# Patient Record
Sex: MALE | Race: BLACK OR AFRICAN AMERICAN | Employment: OTHER | URBAN - METROPOLITAN AREA
[De-identification: names, ages, dates, MRNs, and addresses within clinical notes are randomized per-mention and may not be internally consistent; named-entity substitution may affect disease eponyms.]

---

## 2018-03-13 ENCOUNTER — OFFICE VISIT (OUTPATIENT)
Dept: CARDIOLOGY CLINIC | Age: 38
End: 2018-03-13
Payer: MEDICARE

## 2018-03-13 VITALS
HEART RATE: 84 BPM | HEIGHT: 69 IN | WEIGHT: 254.4 LBS | RESPIRATION RATE: 20 BRPM | DIASTOLIC BLOOD PRESSURE: 78 MMHG | SYSTOLIC BLOOD PRESSURE: 118 MMHG | BODY MASS INDEX: 37.68 KG/M2

## 2018-03-13 DIAGNOSIS — I42.0 CARDIOMYOPATHY, DILATED, NONISCHEMIC (HCC): Primary | ICD-10-CM

## 2018-03-13 DIAGNOSIS — Z86.79 H/O CHF: ICD-10-CM

## 2018-03-13 DIAGNOSIS — I42.0 DILATED CARDIOMYOPATHY (HCC): Primary | ICD-10-CM

## 2018-03-13 PROCEDURE — 93000 ELECTROCARDIOGRAM COMPLETE: CPT | Performed by: NURSE PRACTITIONER

## 2018-03-13 PROCEDURE — G8417 CALC BMI ABV UP PARAM F/U: HCPCS | Performed by: NURSE PRACTITIONER

## 2018-03-13 PROCEDURE — G8484 FLU IMMUNIZE NO ADMIN: HCPCS | Performed by: NURSE PRACTITIONER

## 2018-03-13 PROCEDURE — 4004F PT TOBACCO SCREEN RCVD TLK: CPT | Performed by: NURSE PRACTITIONER

## 2018-03-13 PROCEDURE — G8427 DOCREV CUR MEDS BY ELIG CLIN: HCPCS | Performed by: NURSE PRACTITIONER

## 2018-03-13 PROCEDURE — 99213 OFFICE O/P EST LOW 20 MIN: CPT | Performed by: NURSE PRACTITIONER

## 2018-03-13 RX ORDER — CLONAZEPAM 0.5 MG/1
TABLET ORAL
COMMUNITY
Start: 2018-01-11 | End: 2018-04-16

## 2018-03-13 RX ORDER — FUROSEMIDE 40 MG/1
TABLET ORAL
Qty: 15 TABLET | Refills: 3 | Status: SHIPPED | OUTPATIENT
Start: 2018-03-13 | End: 2018-08-10 | Stop reason: SDUPTHER
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
## 2018-03-13 NOTE — PATIENT INSTRUCTIONS
especially good for you. Examples include spinach, carrots, peaches, and berries. ? · Foods high in fiber can reduce your cholesterol and provide important vitamins and minerals. High-fiber foods include whole-grain cereals and breads, oatmeal, beans, brown rice, citrus fruits, and apples. ? · Limit drinks and foods with added sugar. These include candy, desserts, and soda pop. ? Lifestyle changes  ? · If your doctor recommends it, get more exercise. Walking is a good choice. Bit by bit, increase the amount you walk every day. Try for at least 30 minutes on most days of the week. You also may want to swim, bike, or do other activities. ? · Do not smoke. If you need help quitting, talk to your doctor about stop-smoking programs and medicines. These can increase your chances of quitting for good. Quitting smoking may be the most important step you can take to protect your heart. It is never too late to quit. You will get health benefits right away. ? · Limit alcohol to 2 drinks a day for men and 1 drink a day for women. Too much alcohol can cause health problems. Medicines  ? · Take your medicines exactly as prescribed. Call your doctor if you think you are having a problem with your medicine. ? · If your doctor recommends aspirin, take the amount directed each day. Make sure you take aspirin and not another kind of pain reliever, such as acetaminophen (Tylenol). If you take ibuprofen (such as Advil or Motrin) for other problems, take aspirin at least 2 hours before taking ibuprofen. When should you call for help? Call 911 if you have symptoms of a heart attack. These may include:  ? · Chest pain or pressure, or a strange feeling in the chest.   ? · Sweating. ? · Shortness of breath. ? · Pain, pressure, or a strange feeling in the back, neck, jaw, or upper belly or in one or both shoulders or arms. ? · Lightheadedness or sudden weakness. ? · A fast or irregular heartbeat. ? After you call 911,

## 2018-03-22 ENCOUNTER — TELEPHONE (OUTPATIENT)
Dept: CARDIOLOGY CLINIC | Age: 38
End: 2018-03-22

## 2018-04-11 ENCOUNTER — HOSPITAL ENCOUNTER (OUTPATIENT)
Age: 38
Discharge: HOME | End: 2018-04-11
Payer: MEDICARE

## 2018-04-11 LAB
AMMONIA: 34 UMOL/L (ref 16–60)
BASOPHILS ABSOLUTE: 0.02 E9/L (ref 0–0.2)
BASOPHILS RELATIVE PERCENT: 0.3 % (ref 0–2)
EOSINOPHILS ABSOLUTE: 0.06 E9/L (ref 0.05–0.5)
EOSINOPHILS RELATIVE PERCENT: 0.8 % (ref 0–6)
HCT VFR BLD CALC: 37.4 % (ref 37–54)
HEMOGLOBIN: 11.9 G/DL (ref 12.5–16.5)
IMMATURE GRANULOCYTES #: 0.08 E9/L
IMMATURE GRANULOCYTES %: 1.1 % (ref 0–5)
LYMPHOCYTES ABSOLUTE: 3.09 E9/L (ref 1.5–4)
LYMPHOCYTES RELATIVE PERCENT: 41.6 % (ref 20–42)
MCH RBC QN AUTO: 26.4 PG (ref 26–35)
MCHC RBC AUTO-ENTMCNC: 31.8 % (ref 32–34.5)
MCV RBC AUTO: 82.9 FL (ref 80–99.9)
MONOCYTES ABSOLUTE: 0.67 E9/L (ref 0.1–0.95)
MONOCYTES RELATIVE PERCENT: 9 % (ref 2–12)
NEUTROPHILS ABSOLUTE: 3.5 E9/L (ref 1.8–7.3)
NEUTROPHILS RELATIVE PERCENT: 47.2 % (ref 43–80)
PDW BLD-RTO: 15.2 FL (ref 11.5–15)
PLATELET # BLD: 226 E9/L (ref 130–450)
PMV BLD AUTO: 9.8 FL (ref 7–12)
RBC # BLD: 4.51 E12/L (ref 3.8–5.8)
VALPROIC ACID LEVEL: 43 MCG/ML (ref 50–100)
WBC # BLD: 7.4 E9/L (ref 4.5–11.5)

## 2018-04-11 PROCEDURE — 36415 COLL VENOUS BLD VENIPUNCTURE: CPT

## 2018-04-11 PROCEDURE — 85025 COMPLETE CBC W/AUTO DIFF WBC: CPT

## 2018-04-11 PROCEDURE — 82140 ASSAY OF AMMONIA: CPT

## 2018-04-11 PROCEDURE — 80164 ASSAY DIPROPYLACETIC ACD TOT: CPT

## 2018-04-30 ENCOUNTER — HOSPITAL ENCOUNTER (OUTPATIENT)
Age: 38
Discharge: HOME | End: 2018-04-30
Payer: MEDICARE

## 2018-04-30 LAB
AMMONIA: 13 UMOL/L (ref 16–60)
BASOPHILS ABSOLUTE: 0.02 E9/L (ref 0–0.2)
BASOPHILS RELATIVE PERCENT: 0.3 % (ref 0–2)
EOSINOPHILS ABSOLUTE: 0.06 E9/L (ref 0.05–0.5)
EOSINOPHILS RELATIVE PERCENT: 1 % (ref 0–6)
HCT VFR BLD CALC: 35.5 % (ref 37–54)
HEMOGLOBIN: 11.2 G/DL (ref 12.5–16.5)
IMMATURE GRANULOCYTES #: 0.1 E9/L
IMMATURE GRANULOCYTES %: 1.6 % (ref 0–5)
LYMPHOCYTES ABSOLUTE: 2.28 E9/L (ref 1.5–4)
LYMPHOCYTES RELATIVE PERCENT: 36.7 % (ref 20–42)
MCH RBC QN AUTO: 26.4 PG (ref 26–35)
MCHC RBC AUTO-ENTMCNC: 31.5 % (ref 32–34.5)
MCV RBC AUTO: 83.5 FL (ref 80–99.9)
MONOCYTES ABSOLUTE: 0.61 E9/L (ref 0.1–0.95)
MONOCYTES RELATIVE PERCENT: 9.8 % (ref 2–12)
NEUTROPHILS ABSOLUTE: 3.15 E9/L (ref 1.8–7.3)
NEUTROPHILS RELATIVE PERCENT: 50.6 % (ref 43–80)
PDW BLD-RTO: 15.1 FL (ref 11.5–15)
PLATELET # BLD: 215 E9/L (ref 130–450)
PMV BLD AUTO: 9.6 FL (ref 7–12)
RBC # BLD: 4.25 E12/L (ref 3.8–5.8)
VALPROIC ACID LEVEL: 35 MCG/ML (ref 50–100)
WBC # BLD: 6.2 E9/L (ref 4.5–11.5)

## 2018-04-30 PROCEDURE — 36415 COLL VENOUS BLD VENIPUNCTURE: CPT

## 2018-04-30 PROCEDURE — 82140 ASSAY OF AMMONIA: CPT

## 2018-04-30 PROCEDURE — 85025 COMPLETE CBC W/AUTO DIFF WBC: CPT

## 2018-04-30 PROCEDURE — 80164 ASSAY DIPROPYLACETIC ACD TOT: CPT

## 2018-05-30 ENCOUNTER — HOSPITAL ENCOUNTER (OUTPATIENT)
Age: 38
Discharge: HOME | End: 2018-05-30
Payer: MEDICARE

## 2018-05-30 LAB
AMMONIA: 33 UMOL/L (ref 16–60)
BASOPHILS ABSOLUTE: 0.02 E9/L (ref 0–0.2)
BASOPHILS RELATIVE PERCENT: 0.3 % (ref 0–2)
EOSINOPHILS ABSOLUTE: 0.05 E9/L (ref 0.05–0.5)
EOSINOPHILS RELATIVE PERCENT: 0.8 % (ref 0–6)
HCT VFR BLD CALC: 35.2 % (ref 37–54)
HEMOGLOBIN: 11 G/DL (ref 12.5–16.5)
IMMATURE GRANULOCYTES #: 0.07 E9/L
IMMATURE GRANULOCYTES %: 1.1 % (ref 0–5)
LYMPHOCYTES ABSOLUTE: 2.14 E9/L (ref 1.5–4)
LYMPHOCYTES RELATIVE PERCENT: 34 % (ref 20–42)
MCH RBC QN AUTO: 26.1 PG (ref 26–35)
MCHC RBC AUTO-ENTMCNC: 31.3 % (ref 32–34.5)
MCV RBC AUTO: 83.4 FL (ref 80–99.9)
MONOCYTES ABSOLUTE: 0.62 E9/L (ref 0.1–0.95)
MONOCYTES RELATIVE PERCENT: 9.9 % (ref 2–12)
NEUTROPHILS ABSOLUTE: 3.39 E9/L (ref 1.8–7.3)
NEUTROPHILS RELATIVE PERCENT: 53.9 % (ref 43–80)
PDW BLD-RTO: 14.3 FL (ref 11.5–15)
PLATELET # BLD: 199 E9/L (ref 130–450)
PMV BLD AUTO: 9 FL (ref 7–12)
RBC # BLD: 4.22 E12/L (ref 3.8–5.8)
VALPROIC ACID LEVEL: 44 MCG/ML (ref 50–100)
WBC # BLD: 6.3 E9/L (ref 4.5–11.5)

## 2018-05-30 PROCEDURE — 85025 COMPLETE CBC W/AUTO DIFF WBC: CPT

## 2018-05-30 PROCEDURE — 80164 ASSAY DIPROPYLACETIC ACD TOT: CPT

## 2018-05-30 PROCEDURE — 82140 ASSAY OF AMMONIA: CPT

## 2018-05-30 PROCEDURE — 36415 COLL VENOUS BLD VENIPUNCTURE: CPT

## 2018-06-21 ENCOUNTER — HOSPITAL ENCOUNTER (OUTPATIENT)
Age: 38
Discharge: HOME | End: 2018-06-21
Payer: MEDICARE

## 2018-06-21 LAB
AMMONIA: 26 UMOL/L (ref 16–60)
BASOPHILS ABSOLUTE: 0.02 E9/L (ref 0–0.2)
BASOPHILS RELATIVE PERCENT: 0.3 % (ref 0–2)
EOSINOPHILS ABSOLUTE: 0.06 E9/L (ref 0.05–0.5)
EOSINOPHILS RELATIVE PERCENT: 1 % (ref 0–6)
HCT VFR BLD CALC: 32.8 % (ref 37–54)
HEMOGLOBIN: 10.6 G/DL (ref 12.5–16.5)
IMMATURE GRANULOCYTES #: 0.06 E9/L
IMMATURE GRANULOCYTES %: 1 % (ref 0–5)
LYMPHOCYTES ABSOLUTE: 1.87 E9/L (ref 1.5–4)
LYMPHOCYTES RELATIVE PERCENT: 32.2 % (ref 20–42)
MCH RBC QN AUTO: 26.6 PG (ref 26–35)
MCHC RBC AUTO-ENTMCNC: 32.3 % (ref 32–34.5)
MCV RBC AUTO: 82.2 FL (ref 80–99.9)
MONOCYTES ABSOLUTE: 0.59 E9/L (ref 0.1–0.95)
MONOCYTES RELATIVE PERCENT: 10.2 % (ref 2–12)
NEUTROPHILS ABSOLUTE: 3.21 E9/L (ref 1.8–7.3)
NEUTROPHILS RELATIVE PERCENT: 55.3 % (ref 43–80)
PDW BLD-RTO: 14.2 FL (ref 11.5–15)
PLATELET # BLD: 186 E9/L (ref 130–450)
PMV BLD AUTO: 9 FL (ref 7–12)
RBC # BLD: 3.99 E12/L (ref 3.8–5.8)
VALPROIC ACID LEVEL: 24 MCG/ML (ref 50–100)
WBC # BLD: 5.8 E9/L (ref 4.5–11.5)

## 2018-06-21 PROCEDURE — 82140 ASSAY OF AMMONIA: CPT

## 2018-06-21 PROCEDURE — 36415 COLL VENOUS BLD VENIPUNCTURE: CPT

## 2018-06-21 PROCEDURE — 80164 ASSAY DIPROPYLACETIC ACD TOT: CPT

## 2018-06-21 PROCEDURE — 85025 COMPLETE CBC W/AUTO DIFF WBC: CPT

## 2018-07-02 ENCOUNTER — HOSPITAL ENCOUNTER (OUTPATIENT)
Age: 38
Discharge: HOME | End: 2018-07-02
Payer: MEDICARE

## 2018-07-02 LAB
AMMONIA: 31 UMOL/L (ref 16–60)
BASOPHILS ABSOLUTE: 0.02 E9/L (ref 0–0.2)
BASOPHILS RELATIVE PERCENT: 0.3 % (ref 0–2)
EOSINOPHILS ABSOLUTE: 0.05 E9/L (ref 0.05–0.5)
EOSINOPHILS RELATIVE PERCENT: 0.8 % (ref 0–6)
HCT VFR BLD CALC: 34.1 % (ref 37–54)
HEMOGLOBIN: 11.1 G/DL (ref 12.5–16.5)
IMMATURE GRANULOCYTES #: 0.06 E9/L
IMMATURE GRANULOCYTES %: 1 % (ref 0–5)
LYMPHOCYTES ABSOLUTE: 2.21 E9/L (ref 1.5–4)
LYMPHOCYTES RELATIVE PERCENT: 36 % (ref 20–42)
MCH RBC QN AUTO: 26.6 PG (ref 26–35)
MCHC RBC AUTO-ENTMCNC: 32.6 % (ref 32–34.5)
MCV RBC AUTO: 81.8 FL (ref 80–99.9)
MONOCYTES ABSOLUTE: 0.68 E9/L (ref 0.1–0.95)
MONOCYTES RELATIVE PERCENT: 11.1 % (ref 2–12)
NEUTROPHILS ABSOLUTE: 3.12 E9/L (ref 1.8–7.3)
NEUTROPHILS RELATIVE PERCENT: 50.8 % (ref 43–80)
PDW BLD-RTO: 14.6 FL (ref 11.5–15)
PLATELET # BLD: 224 E9/L (ref 130–450)
PMV BLD AUTO: 9.2 FL (ref 7–12)
RBC # BLD: 4.17 E12/L (ref 3.8–5.8)
VALPROIC ACID LEVEL: 50 MCG/ML (ref 50–100)
WBC # BLD: 6.1 E9/L (ref 4.5–11.5)

## 2018-07-02 PROCEDURE — 80164 ASSAY DIPROPYLACETIC ACD TOT: CPT

## 2018-07-02 PROCEDURE — 36415 COLL VENOUS BLD VENIPUNCTURE: CPT

## 2018-07-02 PROCEDURE — 82140 ASSAY OF AMMONIA: CPT

## 2018-07-02 PROCEDURE — 85025 COMPLETE CBC W/AUTO DIFF WBC: CPT

## 2018-07-13 ENCOUNTER — HOSPITAL ENCOUNTER (OUTPATIENT)
Age: 38
Discharge: HOME | End: 2018-07-13
Payer: MEDICARE

## 2018-07-13 LAB
BASOPHILS ABSOLUTE: 0.02 E9/L (ref 0–0.2)
BASOPHILS RELATIVE PERCENT: 0.3 % (ref 0–2)
EOSINOPHILS ABSOLUTE: 0.07 E9/L (ref 0.05–0.5)
EOSINOPHILS RELATIVE PERCENT: 1 % (ref 0–6)
HCT VFR BLD CALC: 34.4 % (ref 37–54)
HEMOGLOBIN: 11 G/DL (ref 12.5–16.5)
IMMATURE GRANULOCYTES #: 0.05 E9/L
IMMATURE GRANULOCYTES %: 0.7 % (ref 0–5)
LYMPHOCYTES ABSOLUTE: 2.95 E9/L (ref 1.5–4)
LYMPHOCYTES RELATIVE PERCENT: 41.4 % (ref 20–42)
MCH RBC QN AUTO: 26.5 PG (ref 26–35)
MCHC RBC AUTO-ENTMCNC: 32 % (ref 32–34.5)
MCV RBC AUTO: 82.9 FL (ref 80–99.9)
MONOCYTES ABSOLUTE: 0.72 E9/L (ref 0.1–0.95)
MONOCYTES RELATIVE PERCENT: 10.1 % (ref 2–12)
NEUTROPHILS ABSOLUTE: 3.32 E9/L (ref 1.8–7.3)
NEUTROPHILS RELATIVE PERCENT: 46.5 % (ref 43–80)
PDW BLD-RTO: 14.5 FL (ref 11.5–15)
PLATELET # BLD: 253 E9/L (ref 130–450)
PMV BLD AUTO: 9.4 FL (ref 7–12)
RBC # BLD: 4.15 E12/L (ref 3.8–5.8)
WBC # BLD: 7.1 E9/L (ref 4.5–11.5)

## 2018-07-13 PROCEDURE — 36415 COLL VENOUS BLD VENIPUNCTURE: CPT

## 2018-07-13 PROCEDURE — 85025 COMPLETE CBC W/AUTO DIFF WBC: CPT

## 2018-07-20 ENCOUNTER — HOSPITAL ENCOUNTER (OUTPATIENT)
Age: 38
Discharge: HOME | End: 2018-07-20
Payer: MEDICARE

## 2018-07-20 LAB
AMMONIA: 45 UMOL/L (ref 16–60)
BASOPHILS ABSOLUTE: 0.02 E9/L (ref 0–0.2)
BASOPHILS RELATIVE PERCENT: 0.3 % (ref 0–2)
EOSINOPHILS ABSOLUTE: 0.06 E9/L (ref 0.05–0.5)
EOSINOPHILS RELATIVE PERCENT: 1 % (ref 0–6)
HCT VFR BLD CALC: 35.8 % (ref 37–54)
HEMOGLOBIN: 11.2 G/DL (ref 12.5–16.5)
IMMATURE GRANULOCYTES #: 0.04 E9/L
IMMATURE GRANULOCYTES %: 0.7 % (ref 0–5)
LYMPHOCYTES ABSOLUTE: 2.26 E9/L (ref 1.5–4)
LYMPHOCYTES RELATIVE PERCENT: 38 % (ref 20–42)
MCH RBC QN AUTO: 25.8 PG (ref 26–35)
MCHC RBC AUTO-ENTMCNC: 31.3 % (ref 32–34.5)
MCV RBC AUTO: 82.5 FL (ref 80–99.9)
MONOCYTES ABSOLUTE: 0.47 E9/L (ref 0.1–0.95)
MONOCYTES RELATIVE PERCENT: 7.9 % (ref 2–12)
NEUTROPHILS ABSOLUTE: 3.09 E9/L (ref 1.8–7.3)
NEUTROPHILS RELATIVE PERCENT: 52.1 % (ref 43–80)
PDW BLD-RTO: 14.1 FL (ref 11.5–15)
PLATELET # BLD: 222 E9/L (ref 130–450)
PMV BLD AUTO: 10 FL (ref 7–12)
RBC # BLD: 4.34 E12/L (ref 3.8–5.8)
VALPROIC ACID LEVEL: 52 MCG/ML (ref 50–100)
WBC # BLD: 5.9 E9/L (ref 4.5–11.5)

## 2018-07-20 PROCEDURE — 80164 ASSAY DIPROPYLACETIC ACD TOT: CPT

## 2018-07-20 PROCEDURE — 85025 COMPLETE CBC W/AUTO DIFF WBC: CPT

## 2018-07-20 PROCEDURE — 36415 COLL VENOUS BLD VENIPUNCTURE: CPT

## 2018-07-20 PROCEDURE — 82140 ASSAY OF AMMONIA: CPT

## 2018-07-26 ENCOUNTER — HOSPITAL ENCOUNTER (OUTPATIENT)
Age: 38
Discharge: HOME | End: 2018-07-26
Payer: MEDICARE

## 2018-07-26 LAB
AMMONIA: 39 UMOL/L (ref 16–60)
BASOPHILS ABSOLUTE: 0.02 E9/L (ref 0–0.2)
BASOPHILS RELATIVE PERCENT: 0.3 % (ref 0–2)
EOSINOPHILS ABSOLUTE: 0.08 E9/L (ref 0.05–0.5)
EOSINOPHILS RELATIVE PERCENT: 1.2 % (ref 0–6)
HCT VFR BLD CALC: 35.9 % (ref 37–54)
HEMOGLOBIN: 11.3 G/DL (ref 12.5–16.5)
IMMATURE GRANULOCYTES #: 0.04 E9/L
IMMATURE GRANULOCYTES %: 0.6 % (ref 0–5)
LYMPHOCYTES ABSOLUTE: 2.46 E9/L (ref 1.5–4)
LYMPHOCYTES RELATIVE PERCENT: 37.8 % (ref 20–42)
MCH RBC QN AUTO: 25.8 PG (ref 26–35)
MCHC RBC AUTO-ENTMCNC: 31.5 % (ref 32–34.5)
MCV RBC AUTO: 82 FL (ref 80–99.9)
MONOCYTES ABSOLUTE: 0.64 E9/L (ref 0.1–0.95)
MONOCYTES RELATIVE PERCENT: 9.8 % (ref 2–12)
NEUTROPHILS ABSOLUTE: 3.27 E9/L (ref 1.8–7.3)
NEUTROPHILS RELATIVE PERCENT: 50.3 % (ref 43–80)
PDW BLD-RTO: 14.1 FL (ref 11.5–15)
PLATELET # BLD: 216 E9/L (ref 130–450)
PMV BLD AUTO: 9.6 FL (ref 7–12)
RBC # BLD: 4.38 E12/L (ref 3.8–5.8)
VALPROIC ACID LEVEL: 17 MCG/ML (ref 50–100)
WBC # BLD: 6.5 E9/L (ref 4.5–11.5)

## 2018-07-26 PROCEDURE — 80164 ASSAY DIPROPYLACETIC ACD TOT: CPT

## 2018-07-26 PROCEDURE — 36415 COLL VENOUS BLD VENIPUNCTURE: CPT

## 2018-07-26 PROCEDURE — 82140 ASSAY OF AMMONIA: CPT

## 2018-07-26 PROCEDURE — 85025 COMPLETE CBC W/AUTO DIFF WBC: CPT

## 2018-08-03 ENCOUNTER — HOSPITAL ENCOUNTER (OUTPATIENT)
Age: 38
Discharge: HOME | End: 2018-08-03
Payer: MEDICARE

## 2018-08-03 LAB
AMMONIA: 29 UMOL/L (ref 16–60)
BASOPHILS ABSOLUTE: 0.02 E9/L (ref 0–0.2)
BASOPHILS RELATIVE PERCENT: 0.3 % (ref 0–2)
EOSINOPHILS ABSOLUTE: 0.07 E9/L (ref 0.05–0.5)
EOSINOPHILS RELATIVE PERCENT: 1.1 % (ref 0–6)
HCT VFR BLD CALC: 37.7 % (ref 37–54)
HEMOGLOBIN: 11.9 G/DL (ref 12.5–16.5)
IMMATURE GRANULOCYTES #: 0.06 E9/L
IMMATURE GRANULOCYTES %: 0.9 % (ref 0–5)
LYMPHOCYTES ABSOLUTE: 2.94 E9/L (ref 1.5–4)
LYMPHOCYTES RELATIVE PERCENT: 46.2 % (ref 20–42)
MCH RBC QN AUTO: 25.7 PG (ref 26–35)
MCHC RBC AUTO-ENTMCNC: 31.6 % (ref 32–34.5)
MCV RBC AUTO: 81.4 FL (ref 80–99.9)
MONOCYTES ABSOLUTE: 0.52 E9/L (ref 0.1–0.95)
MONOCYTES RELATIVE PERCENT: 8.2 % (ref 2–12)
NEUTROPHILS ABSOLUTE: 2.75 E9/L (ref 1.8–7.3)
NEUTROPHILS RELATIVE PERCENT: 43.3 % (ref 43–80)
PDW BLD-RTO: 14.3 FL (ref 11.5–15)
PLATELET # BLD: 250 E9/L (ref 130–450)
PMV BLD AUTO: 9.3 FL (ref 7–12)
RBC # BLD: 4.63 E12/L (ref 3.8–5.8)
VALPROIC ACID LEVEL: 45 MCG/ML (ref 50–100)
WBC # BLD: 6.4 E9/L (ref 4.5–11.5)

## 2018-08-03 PROCEDURE — 82140 ASSAY OF AMMONIA: CPT

## 2018-08-03 PROCEDURE — 85025 COMPLETE CBC W/AUTO DIFF WBC: CPT

## 2018-08-03 PROCEDURE — 36415 COLL VENOUS BLD VENIPUNCTURE: CPT

## 2018-08-03 PROCEDURE — 80164 ASSAY DIPROPYLACETIC ACD TOT: CPT

## 2018-08-10 ENCOUNTER — HOSPITAL ENCOUNTER (OUTPATIENT)
Age: 38
Discharge: HOME | End: 2018-08-10
Payer: MEDICARE

## 2018-08-10 LAB
AMMONIA: 42 UMOL/L (ref 16–60)
BASOPHILS ABSOLUTE: 0.02 E9/L (ref 0–0.2)
BASOPHILS RELATIVE PERCENT: 0.3 % (ref 0–2)
EOSINOPHILS ABSOLUTE: 0.05 E9/L (ref 0.05–0.5)
EOSINOPHILS RELATIVE PERCENT: 0.8 % (ref 0–6)
HCT VFR BLD CALC: 35.1 % (ref 37–54)
HEMOGLOBIN: 11.3 G/DL (ref 12.5–16.5)
IMMATURE GRANULOCYTES #: 0.08 E9/L
IMMATURE GRANULOCYTES %: 1.3 % (ref 0–5)
LYMPHOCYTES ABSOLUTE: 2.52 E9/L (ref 1.5–4)
LYMPHOCYTES RELATIVE PERCENT: 39.4 % (ref 20–42)
MCH RBC QN AUTO: 26.5 PG (ref 26–35)
MCHC RBC AUTO-ENTMCNC: 32.2 % (ref 32–34.5)
MCV RBC AUTO: 82.4 FL (ref 80–99.9)
MONOCYTES ABSOLUTE: 0.54 E9/L (ref 0.1–0.95)
MONOCYTES RELATIVE PERCENT: 8.4 % (ref 2–12)
NEUTROPHILS ABSOLUTE: 3.19 E9/L (ref 1.8–7.3)
NEUTROPHILS RELATIVE PERCENT: 49.8 % (ref 43–80)
PDW BLD-RTO: 14.5 FL (ref 11.5–15)
PLATELET # BLD: 221 E9/L (ref 130–450)
PMV BLD AUTO: 9.2 FL (ref 7–12)
RBC # BLD: 4.26 E12/L (ref 3.8–5.8)
VALPROIC ACID LEVEL: 35 MCG/ML (ref 50–100)
WBC # BLD: 6.4 E9/L (ref 4.5–11.5)

## 2018-08-10 PROCEDURE — 82140 ASSAY OF AMMONIA: CPT

## 2018-08-10 PROCEDURE — 36415 COLL VENOUS BLD VENIPUNCTURE: CPT

## 2018-08-10 PROCEDURE — 80164 ASSAY DIPROPYLACETIC ACD TOT: CPT

## 2018-08-10 PROCEDURE — 85025 COMPLETE CBC W/AUTO DIFF WBC: CPT

## 2018-08-17 ENCOUNTER — HOSPITAL ENCOUNTER (OUTPATIENT)
Age: 38
Discharge: HOME | End: 2018-08-17
Payer: MEDICARE

## 2018-08-17 LAB
BASOPHILS ABSOLUTE: 0.03 E9/L (ref 0–0.2)
BASOPHILS RELATIVE PERCENT: 0.4 % (ref 0–2)
EOSINOPHILS ABSOLUTE: 0.07 E9/L (ref 0.05–0.5)
EOSINOPHILS RELATIVE PERCENT: 0.9 % (ref 0–6)
HCT VFR BLD CALC: 36 % (ref 37–54)
HEMOGLOBIN: 11.7 G/DL (ref 12.5–16.5)
IMMATURE GRANULOCYTES #: 0.04 E9/L
IMMATURE GRANULOCYTES %: 0.5 % (ref 0–5)
LYMPHOCYTES ABSOLUTE: 3.05 E9/L (ref 1.5–4)
LYMPHOCYTES RELATIVE PERCENT: 37.4 % (ref 20–42)
MCH RBC QN AUTO: 26.5 PG (ref 26–35)
MCHC RBC AUTO-ENTMCNC: 32.5 % (ref 32–34.5)
MCV RBC AUTO: 81.6 FL (ref 80–99.9)
MONOCYTES ABSOLUTE: 0.86 E9/L (ref 0.1–0.95)
MONOCYTES RELATIVE PERCENT: 10.5 % (ref 2–12)
NEUTROPHILS ABSOLUTE: 4.11 E9/L (ref 1.8–7.3)
NEUTROPHILS RELATIVE PERCENT: 50.3 % (ref 43–80)
PDW BLD-RTO: 14.4 FL (ref 11.5–15)
PLATELET # BLD: 238 E9/L (ref 130–450)
PMV BLD AUTO: 9.3 FL (ref 7–12)
RBC # BLD: 4.41 E12/L (ref 3.8–5.8)
WBC # BLD: 8.2 E9/L (ref 4.5–11.5)

## 2018-08-17 PROCEDURE — 36415 COLL VENOUS BLD VENIPUNCTURE: CPT

## 2018-08-17 PROCEDURE — 85025 COMPLETE CBC W/AUTO DIFF WBC: CPT

## 2018-08-27 ENCOUNTER — HOSPITAL ENCOUNTER (OUTPATIENT)
Age: 38
Discharge: HOME | End: 2018-08-27
Payer: MEDICARE

## 2018-08-27 LAB
BASOPHILS ABSOLUTE: 0.03 E9/L (ref 0–0.2)
BASOPHILS RELATIVE PERCENT: 0.4 % (ref 0–2)
EOSINOPHILS ABSOLUTE: 0.06 E9/L (ref 0.05–0.5)
EOSINOPHILS RELATIVE PERCENT: 0.9 % (ref 0–6)
HCT VFR BLD CALC: 36.6 % (ref 37–54)
HEMOGLOBIN: 11.6 G/DL (ref 12.5–16.5)
IMMATURE GRANULOCYTES #: 0.07 E9/L
IMMATURE GRANULOCYTES %: 1 % (ref 0–5)
LYMPHOCYTES ABSOLUTE: 2.69 E9/L (ref 1.5–4)
LYMPHOCYTES RELATIVE PERCENT: 38.6 % (ref 20–42)
MCH RBC QN AUTO: 26.3 PG (ref 26–35)
MCHC RBC AUTO-ENTMCNC: 31.7 % (ref 32–34.5)
MCV RBC AUTO: 83 FL (ref 80–99.9)
MONOCYTES ABSOLUTE: 0.65 E9/L (ref 0.1–0.95)
MONOCYTES RELATIVE PERCENT: 9.3 % (ref 2–12)
NEUTROPHILS ABSOLUTE: 3.46 E9/L (ref 1.8–7.3)
NEUTROPHILS RELATIVE PERCENT: 49.8 % (ref 43–80)
PDW BLD-RTO: 14.2 FL (ref 11.5–15)
PLATELET # BLD: 233 E9/L (ref 130–450)
PMV BLD AUTO: 9.2 FL (ref 7–12)
RBC # BLD: 4.41 E12/L (ref 3.8–5.8)
WBC # BLD: 7 E9/L (ref 4.5–11.5)

## 2018-08-27 PROCEDURE — 85025 COMPLETE CBC W/AUTO DIFF WBC: CPT

## 2018-08-27 PROCEDURE — 36415 COLL VENOUS BLD VENIPUNCTURE: CPT

## 2018-09-04 ENCOUNTER — HOSPITAL ENCOUNTER (OUTPATIENT)
Age: 38
Discharge: HOME | End: 2018-09-04
Payer: MEDICARE

## 2018-09-04 LAB
BASOPHILS ABSOLUTE: 0.02 E9/L (ref 0–0.2)
BASOPHILS RELATIVE PERCENT: 0.3 % (ref 0–2)
EOSINOPHILS ABSOLUTE: 0.06 E9/L (ref 0.05–0.5)
EOSINOPHILS RELATIVE PERCENT: 0.8 % (ref 0–6)
HCT VFR BLD CALC: 35.2 % (ref 37–54)
HEMOGLOBIN: 11.2 G/DL (ref 12.5–16.5)
IMMATURE GRANULOCYTES #: 0.05 E9/L
IMMATURE GRANULOCYTES %: 0.6 % (ref 0–5)
LYMPHOCYTES ABSOLUTE: 3.01 E9/L (ref 1.5–4)
LYMPHOCYTES RELATIVE PERCENT: 39.1 % (ref 20–42)
MCH RBC QN AUTO: 25.9 PG (ref 26–35)
MCHC RBC AUTO-ENTMCNC: 31.8 % (ref 32–34.5)
MCV RBC AUTO: 81.5 FL (ref 80–99.9)
MONOCYTES ABSOLUTE: 0.75 E9/L (ref 0.1–0.95)
MONOCYTES RELATIVE PERCENT: 9.7 % (ref 2–12)
NEUTROPHILS ABSOLUTE: 3.81 E9/L (ref 1.8–7.3)
NEUTROPHILS RELATIVE PERCENT: 49.5 % (ref 43–80)
PDW BLD-RTO: 14.2 FL (ref 11.5–15)
PLATELET # BLD: 203 E9/L (ref 130–450)
PMV BLD AUTO: 9.4 FL (ref 7–12)
RBC # BLD: 4.32 E12/L (ref 3.8–5.8)
WBC # BLD: 7.7 E9/L (ref 4.5–11.5)

## 2018-09-04 PROCEDURE — 36415 COLL VENOUS BLD VENIPUNCTURE: CPT

## 2018-09-04 PROCEDURE — 85025 COMPLETE CBC W/AUTO DIFF WBC: CPT

## 2018-09-10 ENCOUNTER — HOSPITAL ENCOUNTER (OUTPATIENT)
Age: 38
Discharge: HOME | End: 2018-09-10
Payer: MEDICARE

## 2018-09-10 LAB
AMMONIA: 14 UMOL/L (ref 16–60)
BASOPHILS ABSOLUTE: 0.03 E9/L (ref 0–0.2)
BASOPHILS RELATIVE PERCENT: 0.4 % (ref 0–2)
EOSINOPHILS ABSOLUTE: 0.06 E9/L (ref 0.05–0.5)
EOSINOPHILS RELATIVE PERCENT: 0.8 % (ref 0–6)
HCT VFR BLD CALC: 36.2 % (ref 37–54)
HEMOGLOBIN: 11.6 G/DL (ref 12.5–16.5)
IMMATURE GRANULOCYTES #: 0.04 E9/L
IMMATURE GRANULOCYTES %: 0.5 % (ref 0–5)
LYMPHOCYTES ABSOLUTE: 2.92 E9/L (ref 1.5–4)
LYMPHOCYTES RELATIVE PERCENT: 39.6 % (ref 20–42)
MCH RBC QN AUTO: 26.1 PG (ref 26–35)
MCHC RBC AUTO-ENTMCNC: 32 % (ref 32–34.5)
MCV RBC AUTO: 81.3 FL (ref 80–99.9)
MONOCYTES ABSOLUTE: 0.69 E9/L (ref 0.1–0.95)
MONOCYTES RELATIVE PERCENT: 9.3 % (ref 2–12)
NEUTROPHILS ABSOLUTE: 3.64 E9/L (ref 1.8–7.3)
NEUTROPHILS RELATIVE PERCENT: 49.4 % (ref 43–80)
PDW BLD-RTO: 14.3 FL (ref 11.5–15)
PLATELET # BLD: 221 E9/L (ref 130–450)
PMV BLD AUTO: 9.4 FL (ref 7–12)
RBC # BLD: 4.45 E12/L (ref 3.8–5.8)
VALPROIC ACID LEVEL: 53 MCG/ML (ref 50–100)
WBC # BLD: 7.4 E9/L (ref 4.5–11.5)

## 2018-09-10 PROCEDURE — 82140 ASSAY OF AMMONIA: CPT

## 2018-09-10 PROCEDURE — 36415 COLL VENOUS BLD VENIPUNCTURE: CPT

## 2018-09-10 PROCEDURE — 85025 COMPLETE CBC W/AUTO DIFF WBC: CPT

## 2018-09-10 PROCEDURE — 80164 ASSAY DIPROPYLACETIC ACD TOT: CPT

## 2018-09-20 ENCOUNTER — HOSPITAL ENCOUNTER (OUTPATIENT)
Age: 38
Discharge: HOME | End: 2018-09-20
Payer: MEDICARE

## 2018-09-20 LAB
BASOPHILS ABSOLUTE: 0.03 E9/L (ref 0–0.2)
BASOPHILS RELATIVE PERCENT: 0.5 % (ref 0–2)
EOSINOPHILS ABSOLUTE: 0.04 E9/L (ref 0.05–0.5)
EOSINOPHILS RELATIVE PERCENT: 0.6 % (ref 0–6)
HCT VFR BLD CALC: 36.5 % (ref 37–54)
HEMOGLOBIN: 11.6 G/DL (ref 12.5–16.5)
IMMATURE GRANULOCYTES #: 0.03 E9/L
IMMATURE GRANULOCYTES %: 0.5 % (ref 0–5)
LYMPHOCYTES ABSOLUTE: 2.22 E9/L (ref 1.5–4)
LYMPHOCYTES RELATIVE PERCENT: 35.1 % (ref 20–42)
MCH RBC QN AUTO: 25.9 PG (ref 26–35)
MCHC RBC AUTO-ENTMCNC: 31.8 % (ref 32–34.5)
MCV RBC AUTO: 81.5 FL (ref 80–99.9)
MONOCYTES ABSOLUTE: 0.56 E9/L (ref 0.1–0.95)
MONOCYTES RELATIVE PERCENT: 8.9 % (ref 2–12)
NEUTROPHILS ABSOLUTE: 3.44 E9/L (ref 1.8–7.3)
NEUTROPHILS RELATIVE PERCENT: 54.4 % (ref 43–80)
PDW BLD-RTO: 14.4 FL (ref 11.5–15)
PLATELET # BLD: 224 E9/L (ref 130–450)
PMV BLD AUTO: 9.4 FL (ref 7–12)
RBC # BLD: 4.48 E12/L (ref 3.8–5.8)
WBC # BLD: 6.3 E9/L (ref 4.5–11.5)

## 2018-09-20 PROCEDURE — 36415 COLL VENOUS BLD VENIPUNCTURE: CPT

## 2018-09-20 PROCEDURE — 85025 COMPLETE CBC W/AUTO DIFF WBC: CPT

## 2018-09-24 ENCOUNTER — HOSPITAL ENCOUNTER (INPATIENT)
Age: 38
LOS: 6 days | Discharge: HOME | DRG: 885 | End: 2018-10-01
Attending: EMERGENCY MEDICINE | Admitting: PSYCHIATRY & NEUROLOGY
Payer: MEDICARE

## 2018-09-24 DIAGNOSIS — Z00.8 PATIENT NEEDS PSYCHIATRIC HOLD FOR EVALUATION: Primary | ICD-10-CM

## 2018-09-24 LAB
ACETAMINOPHEN LEVEL: <5 MCG/ML (ref 10–30)
ALBUMIN SERPL-MCNC: 4.2 G/DL (ref 3.5–5.2)
ALP BLD-CCNC: 75 U/L (ref 40–129)
ALT SERPL-CCNC: 10 U/L (ref 0–40)
AMPHETAMINE SCREEN, URINE: NOT DETECTED
ANION GAP SERPL CALCULATED.3IONS-SCNC: 14 MMOL/L (ref 7–16)
AST SERPL-CCNC: 12 U/L (ref 0–39)
BARBITURATE SCREEN URINE: NOT DETECTED
BENZODIAZEPINE SCREEN, URINE: NOT DETECTED
BILIRUB SERPL-MCNC: <0.2 MG/DL (ref 0–1.2)
BUN BLDV-MCNC: 11 MG/DL (ref 6–20)
CALCIUM SERPL-MCNC: 9.4 MG/DL (ref 8.6–10.2)
CANNABINOID SCREEN URINE: NOT DETECTED
CHLORIDE BLD-SCNC: 97 MMOL/L (ref 98–107)
CO2: 25 MMOL/L (ref 22–29)
COCAINE METABOLITE SCREEN URINE: NOT DETECTED
CREAT SERPL-MCNC: 1.2 MG/DL (ref 0.7–1.2)
ETHANOL: <10 MG/DL (ref 0–0.08)
GFR AFRICAN AMERICAN: >60
GFR NON-AFRICAN AMERICAN: >60 ML/MIN/1.73
GLUCOSE BLD-MCNC: 111 MG/DL (ref 74–109)
HCT VFR BLD CALC: 36.8 % (ref 37–54)
HEMOGLOBIN: 11.8 G/DL (ref 12.5–16.5)
MCH RBC QN AUTO: 25.9 PG (ref 26–35)
MCHC RBC AUTO-ENTMCNC: 32.1 % (ref 32–34.5)
MCV RBC AUTO: 80.9 FL (ref 80–99.9)
METHADONE SCREEN, URINE: NOT DETECTED
OPIATE SCREEN URINE: NOT DETECTED
PDW BLD-RTO: 14.6 FL (ref 11.5–15)
PHENCYCLIDINE SCREEN URINE: NOT DETECTED
PLATELET # BLD: 226 E9/L (ref 130–450)
PMV BLD AUTO: 9.5 FL (ref 7–12)
POTASSIUM SERPL-SCNC: 3.9 MMOL/L (ref 3.5–5)
PROPOXYPHENE SCREEN: NOT DETECTED
RBC # BLD: 4.55 E12/L (ref 3.8–5.8)
SALICYLATE, SERUM: <0.3 MG/DL (ref 0–30)
SODIUM BLD-SCNC: 136 MMOL/L (ref 132–146)
TOTAL PROTEIN: 7.2 G/DL (ref 6.4–8.3)
TRICYCLIC ANTIDEPRESSANTS SCREEN SERUM: NEGATIVE NG/ML
VALPROIC ACID LEVEL: 35 MCG/ML (ref 50–100)
WBC # BLD: 7.1 E9/L (ref 4.5–11.5)

## 2018-09-24 PROCEDURE — G0480 DRUG TEST DEF 1-7 CLASSES: HCPCS

## 2018-09-24 PROCEDURE — 85027 COMPLETE CBC AUTOMATED: CPT

## 2018-09-24 PROCEDURE — 99285 EMERGENCY DEPT VISIT HI MDM: CPT

## 2018-09-24 PROCEDURE — 80164 ASSAY DIPROPYLACETIC ACD TOT: CPT

## 2018-09-24 PROCEDURE — 80053 COMPREHEN METABOLIC PANEL: CPT

## 2018-09-24 PROCEDURE — 80307 DRUG TEST PRSMV CHEM ANLYZR: CPT

## 2018-09-25 PROCEDURE — 1240000000 HC EMOTIONAL WELLNESS R&B

## 2018-09-25 PROCEDURE — 6370000000 HC RX 637 (ALT 250 FOR IP): Performed by: EMERGENCY MEDICINE

## 2018-09-25 PROCEDURE — 6370000000 HC RX 637 (ALT 250 FOR IP): Performed by: NURSE PRACTITIONER

## 2018-09-25 RX ORDER — ENALAPRIL MALEATE 5 MG/1
10 TABLET ORAL DAILY
Status: DISCONTINUED | OUTPATIENT
Start: 2018-09-25 | End: 2018-10-01 | Stop reason: HOSPADM

## 2018-09-25 RX ORDER — HALOPERIDOL 5 MG/ML
10 INJECTION INTRAMUSCULAR EVERY 6 HOURS PRN
Status: DISCONTINUED | OUTPATIENT
Start: 2018-09-25 | End: 2018-10-01 | Stop reason: HOSPADM

## 2018-09-25 RX ORDER — SPIRONOLACTONE 25 MG/1
50 TABLET ORAL DAILY
Status: DISCONTINUED | OUTPATIENT
Start: 2018-09-25 | End: 2018-10-01 | Stop reason: HOSPADM

## 2018-09-25 RX ORDER — HYDROXYZINE PAMOATE 50 MG/1
50 CAPSULE ORAL EVERY 6 HOURS PRN
Status: DISCONTINUED | OUTPATIENT
Start: 2018-09-25 | End: 2018-10-01 | Stop reason: HOSPADM

## 2018-09-25 RX ORDER — ACETAMINOPHEN 325 MG/1
650 TABLET ORAL EVERY 4 HOURS PRN
Status: DISCONTINUED | OUTPATIENT
Start: 2018-09-25 | End: 2018-10-01 | Stop reason: HOSPADM

## 2018-09-25 RX ORDER — BENZTROPINE MESYLATE 1 MG/ML
2 INJECTION INTRAMUSCULAR; INTRAVENOUS 2 TIMES DAILY PRN
Status: DISCONTINUED | OUTPATIENT
Start: 2018-09-25 | End: 2018-10-01 | Stop reason: HOSPADM

## 2018-09-25 RX ORDER — CARVEDILOL 25 MG/1
25 TABLET ORAL 2 TIMES DAILY WITH MEALS
Status: DISCONTINUED | OUTPATIENT
Start: 2018-09-25 | End: 2018-10-01 | Stop reason: HOSPADM

## 2018-09-25 RX ORDER — DIVALPROEX SODIUM 500 MG/1
500 TABLET, DELAYED RELEASE ORAL NIGHTLY
Status: DISCONTINUED | OUTPATIENT
Start: 2018-09-25 | End: 2018-10-01 | Stop reason: HOSPADM

## 2018-09-25 RX ORDER — MAGNESIUM HYDROXIDE/ALUMINUM HYDROXICE/SIMETHICONE 120; 1200; 1200 MG/30ML; MG/30ML; MG/30ML
30 SUSPENSION ORAL PRN
Status: DISCONTINUED | OUTPATIENT
Start: 2018-09-25 | End: 2018-10-01 | Stop reason: HOSPADM

## 2018-09-25 RX ORDER — CLOZAPINE 100 MG/1
300 TABLET ORAL 2 TIMES DAILY
Status: DISCONTINUED | OUTPATIENT
Start: 2018-09-25 | End: 2018-09-26

## 2018-09-25 RX ORDER — NICOTINE 21 MG/24HR
1 PATCH, TRANSDERMAL 24 HOURS TRANSDERMAL DAILY
Status: DISCONTINUED | OUTPATIENT
Start: 2018-09-26 | End: 2018-10-01 | Stop reason: HOSPADM

## 2018-09-25 RX ORDER — PALIPERIDONE 9 MG/1
9 TABLET, EXTENDED RELEASE ORAL EVERY MORNING
Status: DISCONTINUED | OUTPATIENT
Start: 2018-09-25 | End: 2018-09-26

## 2018-09-25 RX ORDER — TRAZODONE HYDROCHLORIDE 50 MG/1
50 TABLET ORAL NIGHTLY PRN
Status: DISCONTINUED | OUTPATIENT
Start: 2018-09-25 | End: 2018-10-01 | Stop reason: HOSPADM

## 2018-09-25 RX ORDER — OLANZAPINE 10 MG/1
10 TABLET ORAL
Status: ACTIVE | OUTPATIENT
Start: 2018-09-25 | End: 2018-09-25

## 2018-09-25 RX ADMIN — DIVALPROEX SODIUM 500 MG: 500 TABLET, DELAYED RELEASE ORAL at 21:37

## 2018-09-25 RX ADMIN — ALUMINUM HYDROXIDE, MAGNESIUM HYDROXIDE, AND SIMETHICONE 30 ML: 200; 200; 20 SUSPENSION ORAL at 17:46

## 2018-09-25 RX ADMIN — CARVEDILOL 25 MG: 25 TABLET, FILM COATED ORAL at 17:46

## 2018-09-25 RX ADMIN — CLOZAPINE 300 MG: 100 TABLET ORAL at 21:38

## 2018-09-25 RX ADMIN — CARVEDILOL 25 MG: 25 TABLET, FILM COATED ORAL at 10:42

## 2018-09-25 NOTE — ED NOTES
Notified dr of need for admission to [facility] answering machine received, message left will await return call     Conchis Farr RN  09/25/18 [id number]
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
## 2018-09-25 NOTE — ED PROVIDER NOTES
trismus  Neck: Supple, full ROM, non tender to palpation in the midline, no stridor, no crepitus, no meningeal signs  Pulmonary: Lungs clear to auscultation bilaterally, no wheezes, rales, or rhonchi. Not in respiratory distress  Cardiovascular:  Regular rate. Regular rhythm. No murmurs, gallops, or rubs. 2+ distal pulses  Chest: no chest wall tenderness  Abdomen: Soft. Non tender. Non distended. +BS. No rebound, guarding, or rigidity. No pulsatile masses appreciated. Musculoskeletal: Moves all extremities x 4. Warm and well perfused, no clubbing, cyanosis, or edema. Capillary refill <3 seconds  Skin: warm and dry. No rashes. Neurologic: GCS 15, CN 2-12 grossly intact, no focal deficits, symmetric strength 5/5 in the upper and lower extremities bilaterally  Psych: affect flat not homicidal or suicidal    -------------------------------------------------- RESULTS -------------------------------------------------  I have personally reviewed all laboratory and imaging results for this patient. Results are listed below.      LABS:  Results for orders placed or performed during the hospital encounter of 09/24/18   Valproic acid level, total   Result Value Ref Range    Valproic Acid Lvl 35 (L) 50 - 100 mcg/mL   CBC   Result Value Ref Range    WBC 7.1 4.5 - 11.5 E9/L    RBC 4.55 3.80 - 5.80 E12/L    Hemoglobin 11.8 (L) 12.5 - 16.5 g/dL    Hematocrit 36.8 (L) 37.0 - 54.0 %    MCV 80.9 80.0 - 99.9 fL    MCH 25.9 (L) 26.0 - 35.0 pg    MCHC 32.1 32.0 - 34.5 %    RDW 14.6 11.5 - 15.0 fL    Platelets 857 379 - 865 E9/L    MPV 9.5 7.0 - 12.0 fL   Comprehensive Metabolic Panel   Result Value Ref Range    Sodium 136 132 - 146 mmol/L    Potassium 3.9 3.5 - 5.0 mmol/L    Chloride 97 (L) 98 - 107 mmol/L    CO2 25 22 - 29 mmol/L    Anion Gap 14 7 - 16 mmol/L    Glucose 111 (H) 74 - 109 mg/dL    BUN 11 6 - 20 mg/dL    CREATININE 1.2 0.7 - 1.2 mg/dL    GFR Non-African American >60 >=60 mL/min/1.73    GFR African American >60 Calcium 9.4 8.6 - 10.2 mg/dL    Total Protein 7.2 6.4 - 8.3 g/dL    Alb 4.2 3.5 - 5.2 g/dL    Total Bilirubin <0.2 0.0 - 1.2 mg/dL    Alkaline Phosphatase 75 40 - 129 U/L    ALT 10 0 - 40 U/L    AST 12 0 - 39 U/L   Serum Drug Screen   Result Value Ref Range    Ethanol Lvl <10 mg/dL    Acetaminophen Level <5.0 (L) 10.0 - 01.7 mcg/mL    Salicylate, Serum <9.5 0.0 - 30.0 mg/dL    TCA Scrn NEGATIVE Cutoff:300 ng/mL   Urine Drug Screen   Result Value Ref Range    Amphetamine Screen, Urine NOT DETECTED Negative <1000 ng/mL    Barbiturate Screen, Ur NOT DETECTED Negative < 200 ng/mL    Benzodiazepine Screen, Urine NOT DETECTED Negative < 200 ng/mL    Cannabinoid Scrn, Ur NOT DETECTED Negative < 50ng/mL    Cocaine Metabolite Screen, Urine NOT DETECTED Negative < 300 ng/mL    Opiate Scrn, Ur NOT DETECTED Negative < 300ng/mL    PCP Screen, Urine NOT DETECTED Negative < 25 ng/mL    Methadone Screen, Urine NOT DETECTED Negative <300 ng/mL    Propoxyphene Scrn, Ur NOT DETECTED Negative <300 ng/mL       RADIOLOGY:  Interpreted by Radiologist.  No orders to display         EKG Interpretation        ------------------------- NURSING NOTES AND VITALS REVIEWED ---------------------------   The nursing notes within the ED encounter and vital signs as below have been reviewed by myself. BP (!) 141/85   Pulse 87   Temp 97.1 °F (36.2 °C)   Resp 18   Wt 267 lb (121.1 kg)   SpO2 98%   BMI 39.43 kg/m²   Oxygen Saturation Interpretation: Normal    The patients available past medical records and past encounters were reviewed. ------------------------------ ED COURSE/MEDICAL DECISION MAKING----------------------  Medications - No data to display          Medical Decision Making:        Re-Evaluations:             Re-evaluation. Patients symptoms show no change      Consultations:              to evaluate    Critical Care:          This patient's ED course included: a personal history and physicial eaxmination    This patient has been closely monitored during their ED course. Counseling: The emergency provider has spoken with the patient and discussed todays results, in addition to providing specific details for the plan of care and counseling regarding the diagnosis and prognosis. Questions are answered at this time and they are agreeable with the plan.       --------------------------------- IMPRESSION AND DISPOSITION ---------------------------------    IMPRESSION  1. Patient needs psychiatric hold for evaluation        DISPOSITION  Disposition: social work to evaluate  Patient condition is stable    Patient is medically cleared    NOTE: This report was transcribed using voice recognition software.  Every effort was made to ensure accuracy; however, inadvertent computerized transcription errors may be present          Peggy Duarte MD  09/25/18 4131 [place] Harry Hogan MD  09/25/18 6616

## 2018-09-26 PROBLEM — F20.0 PARANOID SCHIZOPHRENIA (HCC): Status: ACTIVE | Noted: 2018-09-26

## 2018-09-26 LAB
CHOLESTEROL, TOTAL: 127 MG/DL (ref 0–199)
HBA1C MFR BLD: 7 % (ref 4–5.6)
HDLC SERPL-MCNC: 31 MG/DL
LDL CHOLESTEROL CALCULATED: 66 MG/DL (ref 0–99)
TRIGL SERPL-MCNC: 150 MG/DL (ref 0–149)
VLDLC SERPL CALC-MCNC: 30 MG/DL

## 2018-09-26 PROCEDURE — 36415 COLL VENOUS BLD VENIPUNCTURE: CPT

## 2018-09-26 PROCEDURE — 6370000000 HC RX 637 (ALT 250 FOR IP): Performed by: NURSE PRACTITIONER

## 2018-09-26 PROCEDURE — 6370000000 HC RX 637 (ALT 250 FOR IP): Performed by: PSYCHIATRY & NEUROLOGY

## 2018-09-26 PROCEDURE — 99222 1ST HOSP IP/OBS MODERATE 55: CPT | Performed by: PSYCHIATRY & NEUROLOGY

## 2018-09-26 PROCEDURE — 6370000000 HC RX 637 (ALT 250 FOR IP): Performed by: EMERGENCY MEDICINE

## 2018-09-26 PROCEDURE — 83036 HEMOGLOBIN GLYCOSYLATED A1C: CPT

## 2018-09-26 PROCEDURE — 1240000000 HC EMOTIONAL WELLNESS R&B

## 2018-09-26 PROCEDURE — 80061 LIPID PANEL: CPT

## 2018-09-26 RX ORDER — PALIPERIDONE 3 MG/1
3 TABLET, EXTENDED RELEASE ORAL EVERY MORNING
Status: DISCONTINUED | OUTPATIENT
Start: 2018-09-27 | End: 2018-10-01 | Stop reason: HOSPADM

## 2018-09-26 RX ORDER — FUROSEMIDE 40 MG/1
40 TABLET ORAL EVERY OTHER DAY
Status: DISCONTINUED | OUTPATIENT
Start: 2018-09-26 | End: 2018-10-01 | Stop reason: HOSPADM

## 2018-09-26 RX ORDER — GLIMEPIRIDE 4 MG/1
4 TABLET ORAL
Status: DISCONTINUED | OUTPATIENT
Start: 2018-09-27 | End: 2018-10-01 | Stop reason: HOSPADM

## 2018-09-26 RX ORDER — PANTOPRAZOLE SODIUM 40 MG/1
40 TABLET, DELAYED RELEASE ORAL
Status: DISCONTINUED | OUTPATIENT
Start: 2018-09-27 | End: 2018-09-28

## 2018-09-26 RX ORDER — BENZTROPINE MESYLATE 2 MG/1
2 TABLET ORAL 2 TIMES DAILY
Status: DISCONTINUED | OUTPATIENT
Start: 2018-09-26 | End: 2018-10-01 | Stop reason: HOSPADM

## 2018-09-26 RX ORDER — ATORVASTATIN CALCIUM 40 MG/1
40 TABLET, FILM COATED ORAL NIGHTLY
Status: DISCONTINUED | OUTPATIENT
Start: 2018-09-26 | End: 2018-10-01 | Stop reason: HOSPADM

## 2018-09-26 RX ORDER — DEXLANSOPRAZOLE 60 MG/1
60 CAPSULE, DELAYED RELEASE ORAL DAILY
COMMUNITY
End: 2018-10-15 | Stop reason: SDUPTHER

## 2018-09-26 RX ORDER — CLOZAPINE 100 MG/1
50 TABLET ORAL 2 TIMES DAILY
Status: DISCONTINUED | OUTPATIENT
Start: 2018-09-26 | End: 2018-09-27

## 2018-09-26 RX ADMIN — ALUMINUM HYDROXIDE, MAGNESIUM HYDROXIDE, AND SIMETHICONE 30 ML: 200; 200; 20 SUSPENSION ORAL at 14:09

## 2018-09-26 RX ADMIN — ALUMINUM HYDROXIDE, MAGNESIUM HYDROXIDE, AND SIMETHICONE 30 ML: 200; 200; 20 SUSPENSION ORAL at 10:19

## 2018-09-26 RX ADMIN — ENALAPRIL MALEATE 10 MG: 5 TABLET ORAL at 09:39

## 2018-09-26 RX ADMIN — METFORMIN HYDROCHLORIDE 1000 MG: 1000 TABLET ORAL at 09:39

## 2018-09-26 RX ADMIN — METFORMIN HYDROCHLORIDE 1000 MG: 1000 TABLET ORAL at 16:53

## 2018-09-26 RX ADMIN — CARVEDILOL 25 MG: 25 TABLET, FILM COATED ORAL at 09:38

## 2018-09-26 RX ADMIN — SPIRONOLACTONE 50 MG: 25 TABLET ORAL at 09:40

## 2018-09-26 RX ADMIN — CARVEDILOL 25 MG: 25 TABLET, FILM COATED ORAL at 16:53

## 2018-09-26 RX ADMIN — ALUMINUM HYDROXIDE, MAGNESIUM HYDROXIDE, AND SIMETHICONE 30 ML: 200; 200; 20 SUSPENSION ORAL at 19:50

## 2018-09-26 RX ADMIN — BENZTROPINE MESYLATE 2 MG: 2 TABLET ORAL at 21:47

## 2018-09-26 RX ADMIN — DIVALPROEX SODIUM 500 MG: 500 TABLET, DELAYED RELEASE ORAL at 21:47

## 2018-09-26 RX ADMIN — LINAGLIPTIN 5 MG: 5 TABLET, FILM COATED ORAL at 09:40

## 2018-09-26 RX ADMIN — CLOZAPINE 50 MG: 100 TABLET ORAL at 21:47

## 2018-09-26 RX ADMIN — TRAZODONE HYDROCHLORIDE 50 MG: 50 TABLET ORAL at 21:47

## 2018-09-26 ASSESSMENT — PATIENT HEALTH QUESTIONNAIRE - PHQ9: SUM OF ALL RESPONSES TO PHQ QUESTIONS 1-9: 7

## 2018-09-26 ASSESSMENT — SLEEP AND FATIGUE QUESTIONNAIRES
AVERAGE NUMBER OF SLEEP HOURS: 5
SLEEP PATTERN: DIFFICULTY FALLING ASLEEP;DISTURBED/INTERRUPTED SLEEP;INSOMNIA
DIFFICULTY STAYING ASLEEP: YES
DIFFICULTY FALLING ASLEEP: YES
DO YOU USE A SLEEP AID: NO
RESTFUL SLEEP: NO
DO YOU HAVE DIFFICULTY SLEEPING: YES
DIFFICULTY ARISING: NO

## 2018-09-26 ASSESSMENT — PAIN SCALES - GENERAL: PAINLEVEL_OUTOF10: 0

## 2018-09-26 ASSESSMENT — LIFESTYLE VARIABLES: HISTORY_ALCOHOL_USE: NO

## 2018-09-26 NOTE — CARE COORDINATION
Met with pt to complete biopsychosocial and cssr-s. Pt was cooperative, friendly and communicative. Pt was unable to verbalize as to why he was brought to the hospital other than \"Im paranoid\" Pt was unable to tell sw that he was at [facility] when he was pink slipped to the hospital. Pt stated that he gets his medication filled through his primary care doctor, but he actually is a client of [facility]. Pt denied si, denied hi and denied a/v hallucinations. Pt stated he has not been med compliant, but is now committed to taking medication. Pt lives with Adan Johnson [phone] and stated he is able to return upon d/c. Pt signed rebecca and gave sw permission to speak with mom    Pt denied drug use but stated he does drink alcohol on occassion. Pt stated that he only drinks on the weekend and this is not a problem for him. Pt is active with [facility], sw will call to schedule appointments.

## 2018-09-26 NOTE — H&P
Attempt [] Substance Abuse     2. Protective Factors: [x] Controlled Environment         [x] Supportive Family []         [] Gnosticism Support     3. Level of Risk: [] Mild [] Moderate [x] Severe      Strengths & Weaknesses:    1. Strengths: [x] Ability to communicate feelings     [x] Independent ADL's     [] Supportive Family    [] Current Health Status     2. Weaknesses: [x] Emotional          [] Motivational     MEDICATIONS: Current Facility-Administered Medications: carvedilol (COREG) tablet 25 mg, 25 mg, Oral, BID WC  cloZAPine (CLOZARIL) tablet 300 mg, 300 mg, Oral, BID  divalproex (DEPAKOTE) DR tablet 500 mg, 500 mg, Oral, Nightly  enalapril (VASOTEC) tablet 10 mg, 10 mg, Oral, Daily  linagliptin (TRADJENTA) tablet 5 mg, 5 mg, Oral, Daily  metFORMIN (GLUCOPHAGE) tablet 1,000 mg, 1,000 mg, Oral, BID WC  spironolactone (ALDACTONE) tablet 50 mg, 50 mg, Oral, Daily  paliperidone (INVEGA) extended release tablet 9 mg, 9 mg, Oral, QAM  acetaminophen (TYLENOL) tablet 650 mg, 650 mg, Oral, Q4H PRN  hydrOXYzine (VISTARIL) capsule 50 mg, 50 mg, Oral, Q6H PRN  haloperidol lactate (HALDOL) injection 10 mg, 10 mg, Intramuscular, Q6H PRN  traZODone (DESYREL) tablet 50 mg, 50 mg, Oral, Nightly PRN  benztropine mesylate (COGENTIN) injection 2 mg, 2 mg, Intramuscular, BID PRN  magnesium hydroxide (MILK OF MAGNESIA) 400 MG/5ML suspension 30 mL, 30 mL, Oral, Daily PRN  aluminum & magnesium hydroxide-simethicone (MAALOX) 200-200-20 MG/5ML suspension 30 mL, 30 mL, Oral, PRN  nicotine (NICODERM CQ) 21 MG/24HR 1 patch, 1 patch, Transdermal, Daily    Medical Review of Systems:     All other than marked systmes have been reviewed and are all negative.     Constitutional Symptoms: []  fever []  Chills  Skin Symptoms: [] rash []  Pruritus   Eye Symptoms: [] Vision unchanged []  recent vision problems[] blurred vision   Respiratory Symptoms:[] shortness of breath [] cough  Cardiovascular Symptoms:  [] chest pain   [] palpitations Dysarthria  [] Incoherent       Appearance: [] Well Groomed  [x] Casual Dressed  [] Unkept  [] Disheveled          [] Normal weight[] Thin  [] Overweight  [] Obese           Attitude: [] Positive  [] Hostile  [] Demanding  [x] Guarded  [] Defensive         [x] Cooperative  []  Uncooperative      Behavior:  [] Normal Gait  [] Walks with Assistance  [] Vicki Chair    [] Walks with Walker  [x] In Hospital Bed  [] Sitting in Chair    Muscle-Skeletal:  [x] Normal Muscle Tone [] Muscle Atrophy       [] Abnormal Muscle Movement     Eye Contact:  [x] Good eye contact  [] Intermittent Eye Contact  [] Poor Eye Contact     Mood: [] Depressed  [x] Anxious  [x] Irritated  [] Euthymic   [] Angry [] Restless    Affect:  [] Congruent  [] Incongruent  [] Labile  [x] Constricted  [x] Flat  [x] Bizarre     Thought Process and Association:  [] Logical [] Illogical       [x] Linear and Goal Directed  [] Tangential  [] Circumstantial     Thought Content:  [] Denies [] Endorses [] Suicidal [] Homicidal  [x] Delusional      [x] Paranoid  [] Somatic  [] Grandiose    Perception: []  None  [x] Auditory   [] Visual  [] tactile   [] olfactory  [] Illusions         Insight: [] Intact  [] Fair  [x] Limited    Judgement:  [] Intact  [] Fair  [x] Limited        ASSESSMENT  Patient Active Problem List   Diagnosis    [facility]ive disorder, chronic condition with [facility] exacerbation [facility]    Schizophrenia    Chest pain    Cardiomyopathy, dilated, nonischemic (HCC)    H/O CHF    Automatic implantable cardioverter-defibrillator in situ    HTN (hypertension)    DM (diabetes mellitus) ([facility]    Obesity    RIKKI (obstructive sleep apnea)    GERD (gastroesophageal reflux disease)    Hiatal hernia    Tobacco abuse    Hyperlipemia    Paranoid schizophrenia ([facility]     Recommendations and plan of treatment:  1- admit to inpatient unit  2- Unit milleiu   3- Medication Management I discussed risk benefits and side effects of medications.  Patient is

## 2018-09-26 NOTE — PLAN OF CARE
Problem: Anger Management/Homicidal Ideation:  Goal: Absence of homicidal ideation  Absence of homicidal ideation  Outcome: Ongoing  Patient positive for homicidal ideations. Patient would not elaborate a plan and who the ideations are towards. Patient presents paranoid and isolative. Problem: Altered Mood, Depressive Behavior:  Goal: Ability to disclose and discuss suicidal ideas will improve  Ability to disclose and discuss suicidal ideas will improve  Outcome: Ongoing  Patient is irritable and isolative. Patient refused almost all of admission assessment. Patient currently denying suicidal ideation and denying AVH. Patient does admit to homicidal ideation but does not give any additional information. Patient makes poor eye contact and is internally stimulated. Patient is paranoid and religiously preoccupied. Patient following some commands. Will monitor closely.

## 2018-09-27 LAB — METER GLUCOSE: 171 MG/DL (ref 70–110)

## 2018-09-27 PROCEDURE — 6370000000 HC RX 637 (ALT 250 FOR IP): Performed by: EMERGENCY MEDICINE

## 2018-09-27 PROCEDURE — 1240000000 HC EMOTIONAL WELLNESS R&B

## 2018-09-27 PROCEDURE — 6370000000 HC RX 637 (ALT 250 FOR IP): Performed by: PSYCHIATRY & NEUROLOGY

## 2018-09-27 PROCEDURE — 82962 GLUCOSE BLOOD TEST: CPT

## 2018-09-27 PROCEDURE — 6370000000 HC RX 637 (ALT 250 FOR IP): Performed by: NURSE PRACTITIONER

## 2018-09-27 RX ORDER — CLOZAPINE 25 MG/1
75 TABLET ORAL 2 TIMES DAILY
Status: DISCONTINUED | OUTPATIENT
Start: 2018-09-27 | End: 2018-09-28

## 2018-09-27 RX ADMIN — ENALAPRIL MALEATE 10 MG: 5 TABLET ORAL at 09:18

## 2018-09-27 RX ADMIN — PANTOPRAZOLE SODIUM 40 MG: 40 TABLET, DELAYED RELEASE ORAL at 06:37

## 2018-09-27 RX ADMIN — CLOZAPINE 75 MG: 25 TABLET ORAL at 20:09

## 2018-09-27 RX ADMIN — PALIPERIDONE 3 MG: 3 TABLET, EXTENDED RELEASE ORAL at 09:19

## 2018-09-27 RX ADMIN — CARVEDILOL 25 MG: 25 TABLET, FILM COATED ORAL at 09:23

## 2018-09-27 RX ADMIN — LINAGLIPTIN 5 MG: 5 TABLET, FILM COATED ORAL at 09:18

## 2018-09-27 RX ADMIN — METFORMIN HYDROCHLORIDE 1000 MG: 1000 TABLET ORAL at 16:48

## 2018-09-27 RX ADMIN — SPIRONOLACTONE 50 MG: 25 TABLET ORAL at 09:18

## 2018-09-27 RX ADMIN — CLOZAPINE 75 MG: 25 TABLET ORAL at 09:23

## 2018-09-27 RX ADMIN — BENZTROPINE MESYLATE 2 MG: 2 TABLET ORAL at 20:08

## 2018-09-27 RX ADMIN — GLIMEPIRIDE 4 MG: 4 TABLET ORAL at 06:37

## 2018-09-27 RX ADMIN — BENZTROPINE MESYLATE 2 MG: 2 TABLET ORAL at 09:28

## 2018-09-27 RX ADMIN — TRAZODONE HYDROCHLORIDE 50 MG: 50 TABLET ORAL at 20:09

## 2018-09-27 RX ADMIN — METFORMIN HYDROCHLORIDE 1000 MG: 1000 TABLET ORAL at 09:18

## 2018-09-27 RX ADMIN — DIVALPROEX SODIUM 500 MG: 500 TABLET, DELAYED RELEASE ORAL at 20:08

## 2018-09-27 RX ADMIN — CARVEDILOL 25 MG: 25 TABLET, FILM COATED ORAL at 16:48

## 2018-09-27 RX ADMIN — ATORVASTATIN CALCIUM 40 MG: 40 TABLET, FILM COATED ORAL at 08:19

## 2018-09-27 ASSESSMENT — PAIN SCALES - GENERAL
PAINLEVEL_OUTOF10: 0

## 2018-09-27 NOTE — PROGRESS NOTES
REMAINS WATCHFUL AND GUARDED, BUT IN CONTROL. PREOCCUPIED WITH DELAYED RESPONSES, BUT DENIES HALLUCINATIONS AND DID NOT VOICE ANY DELUSIONS ON ASSESSMENT THIS SHIFT. IN ROOM EXCEPT FOR SUPPER.

## 2018-09-27 NOTE — PLAN OF CARE
Problem: Altered Mood, Deterioration in Function:  Goal: Ability to perform activities of daily living will improve  Ability to perform activities of daily living will improve   Outcome: Ongoing  PT. EATING MEALS. HYGIENE IS MARGINAL. Goal: Able to verbalize reality based thinking  Able to verbalize reality based thinking   Outcome: Ongoing  NO VOICED DELUSION ON A.M. ASSESSMENT.

## 2018-09-27 NOTE — PROGRESS NOTES
Group Therapy Note    Date: 9/27/2018  Start Time: 10:00  End Time:  10:45  Number of Participants: 8    Type of Group: Psychoeducation    Wellness Binder Information  Module Name:  Stress  Session Number:  1    Patient's Goal:  Patient will identify ways to manage daily stressors in recovery. Notes:  Patient was interactive during group sharing ways to manage daily stressors in recovery and shared one way to implement effective coping skills. Status After Intervention:  Improved    Participation Level:  Active Listener and Interactive    Participation Quality: Appropriate, Attentive, Sharing and Supportive      Speech:  normal      Thought Process/Content: Logical      Affective Functioning: Congruent      Mood: euthymic      Level of consciousness:  Alert, Oriented x4 and Attentive      Response to Learning: Able to verbalize current knowledge/experience, Able to verbalize/acknowledge new learning, Able to retain information, Capable of insight, Able to change behavior and Progressing to goal      Endings: None Reported    Modes of Intervention: Education, Support, Socialization, Exploration, Clarifying and Problem-solving      Discipline Responsible: Psychoeducational Specialist      Signature:  Giovanny Hopkins

## 2018-09-27 NOTE — PLAN OF CARE
Problem: Anger Management/Homicidal Ideation:  Goal: Absence of angry outbursts  Absence of angry outbursts   Outcome: Met This Shift  No outbursts. Goal: Absence of homicidal ideation  Absence of homicidal ideation   Outcome: Met This Shift  Denies homicidal ideations.

## 2018-09-27 NOTE — PROGRESS NOTES
Attended community meeting, shared goal for the day as to stay to myself. [facility]  History & Physical    Bonny Marroquin Patient Status:  Inpatient    2017 MRN [id number]   [facility] 1SE-B Attending Michelle Carl MD   Hosp Day # 0 PCP Heather Lim MD     CHIEF COMPLAINT: Pyloric Stenosis comfortably sleeping, nontoxic, in no apparent distress. Skin:   No rashes, no petechiae.  acne of face. HEENT:  MMM, oropharynx clear  Lungs:  Clear to auscultation B/L, no wheezing/coarseness, equal air entry B/L.   Chest:   Regular rate and rh consult Peds Surg to see in am, likely to OR for repair   DISPO: will need f/u with PCP Tavares Peter MD and Peds Surg    Family verbalized understanding and agreement with plan, all questions answered.  Patient's PCP will be updated with any changes

## 2018-09-27 NOTE — PROGRESS NOTES
PT. REMAINS WATCHFUL AND GUARDED. PT. HAS BEEN IN CONTROL. PT. DENIES SUICIDAL IDEATION, HOMICIDAL IDEATION, AND HALLUCINATIONS. PT. VOICES NO DELUSIONS ON ASSESSMENT, BUT IS HESITANT WITH ANSWERING QUESTIONS AND NEEDS CLARIFICATION OF INTENTION OF STAFF ON APPROACH. PT. HAS BEEN IN CONTROL. QUIET, SECLUSIVE TO SELF. MAIN FOCUS IS GETTING A SHOWER. TAKES MEDICATIONS, BUT DOES NOT ATTEND REGULAR GROUPS.

## 2018-09-27 NOTE — PROGRESS NOTES
DATE OF SERVICE:     9/27/2018    Summer Freeman Buena seen today for the purpose of continuation of care. Nursing, social work reports, laboratory studies and vital signs are reviewed. Patient chief complaint today is:             [] Depression      [] Anxiety        [x] Psychosis         [] Suicidal/Homicidal                         [] Delusions           [] Aggression          Subjective: Today patient remains paranoid and guarded. Patient is taking medications as ordered, denies AVH currently, but has them still. Patient is medication compliant. Sleep:  [] Good [x] Fair  [] Poor  Appetite:  [] Good [x] Fair  [] Poor    Depression:  [] Mild [] Moderate [] Severe                [] Constant [] Sporadic     Anxiety: [] Mild [] Moderate [x] Severe    [x] Constant [] Sporadic     Delusions: [] Mild [] Moderate [x] Severe     [x] Constant [] Sporadic     [x] Paranoid [] Somatic [] Grandiose     Hallucinations: [] Mild [] Moderate [x] Severe     [] Constant [x] Sporadic    [x] Auditory  [] Visual [] Tactile       Suicidal: [] Constant [] Sporadic  Homicidal: [] Constant [] Sporadic    Unscheduled Medications     [] Patient Receiving Emergency Medications \" Chemical Restraint\"   [] Requesting PRN medications for anxiety    Medical Review of Systems:     All other than marked systmes have been reviewed and are all negative.     Constitutional Symptoms: []  fever []  Chills  Skin Symptoms: [] rash []  Pruritus   Eye Symptoms: [] Vision unchanged []  recent vision problems[] blurred vision   Respiratory Symptoms:[] shortness of breath [] cough  Cardiovascular Symptoms:  [] chest pain   [] palpitations   Gastrointestinal Symptoms: []  abdominal pain []  nausea []  vomiting []  diarrhea  Genitourinary Symptoms: []  dysuria  []  hematuria   Musculoskeletal Symptoms: []  back pain []  muscle pain []  joint pain  Neurologic Symptoms: []  headache []  dizziness  Hematolymphoid Symptoms: [] Adenopathy [] Bruises   [] Schimosis       Psychiatric Review of systems  Delusions:  [] Denies [] Endorses   Withdrawals:  [] Denies [] Endorses    Hallucinations: [] Denies [] Endorses    Extra Pyramidal Symptoms: [] Denies [] Endorses      /62   Pulse 77   Temp 97.8 °F (36.6 °C) (Oral)   Resp 16   Ht 5' 10\" (1.778 m)   Wt 267 lb (121.1 kg)   SpO2 98%   BMI 38.31 kg/m²     Mental Status Examination:    Cognition:      [x] Alert  [x] Awake  [x] Oriented  [x] Person  [x] Place [x] Time      [] drowsy  [] tired  [] lethargic  [] distractable  [] Other     Attention/Concentration:   [x] Attentive  [] Distracted        Memory Recent and Remote: [x] Intact   [] Impaired [] Partially Impaired     Language: [] Able to recognize and name objects          [] Unable to recognize and name Objects    Fund of Knowledge:  [] Poor []  Fair  [] Good    Speech: [] Normal  [x] Soft  [] Slow  [] Fast [] Pressured            [] Loud [] Dysarthria  [] Incoherent    Appearance: [] Well Groomed  [x] Casual Dressed  [] Unkept  [] Disheveled          [] Normal weight[] Thin  [] Overweight  [] Obese           Attitude: [] Positive  [] Hostile  [] Demanding  [x] Guarded  [] Defensive         [x] Cooperative  []  Uncooperative      Behavior:  [x] Normal Gait  [] Walks with Assistance  [] Vicki Chair    [] Walks with Samra Oma  [] In Hospital Bed  [] Sitting in Chair    Muscle-Skeletal:  [x] Normal Muscle Tone [] Muscle Atrophy       [] Abnormal Muscle Movement     Eye Contact:  [] Good eye contact  [x] Intermittent Eye Contact  [] Poor Eye Contact     Mood: [] Depressed  [x] Anxious  [] Irritated  [] Euthymic   [] Angry [x] Restless    Affect:  [] Congruent  [] Incongruent  [] Labile  [x] Constricted  [x] Flat  [x] Bizarre     Thought Process and Association:  [] Logical [] Illogical       [] Linear and Goal Directed  [] Tangential  [x] Circumstantial     Thought Content:  [] Denies [] Endorses [] Suicidal [] Homicidal  [x] Delusional      [x] Paranoid  []

## 2018-09-28 PROCEDURE — 6370000000 HC RX 637 (ALT 250 FOR IP): Performed by: NURSE PRACTITIONER

## 2018-09-28 PROCEDURE — 6370000000 HC RX 637 (ALT 250 FOR IP): Performed by: PSYCHIATRY & NEUROLOGY

## 2018-09-28 PROCEDURE — 1240000000 HC EMOTIONAL WELLNESS R&B

## 2018-09-28 PROCEDURE — 6370000000 HC RX 637 (ALT 250 FOR IP): Performed by: EMERGENCY MEDICINE

## 2018-09-28 RX ORDER — DEXLANSOPRAZOLE 60 MG/1
60 CAPSULE, DELAYED RELEASE ORAL DAILY
Status: DISCONTINUED | OUTPATIENT
Start: 2018-09-28 | End: 2018-10-01 | Stop reason: HOSPADM

## 2018-09-28 RX ORDER — CLOZAPINE 100 MG/1
100 TABLET ORAL 2 TIMES DAILY
Status: DISCONTINUED | OUTPATIENT
Start: 2018-09-28 | End: 2018-10-01 | Stop reason: HOSPADM

## 2018-09-28 RX ORDER — DEXLANSOPRAZOLE 30 MG/1
60 CAPSULE, DELAYED RELEASE ORAL DAILY
Status: DISCONTINUED | OUTPATIENT
Start: 2018-09-28 | End: 2018-09-28 | Stop reason: SDUPTHER

## 2018-09-28 RX ADMIN — BENZTROPINE MESYLATE 2 MG: 2 TABLET ORAL at 20:37

## 2018-09-28 RX ADMIN — CLOZAPINE 75 MG: 25 TABLET ORAL at 09:11

## 2018-09-28 RX ADMIN — ATORVASTATIN CALCIUM 40 MG: 40 TABLET, FILM COATED ORAL at 20:37

## 2018-09-28 RX ADMIN — LINAGLIPTIN 5 MG: 5 TABLET, FILM COATED ORAL at 09:12

## 2018-09-28 RX ADMIN — HYDROXYZINE PAMOATE 50 MG: 50 CAPSULE ORAL at 17:08

## 2018-09-28 RX ADMIN — GLIMEPIRIDE 4 MG: 4 TABLET ORAL at 06:39

## 2018-09-28 RX ADMIN — ALUMINUM HYDROXIDE, MAGNESIUM HYDROXIDE, AND SIMETHICONE 30 ML: 200; 200; 20 SUSPENSION ORAL at 11:29

## 2018-09-28 RX ADMIN — CARVEDILOL 25 MG: 25 TABLET, FILM COATED ORAL at 09:11

## 2018-09-28 RX ADMIN — DEXLANSOPRAZOLE 60 MG: 60 CAPSULE, DELAYED RELEASE ORAL at 17:08

## 2018-09-28 RX ADMIN — FUROSEMIDE 40 MG: 40 TABLET ORAL at 09:12

## 2018-09-28 RX ADMIN — PALIPERIDONE 3 MG: 3 TABLET, EXTENDED RELEASE ORAL at 09:12

## 2018-09-28 RX ADMIN — PANTOPRAZOLE SODIUM 40 MG: 40 TABLET, DELAYED RELEASE ORAL at 06:39

## 2018-09-28 RX ADMIN — ENALAPRIL MALEATE 10 MG: 5 TABLET ORAL at 09:12

## 2018-09-28 RX ADMIN — DIVALPROEX SODIUM 500 MG: 500 TABLET, DELAYED RELEASE ORAL at 20:38

## 2018-09-28 RX ADMIN — METFORMIN HYDROCHLORIDE 1000 MG: 1000 TABLET ORAL at 09:12

## 2018-09-28 RX ADMIN — TRAZODONE HYDROCHLORIDE 50 MG: 50 TABLET ORAL at 20:38

## 2018-09-28 RX ADMIN — CLOZAPINE 100 MG: 100 TABLET ORAL at 20:38

## 2018-09-28 RX ADMIN — METFORMIN HYDROCHLORIDE 1000 MG: 1000 TABLET ORAL at 16:39

## 2018-09-28 RX ADMIN — SPIRONOLACTONE 50 MG: 25 TABLET ORAL at 09:12

## 2018-09-28 RX ADMIN — BENZTROPINE MESYLATE 2 MG: 2 TABLET ORAL at 09:12

## 2018-09-28 ASSESSMENT — PAIN SCALES - GENERAL: PAINLEVEL_OUTOF10: 0

## 2018-09-28 NOTE — PROGRESS NOTES
Patient attended community meeting/morning stretch. Patient identified goal for the day as: \"Stay true to myself\"                                                                         Group Therapy Note    Date: 9/28/2018  Start Time: 10:00  End Time:  10:45  Number of Participants: 6    Type of Group: Psychoeducation    Wellness Binder Information  Module Name:  Healthy vs. Unhealthy Relationships   Session Number:  4    Patient's Goal:  Patient will define aspects of healthy and unhealthy relationships. Notes:  Patient offered minimal interaction during group but was an active listener throughout. Patient left group USP through and did not return for group. Patient continues to not initiate conversation and observed to be starring at this staff member when asked a question. Status After Intervention:  Unchanged    Participation Level:  Active Listener    Participation Quality: Attentive      Speech:  normal      Thought Process/Content: Logical      Affective Functioning: Constricted/Restricted      Mood: angry and irritable      Level of consciousness:  Alert, Oriented x4 and Attentive      Response to Learning: Progressing to goal      Endings: None Reported    Modes of Intervention: Education, Support, Socialization, Exploration, Clarifying and Problem-solving      Discipline Responsible: Psychoeducational Specialist      Signature:  Jazmín Carmichael

## 2018-09-28 NOTE — PROGRESS NOTES
Adenopathy [] Bruises   [] Schimosis       Psychiatric Review of systems  Delusions:  [] Denies [] Endorses   Withdrawals:  [] Denies [] Endorses    Hallucinations: [] Denies [] Endorses    Extra Pyramidal Symptoms: [] Denies [] Endorses      /80   Pulse 87   Temp 97.9 °F (36.6 °C) (Oral)   Resp 20   Ht 5' 10\" (1.778 m)   Wt 267 lb (121.1 kg)   SpO2 98%   BMI 38.31 kg/m²     Mental Status Examination:    Cognition:      [x] Alert  [x] Awake  [x] Oriented  [x] Person  [x] Place [x] Time      [] drowsy  [] tired  [] lethargic  [] distractable  [] Other     Attention/Concentration:   [x] Attentive  [] Distracted        Memory Recent and Remote: [x] Intact   [] Impaired [] Partially Impaired     Language: [] Able to recognize and name objects          [] Unable to recognize and name Objects    Fund of Knowledge:  [] Poor []  Fair  [] Good    Speech: [] Normal  [x] Soft  [] Slow  [] Fast [] Pressured            [] Loud [] Dysarthria  [] Incoherent    Appearance: [] Well Groomed  [x] Casual Dressed  [] Unkept  [] Disheveled          [] Normal weight[] Thin  [] Overweight  [] Obese           Attitude: [] Positive  [] Hostile  [] Demanding  [x] Guarded  [] Defensive         [x] Cooperative  []  Uncooperative      Behavior:  [x] Normal Gait  [] Walks with Assistance  [] Vicki Chair    [] Walks with Raymona Marya  [] In Hospital Bed  [] Sitting in Chair    Muscle-Skeletal:  [x] Normal Muscle Tone [] Muscle Atrophy       [] Abnormal Muscle Movement     Eye Contact:  [x] Good eye contact  [] Intermittent Eye Contact  [] Poor Eye Contact     Mood: [] Depressed  [x] Anxious  [] Irritated  [] Euthymic   [] Angry [x] Restless    Affect:  [] Congruent  [] Incongruent  [] Labile  [x] Constricted  [] Flat  [x] Bizarre     Thought Process and Association:  [] Logical [] Illogical       [x] Linear and Goal Directed  [] Tangential  [] Circumstantial     Thought Content:  [] Denies [] Endorses [] Suicidal [] Homicidal  [x] Delusional      [x] Paranoid  [] Somatic  [] Grandiose    Perception: [x]  None  [] Auditory   [] Visual  [] tactile   [] olfactory  [] Illusions         Insight: [] Intact  [] Fair  [x] Limited    Judgement:  [] Intact  [] Fair  [x] Limited      Assessment/Plan:        Patient Active Problem List   Diagnosis Code    Schizoaffective disorder, chronic condition with acute exacerbation ([facility] F25.8    Schizophrenia F20.9    Chest pain R07.9    Cardiomyopathy, dilated, nonischemic (HCC) I42.0    H/O CHF Z86.79    Automatic implantable cardioverter-defibrillator in situ Z95.810    HTN (hypertension) I10    DM (diabetes mellitus) ([facility] E11.9    Obesity E66.9    RIKKI (obstructive sleep apnea) G47.33    GERD (gastroesophageal reflux disease) K21.9    Hiatal hernia K44.9    Tobacco abuse Z72.0    Hyperlipemia E78.5    [facility] F20.0         Plan:    []  Patient is refusing medications  [x] Improving as expected Will increase Clozaril to 100 mg BID   [] Not improving as expected   [] Worsening    []  At Baseline       Reason for more than one antipsychotic:  [] N/A  [] 3 failed monotherapy(drugs tried):  [] Cross over to a new antipsychotic  [] Taper to monotherapy from polypharmacy  [x] Augmentation of Clozapine therapy due to treatment resistance to single therapy      Signed:  Zee Marlow  9/28/2018  3:11 PM

## 2018-09-29 PROCEDURE — 6370000000 HC RX 637 (ALT 250 FOR IP): Performed by: PSYCHIATRY & NEUROLOGY

## 2018-09-29 PROCEDURE — 6370000000 HC RX 637 (ALT 250 FOR IP): Performed by: EMERGENCY MEDICINE

## 2018-09-29 PROCEDURE — 6370000000 HC RX 637 (ALT 250 FOR IP): Performed by: NURSE PRACTITIONER

## 2018-09-29 PROCEDURE — 1240000000 HC EMOTIONAL WELLNESS R&B

## 2018-09-29 RX ADMIN — SPIRONOLACTONE 50 MG: 25 TABLET ORAL at 08:36

## 2018-09-29 RX ADMIN — DIVALPROEX SODIUM 500 MG: 500 TABLET, DELAYED RELEASE ORAL at 20:53

## 2018-09-29 RX ADMIN — BENZTROPINE MESYLATE 2 MG: 2 TABLET ORAL at 08:35

## 2018-09-29 RX ADMIN — LINAGLIPTIN 5 MG: 5 TABLET, FILM COATED ORAL at 08:38

## 2018-09-29 RX ADMIN — METFORMIN HYDROCHLORIDE 1000 MG: 1000 TABLET ORAL at 16:57

## 2018-09-29 RX ADMIN — ENALAPRIL MALEATE 10 MG: 5 TABLET ORAL at 08:36

## 2018-09-29 RX ADMIN — PALIPERIDONE 3 MG: 3 TABLET, EXTENDED RELEASE ORAL at 09:05

## 2018-09-29 RX ADMIN — CARVEDILOL 25 MG: 25 TABLET, FILM COATED ORAL at 16:57

## 2018-09-29 RX ADMIN — GLIMEPIRIDE 4 MG: 4 TABLET ORAL at 07:15

## 2018-09-29 RX ADMIN — METFORMIN HYDROCHLORIDE 1000 MG: 1000 TABLET ORAL at 08:35

## 2018-09-29 RX ADMIN — DEXLANSOPRAZOLE 60 MG: 60 CAPSULE, DELAYED RELEASE ORAL at 08:35

## 2018-09-29 RX ADMIN — CLOZAPINE 100 MG: 100 TABLET ORAL at 20:53

## 2018-09-29 RX ADMIN — TRAZODONE HYDROCHLORIDE 50 MG: 50 TABLET ORAL at 20:53

## 2018-09-29 RX ADMIN — CLOZAPINE 100 MG: 100 TABLET ORAL at 08:36

## 2018-09-29 RX ADMIN — ATORVASTATIN CALCIUM 40 MG: 40 TABLET, FILM COATED ORAL at 20:53

## 2018-09-29 RX ADMIN — CARVEDILOL 25 MG: 25 TABLET, FILM COATED ORAL at 08:36

## 2018-09-29 RX ADMIN — BENZTROPINE MESYLATE 2 MG: 2 TABLET ORAL at 20:53

## 2018-09-29 NOTE — PLAN OF CARE
Problem: Altered Mood, Depressive Behavior:  Goal: Able to verbalize and/or display a decrease in depressive symptoms  Able to verbalize and/or display a decrease in depressive symptoms   Outcome: Ongoing      Problem: Altered Mood, Deterioration in Function:  Goal: Able to verbalize reality based thinking  Able to verbalize reality based thinking   Outcome: Ongoing  Patient keeps to self. Suspicious. Standing in the hallway - watchful. Denies suicidal and homicidal thoughts. Denies hallucinations. Will continue to observe and support.

## 2018-09-29 NOTE — PLAN OF CARE
Problem: Anger Management/Homicidal Ideation:  Goal: Ability to verbalize frustrations and anger appropriately will improve  Ability to verbalize frustrations and anger appropriately will improve   Outcome: Ongoing  Patient reports to having better control over his anger and rage. Patient states that it is still there however, he is having better control over it. Patient denies anxiety and depression, and was unsure as to what anxiety was. Patient denies SI, HI, and AVH, but reports to having AVH yesterday (9/28). Patient presents suspicious, sad, paranoid and isolative. Patient misinterprets frequently but has been in good control. Will monitor closely. Problem: Altered Mood, Depressive Behavior:  Goal: Able to verbalize and/or display a decrease in depressive symptoms  Able to verbalize and/or display a decrease in depressive symptoms   Outcome: Ongoing  Patient denies depression but presents depressed and saddened.  Patient denies SI.

## 2018-09-30 PROCEDURE — 1240000000 HC EMOTIONAL WELLNESS R&B

## 2018-09-30 PROCEDURE — 6370000000 HC RX 637 (ALT 250 FOR IP): Performed by: NURSE PRACTITIONER

## 2018-09-30 PROCEDURE — 6370000000 HC RX 637 (ALT 250 FOR IP): Performed by: EMERGENCY MEDICINE

## 2018-09-30 PROCEDURE — 6370000000 HC RX 637 (ALT 250 FOR IP): Performed by: PSYCHIATRY & NEUROLOGY

## 2018-09-30 RX ADMIN — CLOZAPINE 100 MG: 100 TABLET ORAL at 08:27

## 2018-09-30 RX ADMIN — BENZTROPINE MESYLATE 2 MG: 2 TABLET ORAL at 08:27

## 2018-09-30 RX ADMIN — DEXLANSOPRAZOLE 60 MG: 60 CAPSULE, DELAYED RELEASE ORAL at 08:28

## 2018-09-30 RX ADMIN — METFORMIN HYDROCHLORIDE 1000 MG: 1000 TABLET ORAL at 17:19

## 2018-09-30 RX ADMIN — ATORVASTATIN CALCIUM 40 MG: 40 TABLET, FILM COATED ORAL at 21:07

## 2018-09-30 RX ADMIN — CARVEDILOL 25 MG: 25 TABLET, FILM COATED ORAL at 17:18

## 2018-09-30 RX ADMIN — PALIPERIDONE 3 MG: 3 TABLET, EXTENDED RELEASE ORAL at 08:28

## 2018-09-30 RX ADMIN — BENZTROPINE MESYLATE 2 MG: 2 TABLET ORAL at 21:07

## 2018-09-30 RX ADMIN — CLOZAPINE 100 MG: 100 TABLET ORAL at 21:07

## 2018-09-30 RX ADMIN — TRAZODONE HYDROCHLORIDE 50 MG: 50 TABLET ORAL at 21:07

## 2018-09-30 RX ADMIN — ENALAPRIL MALEATE 10 MG: 5 TABLET ORAL at 08:28

## 2018-09-30 RX ADMIN — FUROSEMIDE 40 MG: 40 TABLET ORAL at 08:28

## 2018-09-30 RX ADMIN — METFORMIN HYDROCHLORIDE 1000 MG: 1000 TABLET ORAL at 08:27

## 2018-09-30 RX ADMIN — CARVEDILOL 25 MG: 25 TABLET, FILM COATED ORAL at 08:27

## 2018-09-30 RX ADMIN — SPIRONOLACTONE 50 MG: 25 TABLET ORAL at 08:28

## 2018-09-30 RX ADMIN — DIVALPROEX SODIUM 500 MG: 500 TABLET, DELAYED RELEASE ORAL at 21:07

## 2018-09-30 RX ADMIN — GLIMEPIRIDE 4 MG: 4 TABLET ORAL at 06:58

## 2018-09-30 NOTE — PLAN OF CARE
Problem: Altered Mood, Depressive Behavior:  Goal: Able to verbalize support systems  Able to verbalize support systems   Outcome: Ongoing  Patient reports that his mom is a good support system, but \"Thats all I got\"    Problem: Altered Mood, Deterioration in Function:  Goal: Able to verbalize reality based thinking  Able to verbalize reality based thinking   Outcome: Ongoing  Patient is isolative and seclusive. Patient stays most of his day in his room, when out he is paranoid and suspicious of staff and his peers. Patient is blocking at times, and vague with his story. Patient denies SI, HI, and AVH. Patient denies anxiety and depression but displays symptoms of both. Patient is eating and sleeping well. Attending sporadic groups.

## 2018-09-30 NOTE — PROGRESS NOTES
[x]  None  [] Auditory   [] Visual  [] tactile   [] olfactory  [] Illusions         Insight: [] Intact  [] Fair  [x] Limited    Judgement:  [] Intact  [] Fair  [x] Limited      Assessment/Plan:        Patient Active Problem List   Diagnosis Code    Schizoaffective disorder, chronic condition with acute exacerbation ([facility] F25.8    Schizophrenia F20.9    Chest pain R07.9    Cardiomyopathy, dilated, nonischemic (HCC) I42.0    H/O CHF Z86.79    Automatic implantable cardioverter-defibrillator in situ Z95.810    HTN (hypertension) I10    DM [facility]diabetes mellitus) [facility] E11.9    Obesity E66.9    RIKKI (obstructive sleep apnea) G47.33    GERD (gastroesophageal reflux disease) K21.9    Hiatal hernia K44.9    Tobacco abuse Z72.0    Hyperlipemia E78.5    Paranoid schizophrenia ([facility] F20.0         Plan:    []  Patient is refusing medications  [x] Improving as expected    [] Not improving as expected   [] Worsening    []  At Baseline     Continue current meds  [place] milieu  Cont psychoeducation and coping skills.      Reason for more than one antipsychotic:  [] N/A  [] 3 failed monotherapy(drugs tried):  [] Cross over to a new antipsychotic  [] Taper to monotherapy from polypharmacy  [x] Augmentation of Clozapine therapy due to treatment resistance to single therapy      Signed:  Angelique Diehl  9/30/2018  2:06 PM

## 2018-10-01 VITALS
OXYGEN SATURATION: 98 % | HEIGHT: 70 IN | HEART RATE: 82 BPM | SYSTOLIC BLOOD PRESSURE: 113 MMHG | TEMPERATURE: 98 F | RESPIRATION RATE: 18 BRPM | BODY MASS INDEX: 38.22 KG/M2 | DIASTOLIC BLOOD PRESSURE: 67 MMHG | WEIGHT: 267 LBS

## 2018-10-01 PROCEDURE — 6370000000 HC RX 637 (ALT 250 FOR IP): Performed by: NURSE PRACTITIONER

## 2018-10-01 PROCEDURE — 99238 HOSP IP/OBS DSCHRG MGMT 30/<: CPT | Performed by: PSYCHIATRY & NEUROLOGY

## 2018-10-01 PROCEDURE — 6370000000 HC RX 637 (ALT 250 FOR IP): Performed by: EMERGENCY MEDICINE

## 2018-10-01 PROCEDURE — 6370000000 HC RX 637 (ALT 250 FOR IP): Performed by: PSYCHIATRY & NEUROLOGY

## 2018-10-01 RX ORDER — NICOTINE 21 MG/24HR
1 PATCH, TRANSDERMAL 24 HOURS TRANSDERMAL DAILY
Qty: 30 PATCH | Refills: 0 | Status: SHIPPED | OUTPATIENT
Start: 2018-10-02 | End: 2018-10-10

## 2018-10-01 RX ORDER — DIVALPROEX SODIUM 500 MG/1
250 TABLET, DELAYED RELEASE ORAL 3 TIMES DAILY
Qty: 90 TABLET | Refills: 0 | Status: ON HOLD | OUTPATIENT
Start: 2018-10-01 | End: 2019-06-07 | Stop reason: HOSPADM

## 2018-10-01 RX ORDER — CLOZAPINE 100 MG/1
100 TABLET ORAL 2 TIMES DAILY
Qty: 30 TABLET | Refills: 0 | Status: ON HOLD | OUTPATIENT
Start: 2018-10-01 | End: 2019-06-07 | Stop reason: HOSPADM

## 2018-10-01 RX ORDER — TRAZODONE HYDROCHLORIDE 50 MG/1
50 TABLET ORAL NIGHTLY PRN
Qty: 30 TABLET | Refills: 0 | Status: ON HOLD | OUTPATIENT
Start: 2018-10-01 | End: 2020-07-20

## 2018-10-01 RX ADMIN — BENZTROPINE MESYLATE 2 MG: 2 TABLET ORAL at 09:08

## 2018-10-01 RX ADMIN — LINAGLIPTIN 5 MG: 5 TABLET, FILM COATED ORAL at 09:08

## 2018-10-01 RX ADMIN — DEXLANSOPRAZOLE 60 MG: 60 CAPSULE, DELAYED RELEASE ORAL at 09:10

## 2018-10-01 RX ADMIN — CARVEDILOL 25 MG: 25 TABLET, FILM COATED ORAL at 09:08

## 2018-10-01 RX ADMIN — CLOZAPINE 100 MG: 100 TABLET ORAL at 09:07

## 2018-10-01 RX ADMIN — PALIPERIDONE 3 MG: 3 TABLET, EXTENDED RELEASE ORAL at 09:08

## 2018-10-01 RX ADMIN — SPIRONOLACTONE 50 MG: 25 TABLET ORAL at 09:08

## 2018-10-01 RX ADMIN — GLIMEPIRIDE 4 MG: 4 TABLET ORAL at 06:49

## 2018-10-01 RX ADMIN — ENALAPRIL MALEATE 10 MG: 5 TABLET ORAL at 09:08

## 2018-10-01 RX ADMIN — METFORMIN HYDROCHLORIDE 1000 MG: 1000 TABLET ORAL at 09:08

## 2018-10-01 NOTE — PROGRESS NOTES
Patient c/o of nausea/vommiting. CINA is 24. GIven PO medication and patient was not able to keep it down. Called Dr. Hector Shukla left message to get phenergan for patient. Observing closely.

## 2018-10-01 NOTE — DISCHARGE SUMMARY
Physician Discharge Summary      Physical Examination   VS/Measurements:     /67   Pulse 82   Temp 98 °F (36.7 °C) (Oral)   Resp 18   Ht 5' 10\" (1.778 m)   Wt 267 lb (121.1 kg)   SpO2 98%   BMI 38.31 kg/m²         Discharge Medications:                    Medication List      START taking these medications    nicotine 21 MG/24HR  Commonly known as:  NICODERM CQ  Place 1 patch onto the skin daily     traZODone 50 MG tablet  Commonly known as:  DESYREL  Take 1 tablet by mouth nightly as needed for Sleep        CHANGE how you take these medications    DEXILANT 60 MG Cpdr delayed release capsule  Generic drug:  dexlansoprazole  What changed:  Another medication with the same name was removed. Continue taking this medication, and follow the directions you see here.      divalproex 500 MG DR tablet  Commonly known as:  DEPAKOTE  Take 1 tablet by mouth 3 times daily  What changed:  when to take this        CONTINUE taking these medications    atorvastatin 40 MG tablet  Commonly known as:  LIPITOR  Take 1 tablet by mouth daily     carvedilol 25 MG tablet  Commonly known as:  COREG  TAKE 1 & 1/2 TABLETS BY MOUTH TWICE A DAY WITH MEALS     cloZAPine 100 MG tablet  Commonly known as:  CLOZARIL  Take 1 tablet by mouth 2 times daily     enalapril 10 MG tablet  Commonly known as:  VASOTEC  TAKE 1 TABLET BY MOUTH TWICE A DAY     furosemide 40 MG tablet  Commonly known as:  LASIX  TAKE ONE TABLET BY MOUTH EVERY OTHER DAY     glimepiride 4 MG tablet  Commonly known as:  AMARYL  Take 1 tablet by mouth every morning (before breakfast)     linagliptin 5 MG tablet  Commonly known as:  TRADJENTA  Take 1 tablet by mouth daily     metFORMIN 1000 MG tablet  Commonly known as:  GLUCOPHAGE  TAKE 1 TABLET BY MOUTH TWICE A DAY WITH MEALS     spironolactone 50 MG tablet  Commonly known as:  ALDACTONE  Take 1 tablet by mouth daily        STOP taking these medications    benztropine 2 MG tablet  Commonly known as:  COGENTIN

## 2018-10-05 ENCOUNTER — HOSPITAL ENCOUNTER (OUTPATIENT)
Age: 38
Discharge: HOME | End: 2018-10-05
Payer: MEDICARE

## 2018-10-05 LAB
AMMONIA: 12 UMOL/L (ref 16–60)
BASOPHILS ABSOLUTE: 0.02 E9/L (ref 0–0.2)
BASOPHILS RELATIVE PERCENT: 0.3 % (ref 0–2)
EOSINOPHILS ABSOLUTE: 0.05 E9/L (ref 0.05–0.5)
EOSINOPHILS RELATIVE PERCENT: 0.8 % (ref 0–6)
HCT VFR BLD CALC: 36.1 % (ref 37–54)
HEMOGLOBIN: 11.1 G/DL (ref 12.5–16.5)
IMMATURE GRANULOCYTES #: 0.08 E9/L
IMMATURE GRANULOCYTES %: 1.3 % (ref 0–5)
LYMPHOCYTES ABSOLUTE: 2.3 E9/L (ref 1.5–4)
LYMPHOCYTES RELATIVE PERCENT: 37.4 % (ref 20–42)
MCH RBC QN AUTO: 25.3 PG (ref 26–35)
MCHC RBC AUTO-ENTMCNC: 30.7 % (ref 32–34.5)
MCV RBC AUTO: 82.4 FL (ref 80–99.9)
MONOCYTES ABSOLUTE: 0.53 E9/L (ref 0.1–0.95)
MONOCYTES RELATIVE PERCENT: 8.6 % (ref 2–12)
NEUTROPHILS ABSOLUTE: 3.17 E9/L (ref 1.8–7.3)
NEUTROPHILS RELATIVE PERCENT: 51.6 % (ref 43–80)
PDW BLD-RTO: 14.5 FL (ref 11.5–15)
PLATELET # BLD: 208 E9/L (ref 130–450)
PMV BLD AUTO: 9.6 FL (ref 7–12)
RBC # BLD: 4.38 E12/L (ref 3.8–5.8)
VALPROIC ACID LEVEL: 102 MCG/ML (ref 50–100)
WBC # BLD: 6.2 E9/L (ref 4.5–11.5)

## 2018-10-05 PROCEDURE — 36415 COLL VENOUS BLD VENIPUNCTURE: CPT

## 2018-10-05 PROCEDURE — 80164 ASSAY DIPROPYLACETIC ACD TOT: CPT

## 2018-10-05 PROCEDURE — 82140 ASSAY OF AMMONIA: CPT

## 2018-10-05 PROCEDURE — 85025 COMPLETE CBC W/AUTO DIFF WBC: CPT

## 2018-10-10 ENCOUNTER — OFFICE VISIT (OUTPATIENT)
Dept: CARDIOLOGY CLINIC | Age: 38
End: 2018-10-10
Payer: MEDICARE

## 2018-10-10 VITALS
HEIGHT: 69 IN | SYSTOLIC BLOOD PRESSURE: 110 MMHG | OXYGEN SATURATION: 96 % | RESPIRATION RATE: 20 BRPM | BODY MASS INDEX: 38.45 KG/M2 | DIASTOLIC BLOOD PRESSURE: 60 MMHG | WEIGHT: 259.6 LBS | HEART RATE: 88 BPM

## 2018-10-10 DIAGNOSIS — R07.9 CHEST PAIN, UNSPECIFIED TYPE: Primary | ICD-10-CM

## 2018-10-10 DIAGNOSIS — I42.0 CARDIOMYOPATHY, DILATED, NONISCHEMIC (HCC): ICD-10-CM

## 2018-10-10 PROCEDURE — G8427 DOCREV CUR MEDS BY ELIG CLIN: HCPCS | Performed by: NURSE PRACTITIONER

## 2018-10-10 PROCEDURE — G8417 CALC BMI ABV UP PARAM F/U: HCPCS | Performed by: NURSE PRACTITIONER

## 2018-10-10 PROCEDURE — 93000 ELECTROCARDIOGRAM COMPLETE: CPT | Performed by: INTERNAL MEDICINE

## 2018-10-10 PROCEDURE — 99213 OFFICE O/P EST LOW 20 MIN: CPT | Performed by: NURSE PRACTITIONER

## 2018-10-10 PROCEDURE — 1111F DSCHRG MED/CURRENT MED MERGE: CPT | Performed by: NURSE PRACTITIONER

## 2018-10-10 PROCEDURE — 4004F PT TOBACCO SCREEN RCVD TLK: CPT | Performed by: NURSE PRACTITIONER

## 2018-10-10 PROCEDURE — G8484 FLU IMMUNIZE NO ADMIN: HCPCS | Performed by: NURSE PRACTITIONER

## 2018-10-10 NOTE — PROGRESS NOTES
OFFICE VISIT        PRIMARY CARE PHYSICIAN:      Renee Forrester MD         ALLERGIES / SENSITIVITIES:        Allergies   Allergen Reactions    Lisinopril      ? Cough          REVIEWED MEDICATIONS:        Current Outpatient Prescriptions:     enalapril (VASOTEC) 10 MG tablet, TAKE 1 TABLET BY MOUTH TWICE A DAY, Disp: 60 tablet, Rfl: 0    divalproex (DEPAKOTE) 500 MG DR tablet, Take 1 tablet by mouth 3 times daily, Disp: 90 tablet, Rfl: 0    traZODone (DESYREL) 50 MG tablet, Take 1 tablet by mouth nightly as needed for Sleep, Disp: 30 tablet, Rfl: 0    cloZAPine (CLOZARIL) 100 MG tablet, Take 1 tablet by mouth 2 times daily, Disp: 30 tablet, Rfl: 0    dexlansoprazole (DEXILANT) 60 MG CPDR delayed release capsule, Take 60 mg by mouth daily, Disp: , Rfl:     glimepiride (AMARYL) 4 MG tablet, Take 1 tablet by mouth every morning (before breakfast), Disp: 30 tablet, Rfl: 3    spironolactone (ALDACTONE) 50 MG tablet, Take 1 tablet by mouth daily, Disp: 90 tablet, Rfl: 3    atorvastatin (LIPITOR) 40 MG tablet, Take 1 tablet by mouth daily, Disp: 30 tablet, Rfl: 3    furosemide (LASIX) 40 MG tablet, TAKE ONE TABLET BY MOUTH EVERY OTHER DAY, Disp: 15 tablet, Rfl: 3    linagliptin (TRADJENTA) 5 MG tablet, Take 1 tablet by mouth daily, Disp: 30 tablet, Rfl: 3    carvedilol (COREG) 25 MG tablet, TAKE 1 & 1/2 TABLETS BY MOUTH TWICE A DAY WITH MEALS, Disp: 90 tablet, Rfl: 0    metFORMIN (GLUCOPHAGE) 1000 MG tablet, TAKE 1 TABLET BY MOUTH TWICE A DAY WITH MEALS, Disp: 60 tablet, Rfl: 0        S: REASON FOR VISIT:       Dilated cardiomyopathy. He is followed here by Dr Nikko Hummel. He was admitted to [facility] to the psychiatric unit. Medications were adjusted and he is doing much better. He continues to smoke. He is here with his mother . He is not very active and is mainly homebound he has no chest pain with exertion.  He gets chest pain while smoking, and at rest. He denies dyspnea, orthopnea, palpitations, dizziness presyncope , and syncope. Swelling of the lower extremities is intermittent. REVIEW OF SYSTEMS:    CONSTITUTIONAL:  Denies fevers, chills or night sweats. Denies fatigue. HEENT:  Denies headaches. Denies changes in hearing or vision. Denies dysphagia, hoarseness, hemoptysis, hematemesis or epistaxis. ENDOCRINE:  Denies polyphagia, polydipsia or polyuria. Denies heat or cold intolerance  MUSCULOSKELETAL:  Denies arthralgias or myalgias. SKIN:  Denies any rashes, ulcers or itching. HEME/LYMPH:  Denies palpable lymphadenopathy. Denies bleeding or easy bruisability. HEART:  As above. LUNGS:  Denies any cough or sputum production. GI: Denies abdominal pain, nausea, vomiting, diarrhea, constipation, rectal bleeding or tarry stools. :  Denies hematuria or dysuria. PSYCHIATRIC:  Schizophrenia Improved on new medication    NEUROLOGIC:  Denies memory loss, motor weakness, numbness, tingling or tremors. CARDIOVASCULAR HISTORY:    1. Dilated cardiomyopathy and congestive heart failure:  a. 1/2/08: Echocardiogram showed a moderately dilated left ventricle with an ejection fraction of 30-35% with Stage 3 diastolic dysfunction with mildly to moderately dilated left atrium and moderate mitral regurgitation. b. 1/7/08: Cardiac catheterization: Normal coronary arteries. Mildly dilated left ventricle with severely impaired left ventricular systolic function with an ejection fraction of 20%. c. 01/16/13, echocardiogram showed normal left ventricular size, wall thickness and wall motion with low normal left ventricular systolic function with an estimated ejection fraction of 50-55% with stage I left ventricular diastolic dysfunction, normal right ventricular size and function and pacemaker/ICD leads noted in the right ventricle. d. 4/25/08: ICD implantation. e. MUGA scan, 11/12/2014 reported as showing mild diffuse left ventricular hypokinesis with an EF of 46%.   f. 2-D echo thyromegaly. Sclerae not icteric. No xanthelasmas. Mucous membranes moist.  Chest:   Symmetrical and nontender. No deformities. ICD site in the left upper chest with minor keloid formation. Lungs:  Clear to auscultation bilaterally. Heart:   Normal S1 and S2. No S3 or S4. No murmurs or rubs. Abdomen:  Soft and nontender without organomegaly or masses. Normal bowel sounds. No bruits. Extremities:  trace  plus edemaOf lower extremities,es normal.   Skin  Normal turgor. No rashes or ulcers noted. Neuro:  flat affect mostly     REVIEW OF DIAGNOSTIC TESTS:    1. EKG done today shows sinus rhythm and was within normal limits. 2. Labs from September 26, 2018 reviewed in [facility] / DIAGNOSIS:   1. History of dilated cardiomyopathy and congestive heart failure: Compensated cardiac status with minimal signs or   symptoms of congestive heart failure/fluid overload. EF normalized by last echocardiogram in 2015  2. Hypertension: Adequately controlled. 3. Diabetes mellitus: On oral hypoglycemic agents. 4. Obesity. 5. Obstructive sleep apnea. Noncompliant with CPAP  6. Hiatal hernia and history of GERD. 7. Schizophrenia. 8. Tobacco abuse. 9. Gynecomastia  10. Atypical chest pain  11. Low HDL       TREATMENT PLAN:  1. Patient strongly advised smoking cessation. 2. Patient strongly advised aerobic exercise, watching diet and attempting to lose weight. 3. Follow up with cardiology in 6 months or on a PRN basis. 4. Follow up with Dr. Richy Zabala, patient declines   5. Continue current medications               [facility]  [address]  [place] [id number]  [phone]

## 2018-10-12 ENCOUNTER — HOSPITAL ENCOUNTER (OUTPATIENT)
Age: 38
Discharge: HOME | End: 2018-10-12
Payer: MEDICARE

## 2018-10-12 LAB
BASOPHILS ABSOLUTE: 0.03 E9/L (ref 0–0.2)
BASOPHILS RELATIVE PERCENT: 0.5 % (ref 0–2)
EOSINOPHILS ABSOLUTE: 0.06 E9/L (ref 0.05–0.5)
EOSINOPHILS RELATIVE PERCENT: 0.9 % (ref 0–6)
HCT VFR BLD CALC: 37.7 % (ref 37–54)
HEMOGLOBIN: 11.7 G/DL (ref 12.5–16.5)
IMMATURE GRANULOCYTES #: 0.13 E9/L
IMMATURE GRANULOCYTES %: 2 % (ref 0–5)
LYMPHOCYTES ABSOLUTE: 2.89 E9/L (ref 1.5–4)
LYMPHOCYTES RELATIVE PERCENT: 43.9 % (ref 20–42)
MCH RBC QN AUTO: 25.8 PG (ref 26–35)
MCHC RBC AUTO-ENTMCNC: 31 % (ref 32–34.5)
MCV RBC AUTO: 83 FL (ref 80–99.9)
MONOCYTES ABSOLUTE: 0.56 E9/L (ref 0.1–0.95)
MONOCYTES RELATIVE PERCENT: 8.5 % (ref 2–12)
NEUTROPHILS ABSOLUTE: 2.91 E9/L (ref 1.8–7.3)
NEUTROPHILS RELATIVE PERCENT: 44.2 % (ref 43–80)
PDW BLD-RTO: 14.6 FL (ref 11.5–15)
PLATELET # BLD: 195 E9/L (ref 130–450)
PMV BLD AUTO: 10.2 FL (ref 7–12)
RBC # BLD: 4.54 E12/L (ref 3.8–5.8)
WBC # BLD: 6.6 E9/L (ref 4.5–11.5)

## 2018-10-12 PROCEDURE — 85025 COMPLETE CBC W/AUTO DIFF WBC: CPT

## 2018-10-12 PROCEDURE — 36415 COLL VENOUS BLD VENIPUNCTURE: CPT

## 2018-10-22 ENCOUNTER — HOSPITAL ENCOUNTER (OUTPATIENT)
Age: 38
Discharge: HOME | End: 2018-10-22
Payer: MEDICARE

## 2018-10-22 LAB
BASOPHILS ABSOLUTE: 0.03 E9/L (ref 0–0.2)
BASOPHILS RELATIVE PERCENT: 0.4 % (ref 0–2)
EOSINOPHILS ABSOLUTE: 0.08 E9/L (ref 0.05–0.5)
EOSINOPHILS RELATIVE PERCENT: 1.2 % (ref 0–6)
HCT VFR BLD CALC: 39 % (ref 37–54)
HEMOGLOBIN: 12.2 G/DL (ref 12.5–16.5)
IMMATURE GRANULOCYTES #: 0.04 E9/L
IMMATURE GRANULOCYTES %: 0.6 % (ref 0–5)
LYMPHOCYTES ABSOLUTE: 3.01 E9/L (ref 1.5–4)
LYMPHOCYTES RELATIVE PERCENT: 43.4 % (ref 20–42)
MCH RBC QN AUTO: 25.9 PG (ref 26–35)
MCHC RBC AUTO-ENTMCNC: 31.3 % (ref 32–34.5)
MCV RBC AUTO: 82.8 FL (ref 80–99.9)
MONOCYTES ABSOLUTE: 0.78 E9/L (ref 0.1–0.95)
MONOCYTES RELATIVE PERCENT: 11.2 % (ref 2–12)
NEUTROPHILS ABSOLUTE: 3 E9/L (ref 1.8–7.3)
NEUTROPHILS RELATIVE PERCENT: 43.2 % (ref 43–80)
PDW BLD-RTO: 14.7 FL (ref 11.5–15)
PLATELET # BLD: 182 E9/L (ref 130–450)
PMV BLD AUTO: 10.7 FL (ref 7–12)
RBC # BLD: 4.71 E12/L (ref 3.8–5.8)
WBC # BLD: 6.9 E9/L (ref 4.5–11.5)

## 2018-10-22 PROCEDURE — 36415 COLL VENOUS BLD VENIPUNCTURE: CPT

## 2018-10-22 PROCEDURE — 85025 COMPLETE CBC W/AUTO DIFF WBC: CPT

## 2018-10-31 ENCOUNTER — HOSPITAL ENCOUNTER (OUTPATIENT)
Age: 38
Discharge: HOME | End: 2018-10-31
Payer: MEDICARE

## 2018-10-31 LAB
BASOPHILS ABSOLUTE: 0.03 E9/L (ref 0–0.2)
BASOPHILS RELATIVE PERCENT: 0.5 % (ref 0–2)
EOSINOPHILS ABSOLUTE: 0.14 E9/L (ref 0.05–0.5)
EOSINOPHILS RELATIVE PERCENT: 2.1 % (ref 0–6)
HCT VFR BLD CALC: 34.5 % (ref 37–54)
HEMOGLOBIN: 10.7 G/DL (ref 12.5–16.5)
IMMATURE GRANULOCYTES #: 0.1 E9/L
IMMATURE GRANULOCYTES %: 1.5 % (ref 0–5)
LYMPHOCYTES ABSOLUTE: 2.76 E9/L (ref 1.5–4)
LYMPHOCYTES RELATIVE PERCENT: 41.6 % (ref 20–42)
MCH RBC QN AUTO: 25.6 PG (ref 26–35)
MCHC RBC AUTO-ENTMCNC: 31 % (ref 32–34.5)
MCV RBC AUTO: 82.5 FL (ref 80–99.9)
MONOCYTES ABSOLUTE: 0.65 E9/L (ref 0.1–0.95)
MONOCYTES RELATIVE PERCENT: 9.8 % (ref 2–12)
NEUTROPHILS ABSOLUTE: 2.96 E9/L (ref 1.8–7.3)
NEUTROPHILS RELATIVE PERCENT: 44.5 % (ref 43–80)
PDW BLD-RTO: 15.6 FL (ref 11.5–15)
PLATELET # BLD: 166 E9/L (ref 130–450)
PMV BLD AUTO: 10 FL (ref 7–12)
RBC # BLD: 4.18 E12/L (ref 3.8–5.8)
WBC # BLD: 6.6 E9/L (ref 4.5–11.5)

## 2018-10-31 PROCEDURE — 36415 COLL VENOUS BLD VENIPUNCTURE: CPT

## 2018-10-31 PROCEDURE — 85025 COMPLETE CBC W/AUTO DIFF WBC: CPT

## 2018-11-07 ENCOUNTER — HOSPITAL ENCOUNTER (OUTPATIENT)
Age: 38
Discharge: HOME | End: 2018-11-07
Payer: MEDICARE

## 2018-11-07 LAB
BASOPHILS ABSOLUTE: 0.03 E9/L (ref 0–0.2)
BASOPHILS RELATIVE PERCENT: 0.4 % (ref 0–2)
EOSINOPHILS ABSOLUTE: 0.29 E9/L (ref 0.05–0.5)
EOSINOPHILS RELATIVE PERCENT: 3.8 % (ref 0–6)
HCT VFR BLD CALC: 35.8 % (ref 37–54)
HEMOGLOBIN: 11.3 G/DL (ref 12.5–16.5)
IMMATURE GRANULOCYTES #: 0.14 E9/L
IMMATURE GRANULOCYTES %: 1.8 % (ref 0–5)
LYMPHOCYTES ABSOLUTE: 3.62 E9/L (ref 1.5–4)
LYMPHOCYTES RELATIVE PERCENT: 46.9 % (ref 20–42)
MCH RBC QN AUTO: 26 PG (ref 26–35)
MCHC RBC AUTO-ENTMCNC: 31.6 % (ref 32–34.5)
MCV RBC AUTO: 82.5 FL (ref 80–99.9)
MONOCYTES ABSOLUTE: 0.63 E9/L (ref 0.1–0.95)
MONOCYTES RELATIVE PERCENT: 8.2 % (ref 2–12)
NEUTROPHILS ABSOLUTE: 3.01 E9/L (ref 1.8–7.3)
NEUTROPHILS RELATIVE PERCENT: 38.9 % (ref 43–80)
PDW BLD-RTO: 15.9 FL (ref 11.5–15)
PLATELET # BLD: 182 E9/L (ref 130–450)
PMV BLD AUTO: 9.9 FL (ref 7–12)
RBC # BLD: 4.34 E12/L (ref 3.8–5.8)
WBC # BLD: 7.7 E9/L (ref 4.5–11.5)

## 2018-11-07 PROCEDURE — 36415 COLL VENOUS BLD VENIPUNCTURE: CPT

## 2018-11-07 PROCEDURE — 85025 COMPLETE CBC W/AUTO DIFF WBC: CPT

## 2018-11-15 ENCOUNTER — HOSPITAL ENCOUNTER (OUTPATIENT)
Age: 38
Discharge: HOME | End: 2018-11-15
Payer: MEDICARE

## 2018-11-15 LAB
BASOPHILS ABSOLUTE: 0.02 E9/L (ref 0–0.2)
BASOPHILS RELATIVE PERCENT: 0.3 % (ref 0–2)
EOSINOPHILS ABSOLUTE: 0.23 E9/L (ref 0.05–0.5)
EOSINOPHILS RELATIVE PERCENT: 3.8 % (ref 0–6)
HCT VFR BLD CALC: 36 % (ref 37–54)
HEMOGLOBIN: 11.2 G/DL (ref 12.5–16.5)
IMMATURE GRANULOCYTES #: 0.1 E9/L
IMMATURE GRANULOCYTES %: 1.6 % (ref 0–5)
LYMPHOCYTES ABSOLUTE: 2.41 E9/L (ref 1.5–4)
LYMPHOCYTES RELATIVE PERCENT: 39.3 % (ref 20–42)
MCH RBC QN AUTO: 26.2 PG (ref 26–35)
MCHC RBC AUTO-ENTMCNC: 31.1 % (ref 32–34.5)
MCV RBC AUTO: 84.3 FL (ref 80–99.9)
MONOCYTES ABSOLUTE: 0.66 E9/L (ref 0.1–0.95)
MONOCYTES RELATIVE PERCENT: 10.8 % (ref 2–12)
NEUTROPHILS ABSOLUTE: 2.71 E9/L (ref 1.8–7.3)
NEUTROPHILS RELATIVE PERCENT: 44.2 % (ref 43–80)
PDW BLD-RTO: 16.1 FL (ref 11.5–15)
PLATELET # BLD: 183 E9/L (ref 130–450)
PMV BLD AUTO: 10.6 FL (ref 7–12)
RBC # BLD: 4.27 E12/L (ref 3.8–5.8)
WBC # BLD: 6.1 E9/L (ref 4.5–11.5)

## 2018-11-15 PROCEDURE — 85025 COMPLETE CBC W/AUTO DIFF WBC: CPT

## 2018-11-15 PROCEDURE — 36415 COLL VENOUS BLD VENIPUNCTURE: CPT

## 2018-11-19 PROBLEM — E11.9 TYPE 2 DIABETES MELLITUS, WITHOUT LONG-TERM CURRENT USE OF INSULIN (HCC): Status: ACTIVE | Noted: 2018-11-19

## 2018-11-26 ENCOUNTER — HOSPITAL ENCOUNTER (OUTPATIENT)
Age: 38
Discharge: HOME | End: 2018-11-26
Payer: MEDICARE

## 2018-11-26 LAB
BASOPHILS ABSOLUTE: 0.02 E9/L (ref 0–0.2)
BASOPHILS RELATIVE PERCENT: 0.3 % (ref 0–2)
EOSINOPHILS ABSOLUTE: 0.15 E9/L (ref 0.05–0.5)
EOSINOPHILS RELATIVE PERCENT: 2.1 % (ref 0–6)
HCT VFR BLD CALC: 35.5 % (ref 37–54)
HEMOGLOBIN: 11.1 G/DL (ref 12.5–16.5)
IMMATURE GRANULOCYTES #: 0.15 E9/L
IMMATURE GRANULOCYTES %: 2.1 % (ref 0–5)
LYMPHOCYTES ABSOLUTE: 2.83 E9/L (ref 1.5–4)
LYMPHOCYTES RELATIVE PERCENT: 40 % (ref 20–42)
MCH RBC QN AUTO: 25.9 PG (ref 26–35)
MCHC RBC AUTO-ENTMCNC: 31.3 % (ref 32–34.5)
MCV RBC AUTO: 82.9 FL (ref 80–99.9)
MONOCYTES ABSOLUTE: 0.8 E9/L (ref 0.1–0.95)
MONOCYTES RELATIVE PERCENT: 11.3 % (ref 2–12)
NEUTROPHILS ABSOLUTE: 3.13 E9/L (ref 1.8–7.3)
NEUTROPHILS RELATIVE PERCENT: 44.2 % (ref 43–80)
PDW BLD-RTO: 15.9 FL (ref 11.5–15)
PLATELET # BLD: 186 E9/L (ref 130–450)
PMV BLD AUTO: 10.8 FL (ref 7–12)
RBC # BLD: 4.28 E12/L (ref 3.8–5.8)
WBC # BLD: 7.1 E9/L (ref 4.5–11.5)

## 2018-11-26 PROCEDURE — 85025 COMPLETE CBC W/AUTO DIFF WBC: CPT

## 2018-11-26 PROCEDURE — 36415 COLL VENOUS BLD VENIPUNCTURE: CPT

## 2018-12-04 ENCOUNTER — HOSPITAL ENCOUNTER (OUTPATIENT)
Age: 38
Discharge: HOME | End: 2018-12-04
Payer: MEDICARE

## 2018-12-04 LAB
BASOPHILS ABSOLUTE: 0.02 E9/L (ref 0–0.2)
BASOPHILS RELATIVE PERCENT: 0.3 % (ref 0–2)
EOSINOPHILS ABSOLUTE: 0.12 E9/L (ref 0.05–0.5)
EOSINOPHILS RELATIVE PERCENT: 1.9 % (ref 0–6)
HCT VFR BLD CALC: 35 % (ref 37–54)
HEMOGLOBIN: 10.7 G/DL (ref 12.5–16.5)
IMMATURE GRANULOCYTES #: 0.13 E9/L
IMMATURE GRANULOCYTES %: 2 % (ref 0–5)
LYMPHOCYTES ABSOLUTE: 2.55 E9/L (ref 1.5–4)
LYMPHOCYTES RELATIVE PERCENT: 39.4 % (ref 20–42)
MCH RBC QN AUTO: 25.9 PG (ref 26–35)
MCHC RBC AUTO-ENTMCNC: 30.6 % (ref 32–34.5)
MCV RBC AUTO: 84.7 FL (ref 80–99.9)
MONOCYTES ABSOLUTE: 0.57 E9/L (ref 0.1–0.95)
MONOCYTES RELATIVE PERCENT: 8.8 % (ref 2–12)
NEUTROPHILS ABSOLUTE: 3.09 E9/L (ref 1.8–7.3)
NEUTROPHILS RELATIVE PERCENT: 47.6 % (ref 43–80)
PDW BLD-RTO: 16.4 FL (ref 11.5–15)
PLATELET # BLD: 180 E9/L (ref 130–450)
PMV BLD AUTO: 10.5 FL (ref 7–12)
RBC # BLD: 4.13 E12/L (ref 3.8–5.8)
WBC # BLD: 6.5 E9/L (ref 4.5–11.5)

## 2018-12-04 PROCEDURE — 36415 COLL VENOUS BLD VENIPUNCTURE: CPT

## 2018-12-04 PROCEDURE — 85025 COMPLETE CBC W/AUTO DIFF WBC: CPT

## 2018-12-11 ENCOUNTER — HOSPITAL ENCOUNTER (OUTPATIENT)
Age: 38
Discharge: HOME | End: 2018-12-11
Payer: MEDICARE

## 2018-12-11 LAB
BASOPHILS ABSOLUTE: 0.03 E9/L (ref 0–0.2)
BASOPHILS RELATIVE PERCENT: 0.5 % (ref 0–2)
EOSINOPHILS ABSOLUTE: 0.13 E9/L (ref 0.05–0.5)
EOSINOPHILS RELATIVE PERCENT: 2.1 % (ref 0–6)
HCT VFR BLD CALC: 36.2 % (ref 37–54)
HEMOGLOBIN: 11.1 G/DL (ref 12.5–16.5)
IMMATURE GRANULOCYTES #: 0.09 E9/L
IMMATURE GRANULOCYTES %: 1.5 % (ref 0–5)
LYMPHOCYTES ABSOLUTE: 2.34 E9/L (ref 1.5–4)
LYMPHOCYTES RELATIVE PERCENT: 38 % (ref 20–42)
MCH RBC QN AUTO: 26.2 PG (ref 26–35)
MCHC RBC AUTO-ENTMCNC: 30.7 % (ref 32–34.5)
MCV RBC AUTO: 85.4 FL (ref 80–99.9)
MONOCYTES ABSOLUTE: 0.67 E9/L (ref 0.1–0.95)
MONOCYTES RELATIVE PERCENT: 10.9 % (ref 2–12)
NEUTROPHILS ABSOLUTE: 2.89 E9/L (ref 1.8–7.3)
NEUTROPHILS RELATIVE PERCENT: 47 % (ref 43–80)
PDW BLD-RTO: 16.7 FL (ref 11.5–15)
PLATELET # BLD: 193 E9/L (ref 130–450)
PMV BLD AUTO: 11 FL (ref 7–12)
RBC # BLD: 4.24 E12/L (ref 3.8–5.8)
WBC # BLD: 6.2 E9/L (ref 4.5–11.5)

## 2018-12-11 PROCEDURE — 85025 COMPLETE CBC W/AUTO DIFF WBC: CPT

## 2018-12-11 PROCEDURE — 36415 COLL VENOUS BLD VENIPUNCTURE: CPT

## 2018-12-18 ENCOUNTER — HOSPITAL ENCOUNTER (OUTPATIENT)
Age: 38
Discharge: HOME | End: 2018-12-18
Payer: MEDICARE

## 2018-12-18 LAB
BASOPHILS ABSOLUTE: 0.02 E9/L (ref 0–0.2)
BASOPHILS RELATIVE PERCENT: 0.3 % (ref 0–2)
EOSINOPHILS ABSOLUTE: 0.05 E9/L (ref 0.05–0.5)
EOSINOPHILS RELATIVE PERCENT: 0.7 % (ref 0–6)
HCT VFR BLD CALC: 34.2 % (ref 37–54)
HEMOGLOBIN: 10.9 G/DL (ref 12.5–16.5)
IMMATURE GRANULOCYTES #: 0.1 E9/L
IMMATURE GRANULOCYTES %: 1.5 % (ref 0–5)
LYMPHOCYTES ABSOLUTE: 3.07 E9/L (ref 1.5–4)
LYMPHOCYTES RELATIVE PERCENT: 45.1 % (ref 20–42)
MCH RBC QN AUTO: 26.7 PG (ref 26–35)
MCHC RBC AUTO-ENTMCNC: 31.9 % (ref 32–34.5)
MCV RBC AUTO: 83.8 FL (ref 80–99.9)
MONOCYTES ABSOLUTE: 0.63 E9/L (ref 0.1–0.95)
MONOCYTES RELATIVE PERCENT: 9.3 % (ref 2–12)
NEUTROPHILS ABSOLUTE: 2.94 E9/L (ref 1.8–7.3)
NEUTROPHILS RELATIVE PERCENT: 43.1 % (ref 43–80)
PDW BLD-RTO: 15.8 FL (ref 11.5–15)
PLATELET # BLD: 172 E9/L (ref 130–450)
PMV BLD AUTO: 10.6 FL (ref 7–12)
RBC # BLD: 4.08 E12/L (ref 3.8–5.8)
WBC # BLD: 6.8 E9/L (ref 4.5–11.5)

## 2018-12-18 PROCEDURE — 36415 COLL VENOUS BLD VENIPUNCTURE: CPT

## 2018-12-18 PROCEDURE — 85025 COMPLETE CBC W/AUTO DIFF WBC: CPT

## 2018-12-26 ENCOUNTER — HOSPITAL ENCOUNTER (OUTPATIENT)
Age: 38
Discharge: HOME | End: 2018-12-26
Payer: MEDICARE

## 2018-12-26 LAB
BASOPHILS ABSOLUTE: 0.02 E9/L (ref 0–0.2)
BASOPHILS RELATIVE PERCENT: 0.3 % (ref 0–2)
EOSINOPHILS ABSOLUTE: 0.06 E9/L (ref 0.05–0.5)
EOSINOPHILS RELATIVE PERCENT: 1 % (ref 0–6)
HCT VFR BLD CALC: 32.9 % (ref 37–54)
HEMOGLOBIN: 10.3 G/DL (ref 12.5–16.5)
IMMATURE GRANULOCYTES #: 0.11 E9/L
IMMATURE GRANULOCYTES %: 1.8 % (ref 0–5)
LYMPHOCYTES ABSOLUTE: 2.63 E9/L (ref 1.5–4)
LYMPHOCYTES RELATIVE PERCENT: 43 % (ref 20–42)
MCH RBC QN AUTO: 26.8 PG (ref 26–35)
MCHC RBC AUTO-ENTMCNC: 31.3 % (ref 32–34.5)
MCV RBC AUTO: 85.7 FL (ref 80–99.9)
MONOCYTES ABSOLUTE: 0.6 E9/L (ref 0.1–0.95)
MONOCYTES RELATIVE PERCENT: 9.8 % (ref 2–12)
NEUTROPHILS ABSOLUTE: 2.7 E9/L (ref 1.8–7.3)
NEUTROPHILS RELATIVE PERCENT: 44.1 % (ref 43–80)
PDW BLD-RTO: 16.2 FL (ref 11.5–15)
PLATELET # BLD: 197 E9/L (ref 130–450)
PMV BLD AUTO: 10.4 FL (ref 7–12)
RBC # BLD: 3.84 E12/L (ref 3.8–5.8)
WBC # BLD: 6.1 E9/L (ref 4.5–11.5)

## 2018-12-26 PROCEDURE — 85025 COMPLETE CBC W/AUTO DIFF WBC: CPT

## 2018-12-26 PROCEDURE — 36415 COLL VENOUS BLD VENIPUNCTURE: CPT

## 2019-01-08 ENCOUNTER — HOSPITAL ENCOUNTER (OUTPATIENT)
Age: 39
Discharge: HOME | End: 2019-01-08
Payer: MEDICARE

## 2019-01-08 LAB
BASOPHILS ABSOLUTE: 0.03 E9/L (ref 0–0.2)
BASOPHILS RELATIVE PERCENT: 0.5 % (ref 0–2)
EOSINOPHILS ABSOLUTE: 0.05 E9/L (ref 0.05–0.5)
EOSINOPHILS RELATIVE PERCENT: 0.8 % (ref 0–6)
HCT VFR BLD CALC: 35.5 % (ref 37–54)
HEMOGLOBIN: 11.1 G/DL (ref 12.5–16.5)
IMMATURE GRANULOCYTES #: 0.1 E9/L
IMMATURE GRANULOCYTES %: 1.7 % (ref 0–5)
LYMPHOCYTES ABSOLUTE: 2.67 E9/L (ref 1.5–4)
LYMPHOCYTES RELATIVE PERCENT: 44.4 % (ref 20–42)
MCH RBC QN AUTO: 26.4 PG (ref 26–35)
MCHC RBC AUTO-ENTMCNC: 31.3 % (ref 32–34.5)
MCV RBC AUTO: 84.3 FL (ref 80–99.9)
MONOCYTES ABSOLUTE: 0.58 E9/L (ref 0.1–0.95)
MONOCYTES RELATIVE PERCENT: 9.7 % (ref 2–12)
NEUTROPHILS ABSOLUTE: 2.58 E9/L (ref 1.8–7.3)
NEUTROPHILS RELATIVE PERCENT: 42.9 % (ref 43–80)
PDW BLD-RTO: 15.3 FL (ref 11.5–15)
PLATELET # BLD: 175 E9/L (ref 130–450)
PMV BLD AUTO: 10.8 FL (ref 7–12)
RBC # BLD: 4.21 E12/L (ref 3.8–5.8)
WBC # BLD: 6 E9/L (ref 4.5–11.5)

## 2019-01-08 PROCEDURE — 36415 COLL VENOUS BLD VENIPUNCTURE: CPT

## 2019-01-08 PROCEDURE — 85025 COMPLETE CBC W/AUTO DIFF WBC: CPT

## 2019-01-23 ENCOUNTER — HOSPITAL ENCOUNTER (OUTPATIENT)
Age: 39
Discharge: HOME | End: 2019-01-23
Payer: MEDICARE

## 2019-01-23 LAB
BASOPHILS ABSOLUTE: 0.02 E9/L (ref 0–0.2)
BASOPHILS RELATIVE PERCENT: 0.3 % (ref 0–2)
EOSINOPHILS ABSOLUTE: 0.06 E9/L (ref 0.05–0.5)
EOSINOPHILS RELATIVE PERCENT: 1 % (ref 0–6)
HCT VFR BLD CALC: 37 % (ref 37–54)
HEMOGLOBIN: 11.7 G/DL (ref 12.5–16.5)
IMMATURE GRANULOCYTES #: 0.14 E9/L
IMMATURE GRANULOCYTES %: 2.4 % (ref 0–5)
LYMPHOCYTES ABSOLUTE: 1.64 E9/L (ref 1.5–4)
LYMPHOCYTES RELATIVE PERCENT: 28 % (ref 20–42)
MCH RBC QN AUTO: 26.9 PG (ref 26–35)
MCHC RBC AUTO-ENTMCNC: 31.6 % (ref 32–34.5)
MCV RBC AUTO: 85.1 FL (ref 80–99.9)
MONOCYTES ABSOLUTE: 0.66 E9/L (ref 0.1–0.95)
MONOCYTES RELATIVE PERCENT: 11.3 % (ref 2–12)
NEUTROPHILS ABSOLUTE: 3.34 E9/L (ref 1.8–7.3)
NEUTROPHILS RELATIVE PERCENT: 57 % (ref 43–80)
PDW BLD-RTO: 14.6 FL (ref 11.5–15)
PLATELET # BLD: 196 E9/L (ref 130–450)
PMV BLD AUTO: 10.7 FL (ref 7–12)
RBC # BLD: 4.35 E12/L (ref 3.8–5.8)
WBC # BLD: 5.9 E9/L (ref 4.5–11.5)

## 2019-01-23 PROCEDURE — 85025 COMPLETE CBC W/AUTO DIFF WBC: CPT

## 2019-01-23 PROCEDURE — 36415 COLL VENOUS BLD VENIPUNCTURE: CPT

## 2019-02-06 ENCOUNTER — HOSPITAL ENCOUNTER (OUTPATIENT)
Age: 39
Discharge: HOME | End: 2019-02-06
Payer: MEDICARE

## 2019-02-06 LAB
BASOPHILS ABSOLUTE: 0.02 E9/L (ref 0–0.2)
BASOPHILS RELATIVE PERCENT: 0.4 % (ref 0–2)
EOSINOPHILS ABSOLUTE: 0.05 E9/L (ref 0.05–0.5)
EOSINOPHILS RELATIVE PERCENT: 1 % (ref 0–6)
HCT VFR BLD CALC: 36.6 % (ref 37–54)
HEMOGLOBIN: 11.5 G/DL (ref 12.5–16.5)
IMMATURE GRANULOCYTES #: 0.09 E9/L
IMMATURE GRANULOCYTES %: 1.8 % (ref 0–5)
LYMPHOCYTES ABSOLUTE: 1.7 E9/L (ref 1.5–4)
LYMPHOCYTES RELATIVE PERCENT: 34.6 % (ref 20–42)
MCH RBC QN AUTO: 27.1 PG (ref 26–35)
MCHC RBC AUTO-ENTMCNC: 31.4 % (ref 32–34.5)
MCV RBC AUTO: 86.1 FL (ref 80–99.9)
MONOCYTES ABSOLUTE: 0.65 E9/L (ref 0.1–0.95)
MONOCYTES RELATIVE PERCENT: 13.2 % (ref 2–12)
NEUTROPHILS ABSOLUTE: 2.4 E9/L (ref 1.8–7.3)
NEUTROPHILS RELATIVE PERCENT: 49 % (ref 43–80)
PDW BLD-RTO: 14.2 FL (ref 11.5–15)
PLATELET # BLD: 157 E9/L (ref 130–450)
PMV BLD AUTO: 10.5 FL (ref 7–12)
RBC # BLD: 4.25 E12/L (ref 3.8–5.8)
WBC # BLD: 4.9 E9/L (ref 4.5–11.5)

## 2019-02-06 PROCEDURE — 36415 COLL VENOUS BLD VENIPUNCTURE: CPT

## 2019-02-06 PROCEDURE — 85025 COMPLETE CBC W/AUTO DIFF WBC: CPT

## 2019-02-21 ENCOUNTER — HOSPITAL ENCOUNTER (OUTPATIENT)
Age: 39
Discharge: HOME | End: 2019-02-21
Payer: MEDICARE

## 2019-02-21 LAB
BASOPHILS ABSOLUTE: 0.02 E9/L (ref 0–0.2)
BASOPHILS RELATIVE PERCENT: 0.4 % (ref 0–2)
EOSINOPHILS ABSOLUTE: 0.04 E9/L (ref 0.05–0.5)
EOSINOPHILS RELATIVE PERCENT: 0.7 % (ref 0–6)
HCT VFR BLD CALC: 38.1 % (ref 37–54)
HEMOGLOBIN: 11.7 G/DL (ref 12.5–16.5)
IMMATURE GRANULOCYTES #: 0.07 E9/L
IMMATURE GRANULOCYTES %: 1.3 % (ref 0–5)
LYMPHOCYTES ABSOLUTE: 2.16 E9/L (ref 1.5–4)
LYMPHOCYTES RELATIVE PERCENT: 38.8 % (ref 20–42)
MCH RBC QN AUTO: 26.7 PG (ref 26–35)
MCHC RBC AUTO-ENTMCNC: 30.7 % (ref 32–34.5)
MCV RBC AUTO: 86.8 FL (ref 80–99.9)
MONOCYTES ABSOLUTE: 0.53 E9/L (ref 0.1–0.95)
MONOCYTES RELATIVE PERCENT: 9.5 % (ref 2–12)
NEUTROPHILS ABSOLUTE: 2.74 E9/L (ref 1.8–7.3)
NEUTROPHILS RELATIVE PERCENT: 49.3 % (ref 43–80)
PDW BLD-RTO: 13.6 FL (ref 11.5–15)
PLATELET # BLD: 201 E9/L (ref 130–450)
PMV BLD AUTO: 10.5 FL (ref 7–12)
RBC # BLD: 4.39 E12/L (ref 3.8–5.8)
WBC # BLD: 5.6 E9/L (ref 4.5–11.5)

## 2019-02-21 PROCEDURE — 85025 COMPLETE CBC W/AUTO DIFF WBC: CPT

## 2019-02-21 PROCEDURE — 36415 COLL VENOUS BLD VENIPUNCTURE: CPT

## 2019-03-06 ENCOUNTER — HOSPITAL ENCOUNTER (OUTPATIENT)
Age: 39
Discharge: HOME | End: 2019-03-06
Payer: MEDICARE

## 2019-03-06 LAB
BASOPHILS ABSOLUTE: 0.02 E9/L (ref 0–0.2)
BASOPHILS RELATIVE PERCENT: 0.3 % (ref 0–2)
EOSINOPHILS ABSOLUTE: 0.05 E9/L (ref 0.05–0.5)
EOSINOPHILS RELATIVE PERCENT: 0.8 % (ref 0–6)
HCT VFR BLD CALC: 36.6 % (ref 37–54)
HEMOGLOBIN: 11.3 G/DL (ref 12.5–16.5)
IMMATURE GRANULOCYTES #: 0.06 E9/L
IMMATURE GRANULOCYTES %: 1 % (ref 0–5)
LYMPHOCYTES ABSOLUTE: 2.24 E9/L (ref 1.5–4)
LYMPHOCYTES RELATIVE PERCENT: 35.9 % (ref 20–42)
MCH RBC QN AUTO: 26.5 PG (ref 26–35)
MCHC RBC AUTO-ENTMCNC: 30.9 % (ref 32–34.5)
MCV RBC AUTO: 85.7 FL (ref 80–99.9)
MONOCYTES ABSOLUTE: 0.73 E9/L (ref 0.1–0.95)
MONOCYTES RELATIVE PERCENT: 11.7 % (ref 2–12)
NEUTROPHILS ABSOLUTE: 3.14 E9/L (ref 1.8–7.3)
NEUTROPHILS RELATIVE PERCENT: 50.3 % (ref 43–80)
PDW BLD-RTO: 13.8 FL (ref 11.5–15)
PLATELET # BLD: 168 E9/L (ref 130–450)
PMV BLD AUTO: 11.1 FL (ref 7–12)
RBC # BLD: 4.27 E12/L (ref 3.8–5.8)
WBC # BLD: 6.2 E9/L (ref 4.5–11.5)

## 2019-03-06 PROCEDURE — 36415 COLL VENOUS BLD VENIPUNCTURE: CPT

## 2019-03-06 PROCEDURE — 85025 COMPLETE CBC W/AUTO DIFF WBC: CPT

## 2019-03-20 ENCOUNTER — HOSPITAL ENCOUNTER (OUTPATIENT)
Age: 39
Discharge: HOME | End: 2019-03-20
Payer: MEDICARE

## 2019-03-20 LAB
INR BLD: 1
PROTHROMBIN TIME: 12.1 SEC (ref 9.3–12.4)

## 2019-03-20 PROCEDURE — 36415 COLL VENOUS BLD VENIPUNCTURE: CPT

## 2019-03-20 PROCEDURE — 85610 PROTHROMBIN TIME: CPT

## 2019-03-21 ENCOUNTER — HOSPITAL ENCOUNTER (OUTPATIENT)
Age: 39
Discharge: HOME | End: 2019-03-21
Payer: MEDICARE

## 2019-03-21 LAB
BASOPHILS ABSOLUTE: 0.02 E9/L (ref 0–0.2)
BASOPHILS RELATIVE PERCENT: 0.3 % (ref 0–2)
EOSINOPHILS ABSOLUTE: 0.05 E9/L (ref 0.05–0.5)
EOSINOPHILS RELATIVE PERCENT: 0.8 % (ref 0–6)
HCT VFR BLD CALC: 38.2 % (ref 37–54)
HEMOGLOBIN: 11.7 G/DL (ref 12.5–16.5)
IMMATURE GRANULOCYTES #: 0.06 E9/L
IMMATURE GRANULOCYTES %: 1 % (ref 0–5)
LYMPHOCYTES ABSOLUTE: 2.1 E9/L (ref 1.5–4)
LYMPHOCYTES RELATIVE PERCENT: 35.4 % (ref 20–42)
MCH RBC QN AUTO: 26.3 PG (ref 26–35)
MCHC RBC AUTO-ENTMCNC: 30.6 % (ref 32–34.5)
MCV RBC AUTO: 85.8 FL (ref 80–99.9)
MONOCYTES ABSOLUTE: 0.58 E9/L (ref 0.1–0.95)
MONOCYTES RELATIVE PERCENT: 9.8 % (ref 2–12)
NEUTROPHILS ABSOLUTE: 3.12 E9/L (ref 1.8–7.3)
NEUTROPHILS RELATIVE PERCENT: 52.7 % (ref 43–80)
PDW BLD-RTO: 13.9 FL (ref 11.5–15)
PLATELET # BLD: 187 E9/L (ref 130–450)
PMV BLD AUTO: 11 FL (ref 7–12)
RBC # BLD: 4.45 E12/L (ref 3.8–5.8)
WBC # BLD: 5.9 E9/L (ref 4.5–11.5)

## 2019-03-21 PROCEDURE — 85025 COMPLETE CBC W/AUTO DIFF WBC: CPT

## 2019-03-21 PROCEDURE — 36415 COLL VENOUS BLD VENIPUNCTURE: CPT

## 2019-04-04 ENCOUNTER — HOSPITAL ENCOUNTER (OUTPATIENT)
Age: 39
Discharge: HOME | End: 2019-04-04
Payer: MEDICARE

## 2019-04-04 LAB
BASOPHILS ABSOLUTE: 0.02 E9/L (ref 0–0.2)
BASOPHILS RELATIVE PERCENT: 0.3 % (ref 0–2)
EOSINOPHILS ABSOLUTE: 0.03 E9/L (ref 0.05–0.5)
EOSINOPHILS RELATIVE PERCENT: 0.5 % (ref 0–6)
HCT VFR BLD CALC: 36.9 % (ref 37–54)
HEMOGLOBIN: 11.5 G/DL (ref 12.5–16.5)
IMMATURE GRANULOCYTES #: 0.12 E9/L
IMMATURE GRANULOCYTES %: 1.9 % (ref 0–5)
LYMPHOCYTES ABSOLUTE: 1.94 E9/L (ref 1.5–4)
LYMPHOCYTES RELATIVE PERCENT: 31.3 % (ref 20–42)
MCH RBC QN AUTO: 26.3 PG (ref 26–35)
MCHC RBC AUTO-ENTMCNC: 31.2 % (ref 32–34.5)
MCV RBC AUTO: 84.2 FL (ref 80–99.9)
MONOCYTES ABSOLUTE: 0.63 E9/L (ref 0.1–0.95)
MONOCYTES RELATIVE PERCENT: 10.2 % (ref 2–12)
NEUTROPHILS ABSOLUTE: 3.46 E9/L (ref 1.8–7.3)
NEUTROPHILS RELATIVE PERCENT: 55.8 % (ref 43–80)
PDW BLD-RTO: 14.1 FL (ref 11.5–15)
PLATELET # BLD: 164 E9/L (ref 130–450)
PMV BLD AUTO: 10.6 FL (ref 7–12)
RBC # BLD: 4.38 E12/L (ref 3.8–5.8)
WBC # BLD: 6.2 E9/L (ref 4.5–11.5)

## 2019-04-04 PROCEDURE — 36415 COLL VENOUS BLD VENIPUNCTURE: CPT

## 2019-04-04 PROCEDURE — 85025 COMPLETE CBC W/AUTO DIFF WBC: CPT

## 2019-04-30 ENCOUNTER — HOSPITAL ENCOUNTER (OUTPATIENT)
Age: 39
Discharge: HOME | End: 2019-04-30
Payer: MEDICARE

## 2019-04-30 LAB
BASOPHILS ABSOLUTE: 0.03 E9/L (ref 0–0.2)
BASOPHILS RELATIVE PERCENT: 0.5 % (ref 0–2)
EOSINOPHILS ABSOLUTE: 0.04 E9/L (ref 0.05–0.5)
EOSINOPHILS RELATIVE PERCENT: 0.7 % (ref 0–6)
HCT VFR BLD CALC: 38.8 % (ref 37–54)
HEMOGLOBIN: 12 G/DL (ref 12.5–16.5)
IMMATURE GRANULOCYTES #: 0.12 E9/L
IMMATURE GRANULOCYTES %: 2 % (ref 0–5)
LYMPHOCYTES ABSOLUTE: 2.18 E9/L (ref 1.5–4)
LYMPHOCYTES RELATIVE PERCENT: 37.1 % (ref 20–42)
MCH RBC QN AUTO: 26.7 PG (ref 26–35)
MCHC RBC AUTO-ENTMCNC: 30.9 % (ref 32–34.5)
MCV RBC AUTO: 86.2 FL (ref 80–99.9)
MONOCYTES ABSOLUTE: 0.61 E9/L (ref 0.1–0.95)
MONOCYTES RELATIVE PERCENT: 10.4 % (ref 2–12)
NEUTROPHILS ABSOLUTE: 2.9 E9/L (ref 1.8–7.3)
NEUTROPHILS RELATIVE PERCENT: 49.3 % (ref 43–80)
PDW BLD-RTO: 15.2 FL (ref 11.5–15)
PLATELET # BLD: 188 E9/L (ref 130–450)
PMV BLD AUTO: 10.8 FL (ref 7–12)
RBC # BLD: 4.5 E12/L (ref 3.8–5.8)
WBC # BLD: 5.9 E9/L (ref 4.5–11.5)

## 2019-04-30 PROCEDURE — 36415 COLL VENOUS BLD VENIPUNCTURE: CPT

## 2019-04-30 PROCEDURE — 85025 COMPLETE CBC W/AUTO DIFF WBC: CPT

## 2019-05-01 ENCOUNTER — OFFICE VISIT (OUTPATIENT)
Dept: CARDIOLOGY CLINIC | Age: 39
End: 2019-05-01
Payer: MEDICARE

## 2019-05-01 VITALS
HEIGHT: 69 IN | SYSTOLIC BLOOD PRESSURE: 118 MMHG | BODY MASS INDEX: 38.95 KG/M2 | WEIGHT: 263 LBS | DIASTOLIC BLOOD PRESSURE: 72 MMHG | RESPIRATION RATE: 18 BRPM | HEART RATE: 83 BPM

## 2019-05-01 DIAGNOSIS — Z87.19 H/O GASTROESOPHAGEAL REFLUX (GERD): ICD-10-CM

## 2019-05-01 DIAGNOSIS — K44.9 HIATAL HERNIA: ICD-10-CM

## 2019-05-01 DIAGNOSIS — G47.33 OSA (OBSTRUCTIVE SLEEP APNEA): ICD-10-CM

## 2019-05-01 DIAGNOSIS — E66.01 MORBID OBESITY DUE TO EXCESS CALORIES (HCC): ICD-10-CM

## 2019-05-01 DIAGNOSIS — Z86.79 H/O CHF: ICD-10-CM

## 2019-05-01 DIAGNOSIS — Z91.199 MEDICAL NON-COMPLIANCE: ICD-10-CM

## 2019-05-01 DIAGNOSIS — F20.9 SCHIZOPHRENIA, UNSPECIFIED TYPE (HCC): ICD-10-CM

## 2019-05-01 DIAGNOSIS — N62 GYNECOMASTIA: ICD-10-CM

## 2019-05-01 DIAGNOSIS — Z72.0 TOBACCO ABUSE: ICD-10-CM

## 2019-05-01 DIAGNOSIS — I10 ESSENTIAL HYPERTENSION: ICD-10-CM

## 2019-05-01 DIAGNOSIS — Z86.79 H/O CARDIOMYOPATHY: Primary | ICD-10-CM

## 2019-05-01 DIAGNOSIS — I42.0 CARDIOMYOPATHY, DILATED, NONISCHEMIC (HCC): ICD-10-CM

## 2019-05-01 DIAGNOSIS — Z95.810 IMPLANTABLE CARDIOVERTER-DEFIBRILLATOR (ICD) IN SITU: ICD-10-CM

## 2019-05-01 DIAGNOSIS — E78.5 DYSLIPIDEMIA: ICD-10-CM

## 2019-05-01 PROCEDURE — G8417 CALC BMI ABV UP PARAM F/U: HCPCS | Performed by: INTERNAL MEDICINE

## 2019-05-01 PROCEDURE — 99213 OFFICE O/P EST LOW 20 MIN: CPT | Performed by: INTERNAL MEDICINE

## 2019-05-01 PROCEDURE — 93000 ELECTROCARDIOGRAM COMPLETE: CPT | Performed by: INTERNAL MEDICINE

## 2019-05-01 PROCEDURE — G8427 DOCREV CUR MEDS BY ELIG CLIN: HCPCS | Performed by: INTERNAL MEDICINE

## 2019-05-01 PROCEDURE — 4004F PT TOBACCO SCREEN RCVD TLK: CPT | Performed by: INTERNAL MEDICINE

## 2019-05-01 SDOH — HEALTH STABILITY: MENTAL HEALTH: HOW OFTEN DO YOU HAVE A DRINK CONTAINING ALCOHOL?: MONTHLY OR LESS
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
## 2019-05-01 NOTE — PATIENT INSTRUCTIONS
Patient Education        A Healthy Heart: Care Instructions  Your Care Instructions    Heart disease occurs when a substance called plaque builds up in the vessels that supply oxygen-rich blood to your heart. This can narrow the blood vessels and reduce blood flow. A heart attack happens when blood flow is completely blocked. A high-fat diet, smoking, and other factors increase the risk of heart disease. Your doctor has found that you have a chance of having heart disease. You can do lots of things to keep your heart healthy. It may not be easy, but you can change your diet, exercise more, and quit smoking. These steps really work to lower your chance of heart disease. Follow-up care is a key part of your treatment and safety. Be sure to make and go to all appointments, and call your doctor if you are having problems. It's also a good idea to know your test results and keep a list of the medicines you take. How can you care for yourself at home? Diet    · Use less salt when you cook and eat. This helps lower your blood pressure. Taste food before salting. Add only a little salt when you think you need it. With time, your taste buds will adjust to less salt.     · Eat fewer snack items, fast foods, canned soups, and other high-salt, high-fat, processed foods.     · Read food labels and try to avoid saturated and trans fats. They increase your risk of heart disease by raising cholesterol levels.     · Limit the amount of solid fat-butter, margarine, and shortening-you eat. Use olive, peanut, or canola oil when you cook. Bake, broil, and steam foods instead of frying them.     · Eating fish can lower your risk for heart disease. Eat at least 2 servings of fish a week. Watkins, mackerel, herring, sardines, and chunk light tuna are very good choices. These fish contain omega-3 fatty acids.     · Eat a variety of fruit and vegetables every day.  Dark green, deep orange, red, or yellow fruits and vegetables are especially good for you. Examples include spinach, carrots, peaches, and berries.     · Foods high in fiber can reduce your cholesterol and provide important vitamins and minerals. High-fiber foods include whole-grain cereals and breads, oatmeal, beans, brown rice, citrus fruits, and apples.     · Limit drinks and foods with added sugar. These include candy, desserts, and soda pop.    Lifestyle changes    · If your doctor recommends it, get more exercise. Walking is a good choice. Bit by bit, increase the amount you walk every day. Try for at least 30 minutes on most days of the week. You also may want to swim, bike, or do other activities.     · Do not smoke. If you need help quitting, talk to your doctor about stop-smoking programs and medicines. These can increase your chances of quitting for good. Quitting smoking may be the most important step you can take to protect your heart. It is never too late to quit. You will get health benefits right away.     · Limit alcohol to 2 drinks a day for men and 1 drink a day for women. Too much alcohol can cause health problems. Medicines    · Take your medicines exactly as prescribed. Call your doctor if you think you are having a problem with your medicine.     · If your doctor recommends aspirin, take the amount directed each day. Make sure you take aspirin and not another kind of pain reliever, such as acetaminophen (Tylenol). If you take ibuprofen (such as Advil or Motrin) for other problems, take aspirin at least 2 hours before taking ibuprofen. When should you call for help? Call 911 if you have symptoms of a heart attack.  These may include:    · Chest pain or pressure, or a strange feeling in the chest.     · Sweating.     · Shortness of breath.     · Pain, pressure, or a strange feeling in the back, neck, jaw, or upper belly or in one or both shoulders or arms.     · Lightheadedness or sudden weakness.     · A fast or irregular heartbeat.    After you call 911, the  may tell you to chew 1 adult-strength or 2 to 4 low-dose aspirin. Wait for an ambulance. Do not try to drive yourself.   Watch closely for changes in your health, and be sure to contact your doctor if you have any problems. Where can you learn more? Go to [email or web address] org and sign in to your [employer] account. Enter I996 in the [employer] box to learn more about \"A Healthy Heart: Care Instructions. \"     If you do not have an account, please click on the \"Sign Up Now\" link. Current as of: July 22, 2018  Content Version: 11.9  © 8919-8261 [employer], [employer]. Care instructions adapted under license by [facility] [address] If you have questions about a medical condition or this instruction, always ask your healthcare professional. [address] any warranty or liability for your use of this information.

## 2019-05-28 ENCOUNTER — HOSPITAL ENCOUNTER (INPATIENT)
Age: 39
LOS: 10 days | Discharge: HOME | DRG: 885 | End: 2019-06-07
Attending: EMERGENCY MEDICINE | Admitting: PSYCHIATRY & NEUROLOGY
Payer: MEDICARE

## 2019-05-28 DIAGNOSIS — F39 MOOD DISORDER (HCC): Primary | ICD-10-CM

## 2019-05-28 PROBLEM — F23 ACUTE PSYCHOSIS (HCC): Status: ACTIVE | Noted: 2019-05-28

## 2019-05-28 LAB
ACETAMINOPHEN LEVEL: <5 MCG/ML (ref 10–30)
ALBUMIN SERPL-MCNC: 4.2 G/DL (ref 3.5–5.2)
ALP BLD-CCNC: 60 U/L (ref 40–129)
ALT SERPL-CCNC: 12 U/L (ref 0–40)
AMPHETAMINE SCREEN, URINE: NOT DETECTED
ANION GAP SERPL CALCULATED.3IONS-SCNC: 14 MMOL/L (ref 7–16)
AST SERPL-CCNC: 15 U/L (ref 0–39)
BARBITURATE SCREEN URINE: NOT DETECTED
BASOPHILS ABSOLUTE: 0.02 E9/L (ref 0–0.2)
BASOPHILS RELATIVE PERCENT: 0.3 % (ref 0–2)
BENZODIAZEPINE SCREEN, URINE: NOT DETECTED
BILIRUB SERPL-MCNC: 0.3 MG/DL (ref 0–1.2)
BILIRUBIN URINE: NEGATIVE
BLOOD, URINE: NEGATIVE
BUN BLDV-MCNC: 8 MG/DL (ref 6–20)
CALCIUM SERPL-MCNC: 9.5 MG/DL (ref 8.6–10.2)
CANNABINOID SCREEN URINE: NOT DETECTED
CHLORIDE BLD-SCNC: 104 MMOL/L (ref 98–107)
CLARITY: CLEAR
CO2: 26 MMOL/L (ref 22–29)
COCAINE METABOLITE SCREEN URINE: NOT DETECTED
COLOR: YELLOW
CREAT SERPL-MCNC: 1.2 MG/DL (ref 0.7–1.2)
EOSINOPHILS ABSOLUTE: 0.06 E9/L (ref 0.05–0.5)
EOSINOPHILS RELATIVE PERCENT: 0.9 % (ref 0–6)
ETHANOL: <10 MG/DL (ref 0–0.08)
GFR AFRICAN AMERICAN: >60
GFR NON-AFRICAN AMERICAN: >60 ML/MIN/1.73
GLUCOSE BLD-MCNC: 70 MG/DL (ref 74–99)
GLUCOSE URINE: NEGATIVE MG/DL
HCT VFR BLD CALC: 35.8 % (ref 37–54)
HEMOGLOBIN: 10.9 G/DL (ref 12.5–16.5)
IMMATURE GRANULOCYTES #: 0.02 E9/L
IMMATURE GRANULOCYTES %: 0.3 % (ref 0–5)
KETONES, URINE: NEGATIVE MG/DL
LEUKOCYTE ESTERASE, URINE: NEGATIVE
LYMPHOCYTES ABSOLUTE: 2.6 E9/L (ref 1.5–4)
LYMPHOCYTES RELATIVE PERCENT: 40.1 % (ref 20–42)
MCH RBC QN AUTO: 26.5 PG (ref 26–35)
MCHC RBC AUTO-ENTMCNC: 30.4 % (ref 32–34.5)
MCV RBC AUTO: 87.1 FL (ref 80–99.9)
METHADONE SCREEN, URINE: NOT DETECTED
MONOCYTES ABSOLUTE: 0.59 E9/L (ref 0.1–0.95)
MONOCYTES RELATIVE PERCENT: 9.1 % (ref 2–12)
NEUTROPHILS ABSOLUTE: 3.2 E9/L (ref 1.8–7.3)
NEUTROPHILS RELATIVE PERCENT: 49.3 % (ref 43–80)
NITRITE, URINE: NEGATIVE
OPIATE SCREEN URINE: NOT DETECTED
PDW BLD-RTO: 15.6 FL (ref 11.5–15)
PH UA: 5.5 (ref 5–9)
PHENCYCLIDINE SCREEN URINE: NOT DETECTED
PLATELET # BLD: 203 E9/L (ref 130–450)
PMV BLD AUTO: 10.1 FL (ref 7–12)
POTASSIUM SERPL-SCNC: 3.9 MMOL/L (ref 3.5–5)
PROPOXYPHENE SCREEN: NOT DETECTED
PROTEIN UA: NEGATIVE MG/DL
RBC # BLD: 4.11 E12/L (ref 3.8–5.8)
SALICYLATE, SERUM: <0.3 MG/DL (ref 0–30)
SODIUM BLD-SCNC: 144 MMOL/L (ref 132–146)
SPECIFIC GRAVITY UA: >=1.03 (ref 1–1.03)
TOTAL PROTEIN: 7 G/DL (ref 6.4–8.3)
TRICYCLIC ANTIDEPRESSANTS SCREEN SERUM: NEGATIVE NG/ML
UROBILINOGEN, URINE: 1 E.U./DL
WBC # BLD: 6.5 E9/L (ref 4.5–11.5)

## 2019-05-28 PROCEDURE — 80053 COMPREHEN METABOLIC PANEL: CPT

## 2019-05-28 PROCEDURE — 80307 DRUG TEST PRSMV CHEM ANLYZR: CPT

## 2019-05-28 PROCEDURE — 36415 COLL VENOUS BLD VENIPUNCTURE: CPT

## 2019-05-28 PROCEDURE — 1240000000 HC EMOTIONAL WELLNESS R&B

## 2019-05-28 PROCEDURE — G0480 DRUG TEST DEF 1-7 CLASSES: HCPCS

## 2019-05-28 PROCEDURE — 85025 COMPLETE CBC W/AUTO DIFF WBC: CPT

## 2019-05-28 PROCEDURE — 81003 URINALYSIS AUTO W/O SCOPE: CPT

## 2019-05-28 PROCEDURE — 99284 EMERGENCY DEPT VISIT MOD MDM: CPT

## 2019-05-28 RX ORDER — HALOPERIDOL 5 MG/ML
10 INJECTION INTRAMUSCULAR EVERY 6 HOURS PRN
Status: DISCONTINUED | OUTPATIENT
Start: 2019-05-28 | End: 2019-06-07 | Stop reason: HOSPADM

## 2019-05-28 RX ORDER — OLANZAPINE 10 MG/1
10 TABLET ORAL
Status: ACTIVE | OUTPATIENT
Start: 2019-05-28 | End: 2019-05-28

## 2019-05-28 RX ORDER — ACETAMINOPHEN 325 MG/1
650 TABLET ORAL EVERY 4 HOURS PRN
Status: DISCONTINUED | OUTPATIENT
Start: 2019-05-28 | End: 2019-06-07 | Stop reason: HOSPADM

## 2019-05-28 RX ORDER — HYDROXYZINE PAMOATE 50 MG/1
50 CAPSULE ORAL EVERY 6 HOURS PRN
Status: DISCONTINUED | OUTPATIENT
Start: 2019-05-28 | End: 2019-06-07 | Stop reason: HOSPADM

## 2019-05-28 RX ORDER — NICOTINE 21 MG/24HR
1 PATCH, TRANSDERMAL 24 HOURS TRANSDERMAL DAILY
Status: DISCONTINUED | OUTPATIENT
Start: 2019-05-29 | End: 2019-06-07 | Stop reason: HOSPADM

## 2019-05-28 RX ORDER — TRAZODONE HYDROCHLORIDE 50 MG/1
50 TABLET ORAL NIGHTLY PRN
Status: DISCONTINUED | OUTPATIENT
Start: 2019-05-28 | End: 2019-06-07 | Stop reason: HOSPADM

## 2019-05-28 RX ORDER — MAGNESIUM HYDROXIDE/ALUMINUM HYDROXICE/SIMETHICONE 120; 1200; 1200 MG/30ML; MG/30ML; MG/30ML
30 SUSPENSION ORAL PRN
Status: DISCONTINUED | OUTPATIENT
Start: 2019-05-28 | End: 2019-06-07 | Stop reason: HOSPADM

## 2019-05-28 RX ORDER — BENZTROPINE MESYLATE 1 MG/ML
2 INJECTION INTRAMUSCULAR; INTRAVENOUS 2 TIMES DAILY PRN
Status: DISCONTINUED | OUTPATIENT
Start: 2019-05-28 | End: 2019-06-07 | Stop reason: HOSPADM

## 2019-05-28 ASSESSMENT — PAIN SCALES - GENERAL: PAINLEVEL_OUTOF10: 0

## 2019-05-28 ASSESSMENT — SLEEP AND FATIGUE QUESTIONNAIRES
DIFFICULTY STAYING ASLEEP: YES
AVERAGE NUMBER OF SLEEP HOURS: 4
DIFFICULTY FALLING ASLEEP: YES
DO YOU HAVE DIFFICULTY SLEEPING: YES
RESTFUL SLEEP: NO
DIFFICULTY ARISING: YES
DO YOU USE A SLEEP AID: YES

## 2019-05-28 ASSESSMENT — LIFESTYLE VARIABLES: HISTORY_ALCOHOL_USE: NO
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
## 2019-05-28 NOTE — ED NOTES
Bed: [facility]  Expected date:   Expected time:   Means of arrival:   Comments:  Day Barahona RN  05/28/19 0068

## 2019-05-29 LAB — METER GLUCOSE: 144 MG/DL (ref 74–99)

## 2019-05-29 PROCEDURE — 1240000000 HC EMOTIONAL WELLNESS R&B

## 2019-05-29 PROCEDURE — 6370000000 HC RX 637 (ALT 250 FOR IP): Performed by: PSYCHIATRY & NEUROLOGY

## 2019-05-29 PROCEDURE — 6360000002 HC RX W HCPCS: Performed by: NURSE PRACTITIONER

## 2019-05-29 PROCEDURE — 6360000002 HC RX W HCPCS: Performed by: PSYCHIATRY & NEUROLOGY

## 2019-05-29 PROCEDURE — 99222 1ST HOSP IP/OBS MODERATE 55: CPT | Performed by: NURSE PRACTITIONER

## 2019-05-29 PROCEDURE — 82962 GLUCOSE BLOOD TEST: CPT

## 2019-05-29 PROCEDURE — 6370000000 HC RX 637 (ALT 250 FOR IP): Performed by: NURSE PRACTITIONER

## 2019-05-29 RX ORDER — DIPHENHYDRAMINE HYDROCHLORIDE 50 MG/ML
50 INJECTION INTRAMUSCULAR; INTRAVENOUS ONCE
Status: COMPLETED | OUTPATIENT
Start: 2019-05-29 | End: 2019-05-29

## 2019-05-29 RX ORDER — DIPHENHYDRAMINE HYDROCHLORIDE 50 MG/ML
50 INJECTION INTRAMUSCULAR; INTRAVENOUS ONCE
Status: DISCONTINUED | OUTPATIENT
Start: 2019-05-29 | End: 2019-05-29

## 2019-05-29 RX ORDER — ATORVASTATIN CALCIUM 10 MG/1
40 TABLET, FILM COATED ORAL DAILY
Status: DISCONTINUED | OUTPATIENT
Start: 2019-05-29 | End: 2019-06-07 | Stop reason: HOSPADM

## 2019-05-29 RX ORDER — CARVEDILOL 6.25 MG/1
12.5 TABLET ORAL 2 TIMES DAILY WITH MEALS
Status: DISCONTINUED | OUTPATIENT
Start: 2019-05-29 | End: 2019-06-07 | Stop reason: HOSPADM

## 2019-05-29 RX ORDER — SPIRONOLACTONE 25 MG/1
50 TABLET ORAL DAILY
Status: DISCONTINUED | OUTPATIENT
Start: 2019-05-29 | End: 2019-06-07 | Stop reason: HOSPADM

## 2019-05-29 RX ORDER — DIVALPROEX SODIUM 250 MG/1
250 TABLET, DELAYED RELEASE ORAL 3 TIMES DAILY
Status: DISCONTINUED | OUTPATIENT
Start: 2019-05-29 | End: 2019-05-31

## 2019-05-29 RX ORDER — CETIRIZINE HYDROCHLORIDE 10 MG/1
10 TABLET ORAL DAILY
Status: DISCONTINUED | OUTPATIENT
Start: 2019-05-29 | End: 2019-06-07 | Stop reason: HOSPADM

## 2019-05-29 RX ORDER — LORAZEPAM 2 MG/ML
2 INJECTION INTRAMUSCULAR ONCE
Status: DISCONTINUED | OUTPATIENT
Start: 2019-05-29 | End: 2019-05-29

## 2019-05-29 RX ORDER — GLIMEPIRIDE 4 MG/1
4 TABLET ORAL
Status: DISCONTINUED | OUTPATIENT
Start: 2019-05-29 | End: 2019-06-07 | Stop reason: HOSPADM

## 2019-05-29 RX ORDER — LORAZEPAM 2 MG/ML
2 INJECTION INTRAMUSCULAR ONCE
Status: COMPLETED | OUTPATIENT
Start: 2019-05-29 | End: 2019-05-29

## 2019-05-29 RX ADMIN — DIVALPROEX SODIUM 250 MG: 250 TABLET, DELAYED RELEASE ORAL at 11:23

## 2019-05-29 RX ADMIN — ATORVASTATIN CALCIUM 40 MG: 10 TABLET, FILM COATED ORAL at 11:23

## 2019-05-29 RX ADMIN — HALOPERIDOL LACTATE 10 MG: 5 INJECTION, SOLUTION INTRAMUSCULAR at 10:28

## 2019-05-29 RX ADMIN — LORAZEPAM 2 MG: 2 INJECTION INTRAMUSCULAR; INTRAVENOUS at 10:27

## 2019-05-29 RX ADMIN — LINAGLIPTIN 5 MG: 5 TABLET, FILM COATED ORAL at 11:23

## 2019-05-29 RX ADMIN — METFORMIN HYDROCHLORIDE 1000 MG: 1000 TABLET ORAL at 11:24

## 2019-05-29 RX ADMIN — CARVEDILOL 12.5 MG: 6.25 TABLET, FILM COATED ORAL at 11:24

## 2019-05-29 RX ADMIN — TRAZODONE HYDROCHLORIDE 50 MG: 50 TABLET ORAL at 21:19

## 2019-05-29 RX ADMIN — DIVALPROEX SODIUM 250 MG: 250 TABLET, DELAYED RELEASE ORAL at 13:50

## 2019-05-29 RX ADMIN — SPIRONOLACTONE 50 MG: 25 TABLET ORAL at 11:23

## 2019-05-29 RX ADMIN — METFORMIN HYDROCHLORIDE 1000 MG: 1000 TABLET ORAL at 17:16

## 2019-05-29 RX ADMIN — DIPHENHYDRAMINE HYDROCHLORIDE 50 MG: 50 INJECTION, SOLUTION INTRAMUSCULAR; INTRAVENOUS at 10:33

## 2019-05-29 RX ADMIN — CETIRIZINE HYDROCHLORIDE 10 MG: 10 TABLET, FILM COATED ORAL at 11:24

## 2019-05-29 RX ADMIN — GLIMEPIRIDE 4 MG: 4 TABLET ORAL at 11:24

## 2019-05-29 RX ADMIN — DIVALPROEX SODIUM 250 MG: 250 TABLET, DELAYED RELEASE ORAL at 21:19

## 2019-05-29 RX ADMIN — CARVEDILOL 12.5 MG: 6.25 TABLET, FILM COATED ORAL at 17:16

## 2019-05-29 ASSESSMENT — SLEEP AND FATIGUE QUESTIONNAIRES
AVERAGE NUMBER OF SLEEP HOURS: 4
RESTFUL SLEEP: NO
DO YOU USE A SLEEP AID: YES
DIFFICULTY ARISING: YES
DIFFICULTY STAYING ASLEEP: YES
DO YOU HAVE DIFFICULTY SLEEPING: YES
SLEEP PATTERN: DIFFICULTY FALLING ASLEEP;NIGHTMARES/TERRORS;DISTURBED/INTERRUPTED SLEEP
DIFFICULTY FALLING ASLEEP: YES

## 2019-05-29 ASSESSMENT — PAIN SCALES - GENERAL
PAINLEVEL_OUTOF10: 0
PAINLEVEL_OUTOF10: 0

## 2019-05-29 ASSESSMENT — LIFESTYLE VARIABLES: HISTORY_ALCOHOL_USE: NO

## 2019-05-29 NOTE — ED NOTES
Completed collateral assessment. Patient is a 34year old, male presenting to ED via probate court for non-compliance with psych meds, talking to unseen others, laughing inappropriately, aggressive behaviors. Patient has a mental health hx of paranoid schizophrenia, not compliant with psychiatric medications, and pt last psychiatric admission was on 9/24/2018. Patient non-cooperative, unknown orientation, patient was aggressive in waiting room and refusing to answer questions. Patient mood is labile, congruent affect. Patient does not appear internally stimulated at this time. Patient probated. SW will pursue inpatient admission for safety/stabilization.      YESICA Andrews, [place]  05/28/19 2017

## 2019-05-29 NOTE — PROGRESS NOTES
Group Therapy Note    Date: 5/29/2019  Start Time: 2:00 PM  End Time: 2:45 PM  Number of Participants: 10    Type of Group: Recovery    Wellness Binder Information  Module Name:  n/a  Session Number:  n/a    Patient's Goal: To gain insight and awareness into social supports. Notes: Pt was engaged in group mostly through active listening. Status After Intervention:  Unchanged    Participation Level:  Active Listener    Participation Quality: Appropriate and Attentive      Speech:  normal      Thought Process/Content: Logical      Affective Functioning: Flat      Mood: depressed      Level of consciousness:  Alert, Oriented x4 and Attentive      Response to Learning: Able to verbalize current knowledge/experience and Able to retain information      Endings: None Reported    Modes of Intervention: Education, Support, Socialization, Exploration, Clarifying, Problem-solving and Activity      Discipline Responsible: /Counselor      Signature:  Jordan Gaston
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
## 2019-05-29 NOTE — PLAN OF CARE
Problem: Altered Mood, Depressive Behavior:  Goal: Able to verbalize acceptance of life and situations over which he or she has no control  Description  Able to verbalize acceptance of life and situations over which he or she has no control  5/29/2019 0202 by Emily Concepcion RN  Outcome: Ongoing    PT. HAS NO INSIGHT INTO NEED FOR HOSPITALIZATION. Problem: Altered Mood, Psychotic Behavior:  Goal: Able to verbalize decrease in frequency and intensity of hallucinations  Description  Able to verbalize decrease in frequency and intensity of hallucinations  5/29/2019 1349 by Hedie Herzog RN  Outcome: Ongoing       PT. TALKS TO SELF WHILE IN ROOM ALONE, LAUGHS LOUDLY.    5/29/2019 0202 by Emily Concepcion RN  Outcome: Ongoing    MISINTERPRETS INTENTIONS OF STAFF, OTHERS. Goal: Able to verbalize reality based thinking  Description  Able to verbalize reality based thinking  Outcome: Ongoing  SEE ABOVE.
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
## 2019-05-29 NOTE — PROGRESS NOTES
Patient in room currently with eyes closed. Respirations even and unlabored. No signs of distress observed. Patient periodically wakes up and walks the unit. Can be heard at times laughing in room. Will continue to monitor.

## 2019-05-29 NOTE — ED NOTES
Patient accepted by Dr. Carlyn Paz to [id number]. Marya in admitting notified.       Patti Bhatia RN  05/28/19 2028

## 2019-05-30 LAB
CHOLESTEROL, TOTAL: 113 MG/DL (ref 0–199)
HBA1C MFR BLD: 11.2 % (ref 4–5.6)
HDLC SERPL-MCNC: 32 MG/DL
LDL CHOLESTEROL CALCULATED: 62 MG/DL (ref 0–99)
METER GLUCOSE: 173 MG/DL (ref 74–99)
TRIGL SERPL-MCNC: 97 MG/DL (ref 0–149)
VLDLC SERPL CALC-MCNC: 19 MG/DL

## 2019-05-30 PROCEDURE — 83036 HEMOGLOBIN GLYCOSYLATED A1C: CPT

## 2019-05-30 PROCEDURE — 6370000000 HC RX 637 (ALT 250 FOR IP): Performed by: NURSE PRACTITIONER

## 2019-05-30 PROCEDURE — 36415 COLL VENOUS BLD VENIPUNCTURE: CPT

## 2019-05-30 PROCEDURE — 80061 LIPID PANEL: CPT

## 2019-05-30 PROCEDURE — 1240000000 HC EMOTIONAL WELLNESS R&B

## 2019-05-30 PROCEDURE — 99232 SBSQ HOSP IP/OBS MODERATE 35: CPT | Performed by: NURSE PRACTITIONER

## 2019-05-30 PROCEDURE — 82962 GLUCOSE BLOOD TEST: CPT

## 2019-05-30 RX ORDER — DEXLANSOPRAZOLE 60 MG/1
60 CAPSULE, DELAYED RELEASE ORAL DAILY
Status: DISCONTINUED | OUTPATIENT
Start: 2019-05-30 | End: 2019-06-07 | Stop reason: HOSPADM

## 2019-05-30 RX ORDER — PALIPERIDONE 3 MG/1
3 TABLET, EXTENDED RELEASE ORAL DAILY
Status: DISCONTINUED | OUTPATIENT
Start: 2019-05-30 | End: 2019-05-31

## 2019-05-30 RX ADMIN — DIVALPROEX SODIUM 250 MG: 250 TABLET, DELAYED RELEASE ORAL at 20:27

## 2019-05-30 RX ADMIN — DIVALPROEX SODIUM 250 MG: 250 TABLET, DELAYED RELEASE ORAL at 13:50

## 2019-05-30 RX ADMIN — LINAGLIPTIN 5 MG: 5 TABLET, FILM COATED ORAL at 08:58

## 2019-05-30 RX ADMIN — DIVALPROEX SODIUM 250 MG: 250 TABLET, DELAYED RELEASE ORAL at 08:57

## 2019-05-30 RX ADMIN — CARVEDILOL 12.5 MG: 6.25 TABLET, FILM COATED ORAL at 16:15

## 2019-05-30 RX ADMIN — GLIMEPIRIDE 4 MG: 4 TABLET ORAL at 08:58

## 2019-05-30 RX ADMIN — SPIRONOLACTONE 50 MG: 25 TABLET ORAL at 08:58

## 2019-05-30 RX ADMIN — CETIRIZINE HYDROCHLORIDE 10 MG: 10 TABLET, FILM COATED ORAL at 08:58

## 2019-05-30 RX ADMIN — DEXLANSOPRAZOLE 60 MG: 60 CAPSULE, DELAYED RELEASE ORAL at 17:54

## 2019-05-30 RX ADMIN — METFORMIN HYDROCHLORIDE 1000 MG: 1000 TABLET ORAL at 08:58

## 2019-05-30 RX ADMIN — ATORVASTATIN CALCIUM 40 MG: 10 TABLET, FILM COATED ORAL at 08:57

## 2019-05-30 RX ADMIN — CARVEDILOL 12.5 MG: 6.25 TABLET, FILM COATED ORAL at 08:58

## 2019-05-30 RX ADMIN — PALIPERIDONE 3 MG: 3 TABLET, EXTENDED RELEASE ORAL at 09:48

## 2019-05-30 RX ADMIN — METFORMIN HYDROCHLORIDE 1000 MG: 1000 TABLET ORAL at 16:15

## 2019-05-30 ASSESSMENT — PAIN SCALES - GENERAL
PAINLEVEL_OUTOF10: 0
PAINLEVEL_OUTOF10: 0
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
## 2019-05-30 NOTE — PLAN OF CARE
Problem: Altered Mood, Psychotic Behavior:  Goal: Able to verbalize decrease in frequency and intensity of hallucinations  Description  Able to verbalize decrease in frequency and intensity of hallucinations  Outcome: Ongoing    TALKS TO SELF WHEN IN ROOM ALONE. REMAINS PREOCCUPIED. Goal: Able to verbalize reality based thinking  Description  Able to verbalize reality based thinking  Outcome: Ongoing    SUSPICIOUS OF INTENT OF OTHERS.

## 2019-05-30 NOTE — PROGRESS NOTES
Schimosis       Psychiatric Review of systems  Delusions:  [] Denies [] Endorses   Withdrawals:  [] Denies [] Endorses    Hallucinations: [] Denies [] Endorses    Extra Pyramidal Symptoms: [] Denies [] Endorses      BP (!) 141/75   Pulse 88   Temp 98.2 °F (36.8 °C) (Oral)   Resp 16   Ht 5' 9\" (1.753 m)   Wt 270 lb (122.5 kg)   SpO2 97%   BMI 39.87 kg/m²     Mental Status Examination:    Cognition:      [x] Alert  [x] Awake  [x] Oriented  [x] Person  [x] Place [x] Time      [] drowsy  [] tired  [] lethargic  [] distractable  [] Other    Attention/Concentration:   [x] Attentive  [] Distracted        Memory Recent and Remote: [] Intact   [] Impaired [] Partially Impaired     Language: [] Able to recognize and name objects          [] Unable to recognize and name Objects    Fund of Knowledge:  [] Poor [x]  Fair  [] Good    Speech: [] Normal  [x] Soft  [] Slow  [] Fast [] Pressured            [] Loud [] Dysarthria  [] Incoherent    Appearance: [] Well Groomed  [] Casual Dressed  [] Unkept  [x] Disheveled          [] Normal weight[] Thin  [x] Overweight  [] Obese           Attitude: [] Positive  [] Hostile  [] Demanding  [] Guarded  [] Defensive         [x] Cooperative  []  Uncooperative      Behavior:  [x] Normal Gait  [] Walks with Assistance  [] Vicki Chair     [] Walks with [employer]  [] In Hospital Bed  [] Sitting in Chair    Muscle-Skeletal:  [x] Normal Muscle Tone [] Muscle Atrophy       [] Abnormal Muscle Movement     Eye Contact:  [x] Good eye contact  [] Intermittent Eye Contact  [] Poor Eye Contact        [] Excessive Eye Contact   [] Intrusive    Mood: [x] Depressed  [x] Anxious  [] Irritated  [] Euthymic   [] Angry [] Restless                    [] Apathetic    Affect:  [x] Congruent  [] Incongruent  [] Labile  [] Constricted  [x] Flat  [x] Bizarre                     [] Heightened  [] Exaggerated      Thought Process and Association:  [] Logical [] Illogical       [x] Linear and Goal Directed  []

## 2019-05-31 LAB
METER GLUCOSE: 106 MG/DL (ref 74–99)
METER GLUCOSE: 163 MG/DL (ref 74–99)

## 2019-05-31 PROCEDURE — 1240000000 HC EMOTIONAL WELLNESS R&B

## 2019-05-31 PROCEDURE — 6370000000 HC RX 637 (ALT 250 FOR IP): Performed by: NURSE PRACTITIONER

## 2019-05-31 PROCEDURE — 99232 SBSQ HOSP IP/OBS MODERATE 35: CPT | Performed by: NURSE PRACTITIONER

## 2019-05-31 PROCEDURE — 82962 GLUCOSE BLOOD TEST: CPT

## 2019-05-31 RX ORDER — PALIPERIDONE 6 MG/1
6 TABLET, EXTENDED RELEASE ORAL DAILY
Status: DISCONTINUED | OUTPATIENT
Start: 2019-06-01 | End: 2019-06-01

## 2019-05-31 RX ORDER — DIVALPROEX SODIUM 500 MG/1
500 TABLET, DELAYED RELEASE ORAL EVERY 12 HOURS SCHEDULED
Status: DISCONTINUED | OUTPATIENT
Start: 2019-06-01 | End: 2019-06-03

## 2019-05-31 RX ORDER — PALIPERIDONE 3 MG/1
3 TABLET, EXTENDED RELEASE ORAL ONCE
Status: COMPLETED | OUTPATIENT
Start: 2019-05-31 | End: 2019-05-31

## 2019-05-31 RX ADMIN — PALIPERIDONE 3 MG: 3 TABLET, EXTENDED RELEASE ORAL at 16:58

## 2019-05-31 RX ADMIN — METFORMIN HYDROCHLORIDE 1000 MG: 1000 TABLET ORAL at 16:20

## 2019-05-31 RX ADMIN — PALIPERIDONE 3 MG: 3 TABLET, EXTENDED RELEASE ORAL at 09:05

## 2019-05-31 RX ADMIN — LINAGLIPTIN 5 MG: 5 TABLET, FILM COATED ORAL at 09:49

## 2019-05-31 RX ADMIN — DIVALPROEX SODIUM 250 MG: 250 TABLET, DELAYED RELEASE ORAL at 09:05

## 2019-05-31 RX ADMIN — METFORMIN HYDROCHLORIDE 1000 MG: 1000 TABLET ORAL at 09:06

## 2019-05-31 RX ADMIN — GLIMEPIRIDE 4 MG: 4 TABLET ORAL at 09:05

## 2019-05-31 RX ADMIN — CARVEDILOL 12.5 MG: 6.25 TABLET, FILM COATED ORAL at 16:20

## 2019-05-31 RX ADMIN — ATORVASTATIN CALCIUM 40 MG: 10 TABLET, FILM COATED ORAL at 09:05

## 2019-05-31 RX ADMIN — DIVALPROEX SODIUM 250 MG: 250 TABLET, DELAYED RELEASE ORAL at 13:56

## 2019-05-31 RX ADMIN — DEXLANSOPRAZOLE 60 MG: 60 CAPSULE, DELAYED RELEASE ORAL at 09:06

## 2019-05-31 RX ADMIN — CETIRIZINE HYDROCHLORIDE 10 MG: 10 TABLET, FILM COATED ORAL at 09:05

## 2019-05-31 RX ADMIN — SPIRONOLACTONE 50 MG: 25 TABLET ORAL at 09:05

## 2019-05-31 ASSESSMENT — PAIN SCALES - GENERAL
PAINLEVEL_OUTOF10: 0
PAINLEVEL_OUTOF10: 0

## 2019-05-31 NOTE — BH NOTE
Pt didn't sleep overnight. No issues or complaints noted. Will continue to monitor q 15 min for safety.

## 2019-05-31 NOTE — PLAN OF CARE
Problem: Altered Mood, Psychotic Behavior:  Goal: Able to verbalize decrease in frequency and intensity of hallucinations  Description  Able to verbalize decrease in frequency and intensity of hallucinations  Outcome: Ongoing    PT. TALKS TO SELF WHEN IN ROOM ALONE, BUT DENIES HALLUCINATIONS. Goal: Able to verbalize reality based thinking  Description  Able to verbalize reality based thinking  Outcome: Ongoing    PT. REMAINS PREOCCUPIED WITH DELAYED RESPONSES.

## 2019-05-31 NOTE — PLAN OF CARE
Problem: Altered Mood, Psychotic Behavior:  Goal: Able to verbalize decrease in frequency and intensity of hallucinations  Description  Able to verbalize decrease in frequency and intensity of hallucinations  5/30/2019 2104 by Abril Fung RN  Outcome: Met This Shift     Problem: Altered Mood, Depressive Behavior:  Goal: Able to verbalize and/or display a decrease in depressive symptoms  Description  Able to verbalize and/or display a decrease in depressive symptoms  Outcome: Ongoing   Pt's affect is constricted on approach and admits to feeling sad. Denies any suicidal ideations or feelings of anger.

## 2019-05-31 NOTE — PROGRESS NOTES
DATE OF SERVICE:     5/31/2019    Vivienne Gipson seen today for the purpose of continuation of care. Nursing, social work reports, laboratory studies and vital signs are reviewed. Patient chief complaint today is:             [x] Depression      [] Anxiety        [x] Psychosis         [] Suicidal/Homicidal                         [x] Delusions           [] Aggression          Subjective: Today patient appears internally stimulated, paranoid, and bizarre. Waived hearing today. Med compliant not going to groups. Did not sleep last night. Sleep:  [] Good [] Fair  [x] Poor  Appetite:  [] Good [x] Fair  [] Poor    Depression:  [] Mild [] Moderate [x] Severe                [x] Constant [] Sporadic     Anxiety: [] Mild [] Moderate [x] Severe    [x] Constant [] Sporadic     Delusions: [] Mild [] Moderate [x] Severe     [x] Constant [] Sporadic     [x] Paranoid [] Somatic [] Grandiose     Hallucinations: [] Mild [] Moderate [x] Severe     [] Constant [x] Sporadic    [x] Auditory  [x] Visual [] Tactile       Suicidal: [] Constant [] Sporadic  Homicidal: [] Constant [] Sporadic    Unscheduled Medications     [] Patient Receiving Emergency Medications \" Chemical Restraint\"   [] Requesting PRN medications for anxiety    Medical Review of Systems:     All other than marked systmes have been reviewed and are all negative.     Constitutional Symptoms: []  fever []  Chills  Skin Symptoms: [] rash []  Pruritus   Eye Symptoms: [] Vision unchanged []  recent vision problems[] blurred vision   Respiratory Symptoms:[] shortness of breath [] cough  Cardiovascular Symptoms:  [] chest pain   [] palpitations   Gastrointestinal Symptoms: []  abdominal pain []  nausea []  vomiting []  diarrhea  Genitourinary Symptoms: []  dysuria  []  hematuria   Musculoskeletal Symptoms: []  back pain []  muscle pain []  joint pain  Neurologic Symptoms: []  headache []  dizziness  Hematolymphoid Symptoms: [] Adenopathy [] Bruises   [] Schimosis Circumstantial     Thought Content:  [] Denies [] Endorses [] Suicidal [] Homicidal  [x] Delusional      [x] Paranoid  [] Somatic  [] Grandiose    Perception: []  None  [x] Auditory   [x] Visual  [] tactile   [] olfactory  [] Illusions         Insight: [] Intact  [] Fair  [x] Limited    Judgement:  [] Intact  [] Fair  [x] Limited      Assessment/Plan:        Patient Active Problem List   Diagnosis Code    Schizoaffective disorder, chronic condition with acute exacerbation ([facility] [address] F25.8    Schizophrenia F20.9    Chest pain R07.9    Cardiomyopathy, dilated, nonischemic (HCC) I42.0    H/O CHF Z86.79    Automatic implantable cardioverter-defibrillator in situ Z95.810    HTN (hypertension) I10    DM (diabetes mellitus) ([facility] [address] E11.9    Morbid obesity due to excess calories ([facility] [address] E66.01    RIKKI (obstructive sleep apnea) G47.33    GERD (gastroesophageal reflux disease) K21.9    Hiatal hernia K44.9    Tobacco abuse Z72.0    Hyperlipemia E78.5    Paranoid schizophrenia ([facility] [address] F20.0    Type 2 diabetes mellitus, without long-term current use of insulin ([facility] [address] E11.9         Plan:    []  Patient is refusing medications  [x] Improving as expected   [] Not improving as expected   [] Worsening    []  At Baseline     Increase Invega and Depakote    Reason for more than one antipsychotic:  [x] N/A  [] 3 failed monotherapy(drugs tried):  [] Cross over to a new antipsychotic  [] Taper to monotherapy from polypharmacy  [] Augmentation of Clozapine therapy due to treatment resistance to single therapy      Signed:  Guy Calvillo  5/31/2019  4:28 PM

## 2019-06-01 LAB
METER GLUCOSE: 114 MG/DL (ref 74–99)
METER GLUCOSE: 94 MG/DL (ref 74–99)
METER GLUCOSE: 99 MG/DL (ref 74–99)

## 2019-06-01 PROCEDURE — 1240000000 HC EMOTIONAL WELLNESS R&B

## 2019-06-01 PROCEDURE — 6370000000 HC RX 637 (ALT 250 FOR IP): Performed by: NURSE PRACTITIONER

## 2019-06-01 PROCEDURE — 99231 SBSQ HOSP IP/OBS SF/LOW 25: CPT | Performed by: NURSE PRACTITIONER

## 2019-06-01 PROCEDURE — 82962 GLUCOSE BLOOD TEST: CPT

## 2019-06-01 RX ADMIN — DIVALPROEX SODIUM 500 MG: 500 TABLET, DELAYED RELEASE ORAL at 10:03

## 2019-06-01 RX ADMIN — METFORMIN HYDROCHLORIDE 1000 MG: 1000 TABLET ORAL at 10:04

## 2019-06-01 RX ADMIN — CARVEDILOL 12.5 MG: 6.25 TABLET, FILM COATED ORAL at 16:51

## 2019-06-01 RX ADMIN — SPIRONOLACTONE 50 MG: 25 TABLET ORAL at 10:06

## 2019-06-01 RX ADMIN — GLIMEPIRIDE 4 MG: 4 TABLET ORAL at 10:04

## 2019-06-01 RX ADMIN — CARVEDILOL 12.5 MG: 6.25 TABLET, FILM COATED ORAL at 10:01

## 2019-06-01 RX ADMIN — METFORMIN HYDROCHLORIDE 1000 MG: 1000 TABLET ORAL at 16:51

## 2019-06-01 RX ADMIN — DIVALPROEX SODIUM 500 MG: 500 TABLET, DELAYED RELEASE ORAL at 21:11

## 2019-06-01 RX ADMIN — CETIRIZINE HYDROCHLORIDE 10 MG: 10 TABLET, FILM COATED ORAL at 10:02

## 2019-06-01 RX ADMIN — DEXLANSOPRAZOLE 60 MG: 60 CAPSULE, DELAYED RELEASE ORAL at 11:40

## 2019-06-01 RX ADMIN — LINAGLIPTIN 5 MG: 5 TABLET, FILM COATED ORAL at 10:04

## 2019-06-01 RX ADMIN — ATORVASTATIN CALCIUM 40 MG: 10 TABLET, FILM COATED ORAL at 10:01

## 2019-06-01 NOTE — PLAN OF CARE
Pt frequently awake and conversing with unseen others at HS q 15 min electronic rounding. Maintained control of his behavior. RN hourly rounds provided to pt this shift.

## 2019-06-01 NOTE — PLAN OF CARE
Patient remains watchful. Suspicious. Keeps to self. Pre occupied. Denies voices today. Denies SI and HI.

## 2019-06-01 NOTE — PROGRESS NOTES
DATE OF SERVICE:     6/1/2019    Efra Gipson seen today for the purpose of continuation of care. Nursing, social work reports, laboratory studies and vital signs are reviewed. Patient chief complaint today is:             [x] Depression      [x] Anxiety        [x] Psychosis         [] Suicidal/Homicidal                         [x] Delusions           [] Aggression          Subjective: Today patient is pleasant and cooperative, states the voices are better, they are still there but are less. Patient is medication compliant. Sleep:  [] Good [] Fair  [x] Poor  Appetite:  [] Good [x] Fair  [] Poor    Depression:  [] Mild [] Moderate [x] Severe                [x] Constant [] Sporadic     Anxiety: [] Mild [] Moderate [x] Severe    [x] Constant [] Sporadic     Delusions: [] Mild [] Moderate [x] Severe     [] Constant [x] Sporadic     [x] Paranoid [] Somatic [] Grandiose     Hallucinations: [] Mild [] Moderate [x] Severe     [] Constant [x] Sporadic    [x] Auditory  [x] Visual [] Tactile       Suicidal: [] Constant [] Sporadic  Homicidal: [] Constant [] Sporadic    Unscheduled Medications     [] Patient Receiving Emergency Medications \" Chemical Restraint\"   [] Requesting PRN medications for anxiety    Medical Review of Systems:     All other than marked systmes have been reviewed and are all negative.     Constitutional Symptoms: []  fever []  Chills  Skin Symptoms: [] rash []  Pruritus   Eye Symptoms: [] Vision unchanged []  recent vision problems[] blurred vision   Respiratory Symptoms:[] shortness of breath [] cough  Cardiovascular Symptoms:  [] chest pain   [] palpitations   Gastrointestinal Symptoms: []  abdominal pain []  nausea []  vomiting []  diarrhea  Genitourinary Symptoms: []  dysuria  []  hematuria   Musculoskeletal Symptoms: []  back pain []  muscle pain []  joint pain  Neurologic Symptoms: []  headache []  dizziness  Hematolymphoid Symptoms: [] Adenopathy [] Bruises   [] Schimosis Circumstantial     Thought Content:  [] Denies [] Endorses [] Suicidal [] Homicidal  [x] Delusional      [x] Paranoid  [] Somatic  [] Grandiose    Perception: []  None  [x] Auditory   [x] Visual  [] tactile   [] olfactory  [] Illusions         Insight: [] Intact  [] Fair  [x] Limited    Judgement:  [] Intact  [] Fair  [x] Limited      Assessment/Plan:        Patient Active Problem List   Diagnosis Code    Schizoaffective disorder, chronic condition with acute exacerbation ([facility] [address] F25.8    Schizophrenia F20.9    Chest pain R07.9    Cardiomyopathy, dilated, nonischemic (HCC) I42.0    H/O CHF Z86.79    Automatic implantable cardioverter-defibrillator in situ Z95.810    HTN (hypertension) I10    DM (diabetes mellitus) ([facility] [address] E11.9    Morbid obesity due to excess calories ([facility] [address] E66.01    RIKKI (obstructive sleep apnea) G47.33    GERD (gastroesophageal reflux disease) K21.9    Hiatal hernia K44.9    Tobacco abuse Z72.0    Hyperlipemia E78.5    Paranoid schizophrenia ([facility] [address] F20.0    Type 2 diabetes mellitus, without long-term current use of insulin ([facility] [address] E11.9         Plan:    []  Patient is refusing medications  [x] Improving as expected   [] Not improving as expected   [] Worsening    []  At Baseline     Increase Invega to 9 mg daily     Reason for more than one antipsychotic:  [x] N/A  [] 3 failed monotherapy(drugs tried):  [] Cross over to a new antipsychotic  [] Taper to monotherapy from polypharmacy  [] Augmentation of Clozapine therapy due to treatment resistance to single therapy      Signed:  [facility]  6/1/2019  9:33 AM

## 2019-06-01 NOTE — PLAN OF CARE
Problem: Altered Mood, Depressive Behavior:  Goal: Able to verbalize acceptance of life and situations over which he or she has no control  Description  Able to verbalize acceptance of life and situations over which he or she has no control  Outcome: Ongoing  Goal: Able to verbalize and/or display a decrease in depressive symptoms  Description  Able to verbalize and/or display a decrease in depressive symptoms  6/1/2019 1859 by Luis Genao RN  Outcome: Ongoing  6/1/2019 1413 by Juli Castro RN  Outcome: Met This Shift     Problem: Altered Mood, Psychotic Behavior:  Goal: Able to verbalize decrease in frequency and intensity of hallucinations  Description  Able to verbalize decrease in frequency and intensity of hallucinations  6/1/2019 1859 by Luis Genao RN  Outcome: Met This Shift  6/1/2019 1413 by Juli Castro RN  Outcome: Met This Shift  6/1/2019 0501 by Sagar Naidu RN  Outcome: Ongoing  Goal: Able to verbalize reality based thinking  Description  Able to verbalize reality based thinking  6/1/2019 1859 by Luis Genao RN  Outcome: Ongoing  6/1/2019 1413 by Juli Castro RN  Outcome: Ongoing     Problem: Falls - Risk of:  Goal: Will remain free from falls  Description  Will remain free from falls  Outcome: Met This Shift  Goal: Absence of physical injury  Description  Absence of physical injury  Outcome: Met This Shift      Pt denies suicidal ideations, homicidal ideations and hallucinations. Pt out on the unit playing cards with his peers. Pt has poverty of content with short word answers. Delayed responses with repeating of the question. Expressionless unless around peers. Appetite appropriate. Will continue to monitor.

## 2019-06-02 LAB
METER GLUCOSE: 77 MG/DL (ref 74–99)
METER GLUCOSE: 98 MG/DL (ref 74–99)

## 2019-06-02 PROCEDURE — 1240000000 HC EMOTIONAL WELLNESS R&B

## 2019-06-02 PROCEDURE — 6370000000 HC RX 637 (ALT 250 FOR IP): Performed by: NURSE PRACTITIONER

## 2019-06-02 PROCEDURE — 99231 SBSQ HOSP IP/OBS SF/LOW 25: CPT | Performed by: NURSE PRACTITIONER

## 2019-06-02 PROCEDURE — 82962 GLUCOSE BLOOD TEST: CPT

## 2019-06-02 RX ORDER — PALIPERIDONE 6 MG/1
12 TABLET, EXTENDED RELEASE ORAL DAILY
Status: DISCONTINUED | OUTPATIENT
Start: 2019-06-02 | End: 2019-06-07 | Stop reason: HOSPADM

## 2019-06-02 RX ADMIN — LINAGLIPTIN 5 MG: 5 TABLET, FILM COATED ORAL at 09:27

## 2019-06-02 RX ADMIN — DEXLANSOPRAZOLE 60 MG: 60 CAPSULE, DELAYED RELEASE ORAL at 09:36

## 2019-06-02 RX ADMIN — GLIMEPIRIDE 4 MG: 4 TABLET ORAL at 09:27

## 2019-06-02 RX ADMIN — SPIRONOLACTONE 50 MG: 25 TABLET ORAL at 09:26

## 2019-06-02 RX ADMIN — CARVEDILOL 12.5 MG: 6.25 TABLET, FILM COATED ORAL at 17:47

## 2019-06-02 RX ADMIN — CARVEDILOL 12.5 MG: 6.25 TABLET, FILM COATED ORAL at 09:27

## 2019-06-02 RX ADMIN — DIVALPROEX SODIUM 500 MG: 500 TABLET, DELAYED RELEASE ORAL at 09:34

## 2019-06-02 RX ADMIN — CETIRIZINE HYDROCHLORIDE 10 MG: 10 TABLET, FILM COATED ORAL at 09:26

## 2019-06-02 RX ADMIN — PALIPERIDONE 12 MG: 6 TABLET, EXTENDED RELEASE ORAL at 09:27

## 2019-06-02 RX ADMIN — METFORMIN HYDROCHLORIDE 1000 MG: 1000 TABLET ORAL at 09:26

## 2019-06-02 RX ADMIN — DIVALPROEX SODIUM 500 MG: 500 TABLET, DELAYED RELEASE ORAL at 20:27

## 2019-06-02 RX ADMIN — ATORVASTATIN CALCIUM 40 MG: 10 TABLET, FILM COATED ORAL at 09:34

## 2019-06-02 RX ADMIN — METFORMIN HYDROCHLORIDE 1000 MG: 1000 TABLET ORAL at 17:47

## 2019-06-02 ASSESSMENT — PAIN SCALES - GENERAL: PAINLEVEL_OUTOF10: 0

## 2019-06-02 NOTE — PROGRESS NOTES
DATE OF SERVICE:     6/2/2019    Domi Espinoza [place] seen today for the purpose of continuation of care. Nursing, social work reports, laboratory studies and vital signs are reviewed. Patient chief complaint today is:             [x] Depression      [x] Anxiety        [x] Psychosis         [] Suicidal/Homicidal                         [x] Delusions           [] Aggression          Subjective: Today patient is pleasant and cooperative, states the [facility] are not there, but is having AH still. Patient states they are getting better. Patient is medication compliant. Sleep:  [] Good [x] Fair  [] Poor  Appetite:  [] Good [x] Fair  [] Poor    Depression:  [] Mild [] Moderate [x] Severe                [x] Constant [] Sporadic     Anxiety: [] Mild [] Moderate [x] Severe    [x] Constant [] Sporadic     Delusions: [] Mild [] Moderate [x] Severe     [] Constant [x] Sporadic     [x] Paranoid [] Somatic [] Grandiose     Hallucinations: [] Mild [x] Moderate [] Severe     [] Constant [x] Sporadic    [x] Auditory  [] Visual [] Tactile       Suicidal: [] Constant [] Sporadic  Homicidal: [] Constant [] Sporadic    Unscheduled Medications     [] Patient Receiving Emergency Medications \" Chemical Restraint\"   [] Requesting PRN medications for anxiety    Medical Review of Systems:     All other than marked systmes have been reviewed and are all negative.     Constitutional Symptoms: []  fever []  Chills  Skin Symptoms: [] rash []  Pruritus   Eye Symptoms: [] Vision unchanged []  recent vision problems[] blurred vision   Respiratory Symptoms:[] shortness of breath [] cough  Cardiovascular Symptoms:  [] chest pain   [] palpitations   Gastrointestinal Symptoms: []  abdominal pain []  nausea []  vomiting []  diarrhea  Genitourinary Symptoms: []  dysuria  []  hematuria   Musculoskeletal Symptoms: []  back pain []  muscle pain []  joint pain  Neurologic Symptoms: []  headache []  dizziness  Hematolymphoid Symptoms: [] Adenopathy [] Bruises   [] Schimosis       Psychiatric Review of systems  Delusions:  [] Denies [] Endorses   Withdrawals:  [] Denies [] Endorses    Hallucinations: [] Denies [] Endorses    Extra Pyramidal Symptoms: [] Denies [] Endorses      BP (!) 142/88   Pulse 100   Temp 97.5 °F (36.4 °C) (Oral)   Resp 16   Ht 5' 9\" (1.753 m)   Wt 270 lb (122.5 kg)   SpO2 97%   BMI 39.87 kg/m²     Mental Status Examination:    Cognition:      [x] Alert  [x] Awake  [x] Oriented  [x] Person  [x] Place [x] Time      [] drowsy  [] tired  [] lethargic  [] distractable  [] Other    Attention/Concentration:   [x] Attentive  [] Distracted        Memory Recent and Remote: [x] Intact   [] Impaired [] Partially Impaired     Language: [] Able to recognize and name objects          [] Unable to recognize and name Objects    Fund of Knowledge:  [] Poor [x]  Fair  [] Good    Speech: [] Normal  [x] Soft  [] Slow  [] Fast [] Pressured            [] Loud [] Dysarthria  [] Incoherent    Appearance: [] Well Groomed  [] Casual Dressed  [] Unkept  [x] Disheveled          [] Normal weight[] Thin  [x] Overweight  [] Obese           Attitude: [] Positive  [] Hostile  [] Demanding  [] Guarded  [] Defensive         [x] Cooperative  []  Uncooperative      Behavior:  [x] Normal Gait  [] Walks with Assistance  [] Vicki Chair     [] Walks with Dur[place] Leisa  [] In Hospital Bed  [] Sitting in Chair    Muscle-Skeletal:  [x] Normal Muscle Tone [] Muscle Atrophy       [] Abnormal Muscle Movement     Eye Contact:  [x] Good eye contact  [] Intermittent Eye Contact  [] Poor Eye Contact        [] Excessive Eye Contact   [] Intrusive    Mood: [x] Depressed  [x] Anxious  [] Irritated  [] Euthymic   [] Angry [] Restless                    [] Apathetic    Affect:  [x] Congruent  [] Incongruent  [] Labile  [] Constricted  [] Flat  [x] Bizarre                     [] Heightened  [] Exaggerated      Thought Process and Association:  [] Logical [] Illogical       [x] Linear and Goal Directed  [] Tangential  [] Circumstantial     Thought Content:  [] Denies [] Endorses [] Suicidal [] Homicidal  [x] Delusional      [x] Paranoid  [] Somatic  [] Grandiose    Perception: []  None  [x] Auditory   [] Visual  [] tactile   [] olfactory  [] Illusions         Insight: [] Intact  [] Fair  [x] Limited    Judgement:  [] Intact  [] Fair  [x] Limited      Assessment/Plan:        Patient Active Problem List   Diagnosis Code    Schizoaffective disorder, chronic condition with acute exacerbation ([address] F25.8    Schizophrenia F20.9    Chest pain R07.9    Cardiomyopathy, dilated, nonischemic (HCC) I42.0    H/O CHF Z86.79    Automatic implantable cardioverter-defibrillator in situ Z95.810    HTN (hypertension) I10    DM (diabetes mellitus) ([facility] E11.9    Morbid obesity due to excess calories ([address] E66.01    RIKKI (obstructive sleep apnea) G47.33    GERD (gastroesophageal reflux disease) K21.9    Hiatal hernia K44.9    Tobacco abuse Z72.0    Hyperlipemia E78.5    Paranoid schizophrenia ([facility] F20.0    Type 2 diabetes mellitus, without long-term current use of insulin ([address] E11.9         Plan:    []  Patient is refusing medications  [x] Improving as expected   [] Not improving as expected   [] Worsening    []  At Baseline     Increase Invega to 12 mg daily     Reason for more than one antipsychotic:  [x] N/A  [] 3 failed monotherapy(drugs tried):  [] Cross over to a new antipsychotic  [] Taper to monotherapy from polypharmacy  [] Augmentation of Clozapine therapy due to treatment resistance to single therapy      Signed:  Ben Rios  6/2/2019  10:13 AM

## 2019-06-02 NOTE — PLAN OF CARE
Patient reports having \"up/Downs\". Hearing voices \" different things\". Denies SI and HI. Keeps to self.

## 2019-06-02 NOTE — PLAN OF CARE
Problem: Altered Mood, Depressive Behavior:  Goal: Able to verbalize and/or display a decrease in depressive symptoms  Description  Able to verbalize and/or display a decrease in depressive symptoms  6/2/2019 1620 by Birgit Marcus RN  Outcome: Ongoing  6/2/2019 1352 by Linette Reagan RN  Outcome: Ongoing     Problem: Altered Mood, Psychotic Behavior:  Goal: Able to verbalize decrease in frequency and intensity of hallucinations  Description  Able to verbalize decrease in frequency and intensity of hallucinations  6/2/2019 1620 by Birgit Marcus RN  Outcome: Ongoing  6/2/2019 1352 by Linette Reagan RN  Outcome: Ongoing  Goal: Able to verbalize reality based thinking  Description  Able to verbalize reality based thinking  6/2/2019 1620 by Birgit Marcus RN  Outcome: Ongoing  6/2/2019 0502 by Taty Mathew RN  Outcome: Ongoing       Pt denies suicidal ideations and homicidal ideations. Pt positive for voices. \"Just a little bit there. \" Pt is watchful and suspicious. Out on the unit. Social with select few. Pt denies any medical concerns thus far. Expressionless and sad. Will continue to monitor.

## 2019-06-02 NOTE — GROUP NOTE
Group Therapy Note    Date: 6/2/2019  Start Time: 7589  End Time:  1105  Number of Participants: 11    Type of Group: Relapse Prevention    Patient's Goal:  Gain insight into early warning signs    Notes:  Group reviewed common early warning signs and pt engaged in discussion on pt's own warning signs. Group then discussed action steps to take if early warning signs occur. Status After Intervention:  Unchanged    Participation Level:  Active Listener and Interactive    Participation Quality: Appropriate, Attentive, Sharing and Supportive      Speech:  normal      Thought Process/Content: Logical      Affective Functioning: Constricted/Restricted      Mood: depressed      Level of consciousness:  Alert, Oriented x4 and Attentive      Response to Learning: Able to verbalize current knowledge/experience and Able to verbalize/acknowledge new learning      Endings: None Reported    Modes of Intervention: Education, Support, Socialization and Exploration      Discipline Responsible: /Counselor      Signature:  Yulisa Sylvester MSW, LSW

## 2019-06-02 NOTE — PLAN OF CARE
Pt observed awake at HS q 15 min electronic rounding. RN hourly rounds provided to pt this shift. Pt restless, up to exercise bike several times, slowly paces. Appeared paranoid and suspicious, refused offer of PRN medication. Maintained control of his behavior. Will continue to monitor closely.

## 2019-06-03 LAB
METER GLUCOSE: 108 MG/DL (ref 74–99)
METER GLUCOSE: 110 MG/DL (ref 74–99)
VALPROIC ACID LEVEL: 96 MCG/ML (ref 50–100)

## 2019-06-03 PROCEDURE — 6370000000 HC RX 637 (ALT 250 FOR IP): Performed by: NURSE PRACTITIONER

## 2019-06-03 PROCEDURE — 80164 ASSAY DIPROPYLACETIC ACD TOT: CPT

## 2019-06-03 PROCEDURE — 99231 SBSQ HOSP IP/OBS SF/LOW 25: CPT | Performed by: NURSE PRACTITIONER

## 2019-06-03 PROCEDURE — 36415 COLL VENOUS BLD VENIPUNCTURE: CPT

## 2019-06-03 PROCEDURE — 82962 GLUCOSE BLOOD TEST: CPT

## 2019-06-03 PROCEDURE — 1240000000 HC EMOTIONAL WELLNESS R&B

## 2019-06-03 RX ORDER — DIVALPROEX SODIUM 500 MG/1
1000 TABLET, DELAYED RELEASE ORAL EVERY 12 HOURS SCHEDULED
Status: DISCONTINUED | OUTPATIENT
Start: 2019-06-03 | End: 2019-06-07 | Stop reason: HOSPADM

## 2019-06-03 RX ADMIN — CARVEDILOL 12.5 MG: 6.25 TABLET, FILM COATED ORAL at 16:47

## 2019-06-03 RX ADMIN — PALIPERIDONE 12 MG: 6 TABLET, EXTENDED RELEASE ORAL at 10:35

## 2019-06-03 RX ADMIN — DEXLANSOPRAZOLE 60 MG: 60 CAPSULE, DELAYED RELEASE ORAL at 09:29

## 2019-06-03 RX ADMIN — ATORVASTATIN CALCIUM 40 MG: 10 TABLET, FILM COATED ORAL at 09:29

## 2019-06-03 RX ADMIN — SPIRONOLACTONE 50 MG: 25 TABLET ORAL at 09:30

## 2019-06-03 RX ADMIN — GLIMEPIRIDE 4 MG: 4 TABLET ORAL at 09:30

## 2019-06-03 RX ADMIN — LINAGLIPTIN 5 MG: 5 TABLET, FILM COATED ORAL at 09:30

## 2019-06-03 RX ADMIN — DIVALPROEX SODIUM 1000 MG: 500 TABLET, DELAYED RELEASE ORAL at 10:35

## 2019-06-03 RX ADMIN — CARVEDILOL 12.5 MG: 6.25 TABLET, FILM COATED ORAL at 10:35

## 2019-06-03 RX ADMIN — DIVALPROEX SODIUM 1000 MG: 500 TABLET, DELAYED RELEASE ORAL at 21:10

## 2019-06-03 RX ADMIN — METFORMIN HYDROCHLORIDE 1000 MG: 1000 TABLET ORAL at 09:30

## 2019-06-03 RX ADMIN — CETIRIZINE HYDROCHLORIDE 10 MG: 10 TABLET, FILM COATED ORAL at 09:30

## 2019-06-03 ASSESSMENT — PAIN SCALES - GENERAL: PAINLEVEL_OUTOF10: 0
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
## 2019-06-03 NOTE — PROGRESS NOTES
DATE OF SERVICE:     6/3/2019    Ramesh Hernandez [place] seen today for the purpose of continuation of care. Nursing, social work reports, laboratory studies and vital signs are reviewed. Patient chief complaint today is:             [x] Depression      [x] Anxiety        [x] Psychosis         [] Suicidal/Homicidal                         [x] Delusions           [] Aggression          Subjective: Today patient is pleasant and cooperative, states he has AVH today, but states they are getting better. Patient only slept 3.5 hours last night. Patient is medication compliant. Sleep:  [] Good [] Fair  [x] Poor  Appetite:  [] Good [x] Fair  [] Poor    Depression:  [] Mild [] Moderate [x] Severe                [x] Constant [] Sporadic     Anxiety: [] Mild [] Moderate [x] Severe    [x] Constant [] Sporadic     Delusions: [] Mild [] Moderate [x] Severe     [] Constant [x] Sporadic     [x] Paranoid [] Somatic [] Grandiose     Hallucinations: [] Mild [x] Moderate [] Severe     [] Constant [x] Sporadic    [x] Auditory  [x] Visual [] Tactile       Suicidal: [] Constant [] Sporadic  Homicidal: [] Constant [] Sporadic    Unscheduled Medications     [] Patient Receiving Emergency Medications \" Chemical Restraint\"   [] Requesting PRN medications for anxiety    Medical Review of Systems:     All other than marked systmes have been reviewed and are all negative.     Constitutional Symptoms: []  fever []  Chills  Skin Symptoms: [] rash []  Pruritus   Eye Symptoms: [] Vision unchanged []  recent vision problems[] blurred vision   Respiratory Symptoms:[] shortness of breath [] cough  Cardiovascular Symptoms:  [] chest pain   [] palpitations   Gastrointestinal Symptoms: []  abdominal pain []  nausea []  vomiting []  diarrhea  Genitourinary Symptoms: []  dysuria  []  hematuria   Musculoskeletal Symptoms: []  back pain []  muscle pain []  joint pain  Neurologic Symptoms: []  headache []  dizziness  Hematolymphoid Symptoms: [] Adenopathy [] Bruises   [] Schimosis       Psychiatric Review of systems  Delusions:  [] Denies [] Endorses   Withdrawals:  [] Denies [] Endorses    Hallucinations: [] Denies [] Endorses    Extra Pyramidal Symptoms: [] Denies [] Endorses      BP 99/67   Pulse 81   Temp 97.4 °F (36.3 °C) (Temporal)   Resp 18   Ht 5' 9\" (1.753 m)   Wt 270 lb (122.5 kg)   SpO2 97%   BMI 39.87 kg/m²     Mental Status Examination:    Cognition:      [x] Alert  [x] Awake  [x] Oriented  [x] Person  [x] Place [x] Time      [] drowsy  [] tired  [] lethargic  [] distractable  [] Other    Attention/Concentration:   [x] Attentive  [] Distracted        Memory Recent and Remote: [x] Intact   [] Impaired [] Partially Impaired     Language: [] Able to recognize and name objects          [] Unable to recognize and name Objects    Fund of Knowledge:  [] Poor [x]  Fair  [] Good    Speech: [] Normal  [x] Soft  [] Slow  [] Fast [] Pressured            [] Loud [] Dysarthria  [] Incoherent    Appearance: [] Well Groomed  [x] Casual Dressed  [] Unkept  [] Disheveled          [] Normal weight[] Thin  [x] Overweight  [] Obese           Attitude: [] Positive  [] Hostile  [] Demanding  [] Guarded  [] Defensive         [x] Cooperative  []  Uncooperative      Behavior:  [x] Normal Gait  [] Walks with Assistance  [] Vicki Chair     [] Walks with Devon Klinefelter  [] In Hospital Bed  [] Sitting in Chair    Muscle-Skeletal:  [x] Normal Muscle Tone [] Muscle Atrophy       [] Abnormal Muscle Movement     Eye Contact:  [x] Good eye contact  [] Intermittent Eye Contact  [] Poor Eye Contact        [] Excessive Eye Contact   [] Intrusive    Mood: [x] Depressed  [x] Anxious  [] Irritated  [] Euthymic   [] Angry [] Restless                    [] Apathetic    Affect:  [x] Congruent  [] Incongruent  [] Labile  [] Constricted  [] Flat  [] Bizarre                     [] Heightened  [] Exaggerated      Thought Process and Association:  [] Logical [] Illogical       [x] Linear and Goal Directed  [] Tangential  [] Circumstantial     Thought Content:  [] Denies [] Endorses [] Suicidal [] Homicidal  [x] Delusional      [x] Paranoid  [] Somatic  [] Grandiose    Perception: []  None  [x] Auditory   [x] Visual  [] tactile   [] olfactory  [] Illusions         Insight: [] Intact  [] Fair  [x] Limited    Judgement:  [] Intact  [] Fair  [x] Limited      Assessment/Plan:        Patient Active Problem List   Diagnosis Code    Schizoaffective disorder, chronic condition with acute exacerbation ([facility] F25.8    Schizophrenia F20.9    Chest pain R07.9    Cardiomyopathy, dilated, nonischemic (HCC) I42.0    H/O CHF Z86.79    Automatic implantable cardioverter-defibrillator in situ Z95.810    HTN (hypertension) I10    DM (diabetes mellitus) ([facility] E11.9    Morbid obesity due to excess calories ([facility] E66.01    RIKKI (obstructive sleep apnea) G47.33    GERD (gastroesophageal reflux disease) K21.9    Hiatal hernia K44.9    Tobacco abuse Z72.0    Hyperlipemia E78.5    Paranoid schizophrenia ([facility] F20.0    Type 2 diabetes mellitus, without long-term current use of insulin ([facility] E11.9         Plan:    []  Patient is refusing medications  [x] Improving as expected   [] Not improving as expected   [] Worsening    []  At Baseline     Increase Depakote to 1000 mg BID     Reason for more than one antipsychotic:  [x] N/A  [] 3 failed monotherapy(drugs tried):  [] Cross over to a new antipsychotic  [] Taper to monotherapy from polypharmacy  [] Augmentation of Clozapine therapy due to treatment resistance to single therapy      Signed:  Alyse Ahn  6/3/2019  9:25 AM

## 2019-06-03 NOTE — GROUP NOTE
Group Therapy Note    Date: June 2    Group Start Time: 1945  Group End Time: 2025  Group Topic: Healthy Living/Wellness    [facility]    Isidra Rhodes RN; Boogie Yepez, PANFILO        Group Therapy Note    Patient attended and participated in Wellness and Wrap up group.      Attendees: 20

## 2019-06-03 NOTE — PLAN OF CARE
Problem: Altered Mood, Psychotic Behavior:  Goal: Able to verbalize reality based thinking  Description  Able to verbalize reality based thinking  Outcome: Met This Shift    No voiced delusions. Problem: Altered Mood, Psychotic Behavior:  Goal: Able to verbalize decrease in frequency and intensity of hallucinations  Description  Able to verbalize decrease in frequency and intensity of hallucinations  6/3/2019 1207 by Ricardo Jain RN  Outcome: Ongoing  6/3/2019 0506 by Scott Hickey RN  Outcome: Ongoing    PT. REPORTS AUDITORY HALLUCINATIONS, VOICES THAT TELL PT.\"JOKES\".

## 2019-06-03 NOTE — PROGRESS NOTES
Attended community meeting, declined to share goal for the day stated not yet when prompted to share.

## 2019-06-03 NOTE — PLAN OF CARE
Pt observed to be awake frequently at HS q 15 min electronic rounding. Pt preoccupied and interacts with unseen others. Maintained control of his behavior. RN hourly rounds provided to pt this shift. Will continue to monitor closely.

## 2019-06-04 LAB
METER GLUCOSE: 106 MG/DL (ref 74–99)
METER GLUCOSE: 84 MG/DL (ref 74–99)

## 2019-06-04 PROCEDURE — 6370000000 HC RX 637 (ALT 250 FOR IP): Performed by: NURSE PRACTITIONER

## 2019-06-04 PROCEDURE — 99231 SBSQ HOSP IP/OBS SF/LOW 25: CPT | Performed by: NURSE PRACTITIONER

## 2019-06-04 PROCEDURE — 1240000000 HC EMOTIONAL WELLNESS R&B

## 2019-06-04 PROCEDURE — 82962 GLUCOSE BLOOD TEST: CPT

## 2019-06-04 RX ORDER — CETIRIZINE HYDROCHLORIDE 10 MG/1
10 TABLET ORAL DAILY PRN
Status: DISCONTINUED | OUTPATIENT
Start: 2019-06-04 | End: 2019-06-07 | Stop reason: HOSPADM

## 2019-06-04 RX ORDER — GUAIFENESIN/DEXTROMETHORPHAN 100-10MG/5
5 SYRUP ORAL EVERY 6 HOURS PRN
Status: DISCONTINUED | OUTPATIENT
Start: 2019-06-04 | End: 2019-06-07 | Stop reason: HOSPADM

## 2019-06-04 RX ORDER — LORATADINE 10 MG/1
10 TABLET ORAL DAILY PRN
Status: DISCONTINUED | OUTPATIENT
Start: 2019-06-04 | End: 2019-06-04 | Stop reason: CLARIF

## 2019-06-04 RX ADMIN — GLIMEPIRIDE 4 MG: 4 TABLET ORAL at 09:19

## 2019-06-04 RX ADMIN — DEXLANSOPRAZOLE 60 MG: 60 CAPSULE, DELAYED RELEASE ORAL at 09:19

## 2019-06-04 RX ADMIN — CARVEDILOL 12.5 MG: 6.25 TABLET, FILM COATED ORAL at 17:08

## 2019-06-04 RX ADMIN — METFORMIN HYDROCHLORIDE 1000 MG: 1000 TABLET ORAL at 17:09

## 2019-06-04 RX ADMIN — LINAGLIPTIN 5 MG: 5 TABLET, FILM COATED ORAL at 09:20

## 2019-06-04 RX ADMIN — SPIRONOLACTONE 50 MG: 25 TABLET ORAL at 09:20

## 2019-06-04 RX ADMIN — DIVALPROEX SODIUM 1000 MG: 500 TABLET, DELAYED RELEASE ORAL at 20:21

## 2019-06-04 RX ADMIN — CETIRIZINE HYDROCHLORIDE 10 MG: 10 TABLET, FILM COATED ORAL at 09:19

## 2019-06-04 RX ADMIN — CARVEDILOL 12.5 MG: 6.25 TABLET, FILM COATED ORAL at 09:19

## 2019-06-04 RX ADMIN — METFORMIN HYDROCHLORIDE 1000 MG: 1000 TABLET ORAL at 09:19

## 2019-06-04 RX ADMIN — DIVALPROEX SODIUM 1000 MG: 500 TABLET, DELAYED RELEASE ORAL at 09:19

## 2019-06-04 RX ADMIN — ATORVASTATIN CALCIUM 40 MG: 10 TABLET, FILM COATED ORAL at 09:19

## 2019-06-04 RX ADMIN — PALIPERIDONE 12 MG: 6 TABLET, EXTENDED RELEASE ORAL at 09:18

## 2019-06-04 ASSESSMENT — PAIN SCALES - GENERAL
PAINLEVEL_OUTOF10: 0

## 2019-06-04 NOTE — PLAN OF CARE
Problem: Altered Mood, Psychotic Behavior:  Goal: Able to verbalize decrease in frequency and intensity of hallucinations  Description  Able to verbalize decrease in frequency and intensity of hallucinations  6/4/2019 0928 by Lebron Edmondson RN  Outcome: Ongoing  6/3/2019 2025 by Grayson Raymond RN  Outcome: Ongoing    PT. WAS NOT OBSERVED TALKING TO SELF THIS A. M..     Problem: Altered Mood, Psychotic Behavior:  Goal: Able to verbalize reality based thinking  Description  Able to verbalize reality based thinking  Outcome: Met This Shift    NO VOICED DELUSIONS.

## 2019-06-04 NOTE — PROGRESS NOTES
DATE OF SERVICE:     6/4/2019    Rosa Gipson seen today for the purpose of continuation of care. Nursing, social work reports, laboratory studies and vital signs are reviewed. Patient chief complaint today is:             [x] Depression      [x] Anxiety        [x] Psychosis         [] Suicidal/Homicidal                         [x] Delusions           [] Aggression          Subjective: Today patient is pleasant and cooperative, states he has AVH today, but states they are getting better. Patient is agreeable to [place]. Patient only slept 2.5 hours last night. Patient is medication compliant. Sleep:  [] Good [] Fair  [x] Poor  Appetite:  [] Good [x] Fair  [] Poor    Depression:  [] Mild [] Moderate [x] Severe                [x] Constant [] Sporadic     Anxiety: [] Mild [] Moderate [x] Severe    [x] Constant [] Sporadic     Delusions: [] Mild [x] Moderate [] Severe     [] Constant [x] Sporadic     [x] Paranoid [] Somatic [] Grandiose     Hallucinations: [] Mild [x] Moderate [] Severe     [] Constant [x] Sporadic    [x] Auditory  [x] Visual [] Tactile       Suicidal: [] Constant [] Sporadic  Homicidal: [] Constant [] Sporadic    Unscheduled Medications     [] Patient Receiving Emergency Medications \" Chemical Restraint\"   [] Requesting PRN medications for anxiety    Medical Review of Systems:     All other than marked systmes have been reviewed and are all negative.     Constitutional Symptoms: []  fever []  Chills  Skin Symptoms: [] rash []  Pruritus   Eye Symptoms: [] Vision unchanged []  recent vision problems[] blurred vision   Respiratory Symptoms:[] shortness of breath [] cough  Cardiovascular Symptoms:  [] chest pain   [] palpitations   Gastrointestinal Symptoms: []  abdominal pain []  nausea []  vomiting []  diarrhea  Genitourinary Symptoms: []  dysuria  []  hematuria   Musculoskeletal Symptoms: []  back pain []  muscle pain []  joint pain  Neurologic Symptoms: []  headache [] dizziness  Hematolymphoid Symptoms: [] Adenopathy [] Bruises   [] Schimosis       Psychiatric Review of systems  Delusions:  [] Denies [] Endorses   Withdrawals:  [] Denies [] Endorses    Hallucinations: [] Denies [] Endorses    Extra Pyramidal Symptoms: [] Denies [] Endorses      BP (!) 150/77   Pulse 100   Temp 98.4 °F (36.9 °C) (Temporal)   Resp 18   Ht 5' 9\" (1.753 m)   Wt 270 lb (122.5 kg)   SpO2 97%   BMI 39.87 kg/m²     Mental Status Examination:    Cognition:      [x] Alert  [x] Awake  [x] Oriented  [x] Person  [x] Place [x] Time      [] drowsy  [] tired  [] lethargic  [] distractable  [] Other    Attention/Concentration:   [x] Attentive  [] Distracted        Memory Recent and Remote: [x] Intact   [] Impaired [] Partially Impaired     Language: [] Able to recognize and name objects          [] Unable to recognize and name Objects    Fund of Knowledge:  [] Poor [x]  Fair  [] Good    Speech: [] Normal  [x] Soft  [] Slow  [] Fast [] Pressured            [] Loud [] Dysarthria  [] Incoherent    Appearance: [] Well Groomed  [x] Casual Dressed  [] Unkept  [] Disheveled          [] Normal weight[] Thin  [x] Overweight  [] Obese           Attitude: [] Positive  [] Hostile  [] Demanding  [] Guarded  [] Defensive         [x] Cooperative  []  Uncooperative      Behavior:  [x] Normal Gait  [] Walks with Assistance  [] Vicki Chair     [] Walks with [place]  [] In Hospital Bed  [] Sitting in Chair    Muscle-Skeletal:  [x] Normal Muscle Tone [] Muscle Atrophy       [] Abnormal Muscle Movement     Eye Contact:  [x] Good eye contact  [] Intermittent Eye Contact  [] Poor Eye Contact        [] Excessive Eye Contact   [] Intrusive    Mood: [x] Depressed  [x] Anxious  [] Irritated  [] Euthymic   [] Angry [] Restless                    [] Apathetic    Affect:  [x] Congruent  [] Incongruent  [] Labile  [] Constricted  [] Flat  [] Bizarre                     [] Heightened  [] Exaggerated      Thought Process and Association:

## 2019-06-04 NOTE — PROGRESS NOTES
Patient attended community meeting/morning stretch. Patient identified goal for the day as:  \"Keep an even keel\"

## 2019-06-04 NOTE — PLAN OF CARE
Patient is resting quietly in bed with eyes closed at this time. No signs of distress or discomfort noted. No PRN medications given thus far. Safety needs met. No unit problems reported. Will continue to observe and support.      Problem: Altered Mood, Depressive Behavior:  Goal: Able to verbalize acceptance of life and situations over which he or she has no control  Description  Able to verbalize acceptance of life and situations over which he or she has no control  Outcome: Ongoing     Problem: Altered Mood, Depressive Behavior:  Goal: Able to verbalize and/or display a decrease in depressive symptoms  Description  Able to verbalize and/or display a decrease in depressive symptoms  6/4/2019 0519 by Phill Garg RN  Outcome: Ongoing

## 2019-06-04 NOTE — PLAN OF CARE
BED RESTING AT INITIAL ROUNDS. REPORTS HE HAD A SHOT TODAY BUT SAY'S HE \" DOESN'T KNOW WHAT IT WAS\". EXPLAINED IT WAS INVEGA SUSTENNA AND IT'S EXPECTED ACTION. DID NOT APPEAR IT WAS UNDERSTOOD BY THE PT. SPEECH IS QUIET. REPORTS HE IS STILL HEARING VOICES \"TELLING  ME JOKES\" DENIES COMMAND OR DEROGATORY STATEMENTS. REPORTS HE IS TIRED AND SLEEPING NOW. DENIES SUICIDAL IDEATIONS OR INTENT TO HARM HIMSELF OR OTHERS.

## 2019-06-04 NOTE — PLAN OF CARE
Problem: Altered Mood, Depressive Behavior:  Goal: Able to verbalize and/or display a decrease in depressive symptoms  Description  Able to verbalize and/or display a decrease in depressive symptoms  Outcome: Ongoing     Problem: Altered Mood, Psychotic Behavior:  Goal: Able to verbalize decrease in frequency and intensity of hallucinations  Description  Able to verbalize decrease in frequency and intensity of hallucinations  6/3/2019 2025 by Lauren Schilder, RN  Outcome: Ongoing   Pt has been withdrawn to his room this shift. Continues to have blunted affect with monotone speech. Admits to feeling very depressed but denies any suicidal ideations. Also admits to still hearing voices. [place] pt laughing to himself in his room after interaction and door was shut.

## 2019-06-04 NOTE — GROUP NOTE
Group Therapy Note    Date: Susana 3    Group Start Time: 2115  Group End Time: 2145  Group Topic: Relaxation    [facility]  [address], RN        Group Therapy Note    Attendees: 14

## 2019-06-05 LAB
METER GLUCOSE: 128 MG/DL (ref 74–99)
METER GLUCOSE: 137 MG/DL (ref 74–99)

## 2019-06-05 PROCEDURE — 6370000000 HC RX 637 (ALT 250 FOR IP): Performed by: NURSE PRACTITIONER

## 2019-06-05 PROCEDURE — 82962 GLUCOSE BLOOD TEST: CPT

## 2019-06-05 PROCEDURE — 99231 SBSQ HOSP IP/OBS SF/LOW 25: CPT | Performed by: NURSE PRACTITIONER

## 2019-06-05 PROCEDURE — 1240000000 HC EMOTIONAL WELLNESS R&B

## 2019-06-05 PROCEDURE — 6370000000 HC RX 637 (ALT 250 FOR IP): Performed by: PSYCHIATRY & NEUROLOGY

## 2019-06-05 RX ADMIN — GLIMEPIRIDE 4 MG: 4 TABLET ORAL at 09:08

## 2019-06-05 RX ADMIN — ATORVASTATIN CALCIUM 40 MG: 10 TABLET, FILM COATED ORAL at 09:08

## 2019-06-05 RX ADMIN — METFORMIN HYDROCHLORIDE 1000 MG: 1000 TABLET ORAL at 16:45

## 2019-06-05 RX ADMIN — DIVALPROEX SODIUM 1000 MG: 500 TABLET, DELAYED RELEASE ORAL at 09:08

## 2019-06-05 RX ADMIN — METFORMIN HYDROCHLORIDE 1000 MG: 1000 TABLET ORAL at 09:08

## 2019-06-05 RX ADMIN — CETIRIZINE HYDROCHLORIDE 10 MG: 10 TABLET, FILM COATED ORAL at 09:08

## 2019-06-05 RX ADMIN — CARVEDILOL 12.5 MG: 6.25 TABLET, FILM COATED ORAL at 09:08

## 2019-06-05 RX ADMIN — CARVEDILOL 12.5 MG: 6.25 TABLET, FILM COATED ORAL at 16:45

## 2019-06-05 RX ADMIN — DEXLANSOPRAZOLE 60 MG: 60 CAPSULE, DELAYED RELEASE ORAL at 09:09

## 2019-06-05 RX ADMIN — DIVALPROEX SODIUM 1000 MG: 500 TABLET, DELAYED RELEASE ORAL at 20:25

## 2019-06-05 RX ADMIN — SPIRONOLACTONE 50 MG: 25 TABLET ORAL at 09:08

## 2019-06-05 RX ADMIN — LINAGLIPTIN 5 MG: 5 TABLET, FILM COATED ORAL at 09:08

## 2019-06-05 RX ADMIN — PALIPERIDONE 12 MG: 6 TABLET, EXTENDED RELEASE ORAL at 09:08

## 2019-06-05 RX ADMIN — TRAZODONE HYDROCHLORIDE 50 MG: 50 TABLET ORAL at 20:25

## 2019-06-05 ASSESSMENT — PAIN SCALES - GENERAL
PAINLEVEL_OUTOF10: 0
PAINLEVEL_OUTOF10: 0

## 2019-06-05 NOTE — PROGRESS NOTES
PT. REMAINS ON COURT ORDER. PT. REMAINS PREOCCUPIED AND REPORTS HEARING VOICES AND NOISES, BUT DENIES HALLUCINATIONS ARE COMMAND IN TYPE. MISINTERPRETS EXPLANATIONS AND INTENTIONS OF OTHERS,STAFF. REPORTS WILL DECLINE SECOND LONG ACTING INJECTION AND STATED WILL TAKE ORAL MEDICATION INSTEAD OF MONTHLY INJECTION.

## 2019-06-05 NOTE — PROGRESS NOTES
DATE OF SERVICE:     6/5/2019    Mila Andrade [place] seen today for the purpose of continuation of care. Nursing, social work reports, laboratory studies and vital signs are reviewed. Patient chief complaint today is:             [x] Depression      [x] Anxiety        [x] Psychosis         [] Suicidal/Homicidal                         [x] Delusions           [] Aggression          Subjective: Today patient is pleasant and cooperative, states he has AVH today, but states they are still getting better. Patient took [place], is not wanting second shot. Patient is not sleeping and does not want anything to help him. Patient is medication compliant. Sleep:  [] Good [] Fair  [x] Poor  Appetite:  [] Good [x] Fair  [] Poor    Depression:  [] Mild [] Moderate [x] Severe                [x] Constant [] Sporadic     Anxiety: [] Mild [] Moderate [x] Severe    [x] Constant [] Sporadic     Delusions: [] Mild [x] Moderate [] Severe     [] Constant [x] Sporadic     [x] Paranoid [] Somatic [] Grandiose     Hallucinations: [] Mild [x] Moderate [] Severe     [] Constant [x] Sporadic    [x] Auditory  [x] Visual [] Tactile       Suicidal: [] Constant [] Sporadic  Homicidal: [] Constant [] Sporadic    Unscheduled Medications     [] Patient Receiving Emergency Medications \" Chemical Restraint\"   [] Requesting PRN medications for anxiety    Medical Review of Systems:     All other than marked systmes have been reviewed and are all negative.     Constitutional Symptoms: []  fever []  Chills  Skin Symptoms: [] rash []  Pruritus   Eye Symptoms: [] Vision unchanged []  recent vision problems[] blurred vision   Respiratory Symptoms:[] shortness of breath [] cough  Cardiovascular Symptoms:  [] chest pain   [] palpitations   Gastrointestinal Symptoms: []  abdominal pain []  nausea []  vomiting []  diarrhea  Genitourinary Symptoms: []  dysuria  []  hematuria   Musculoskeletal Symptoms: []  back pain []  muscle pain []  joint

## 2019-06-05 NOTE — PLAN OF CARE
Patient has been up on and off thus far this shift. Patient has remained in control and has not had any outbursts thus far. No signs of distress or discomfort noted. No PRN medications given thus far. Safety needs met. No unit problems reported. Will continue to observe and support.      Problem: Altered Mood, Depressive Behavior:  Goal: Able to verbalize acceptance of life and situations over which he or she has no control  Description  Able to verbalize acceptance of life and situations over which he or she has no control  6/5/2019 0344 by Andra Ashford RN  Outcome: Ongoing     Problem: Altered Mood, Depressive Behavior:  Goal: Able to verbalize and/or display a decrease in depressive symptoms  Description  Able to verbalize and/or display a decrease in depressive symptoms  6/5/2019 0344 by Andra Ashford RN  Outcome: Ongoing

## 2019-06-05 NOTE — PLAN OF CARE
Problem: Altered Mood, Depressive Behavior:  Goal: Able to verbalize acceptance of life and situations over which he or she has no control  Description  Able to verbalize acceptance of life and situations over which he or she has no control  6/5/2019 1705 by Charissa Malone RN  Outcome: Ongoing  6/5/2019 0344 by Golden Vazquez RN  Outcome: Ongoing  Goal: Able to verbalize and/or display a decrease in depressive symptoms  Description  Able to verbalize and/or display a decrease in depressive symptoms  6/5/2019 1705 by Charissa Malone RN  Outcome: Ongoing  6/5/2019 0344 by Golden Vazquez RN  Outcome: Ongoing     Problem: Altered Mood, Psychotic Behavior:  Goal: Able to verbalize decrease in frequency and intensity of hallucinations  Description  Able to verbalize decrease in frequency and intensity of hallucinations  6/5/2019 1705 by Charissa Malone RN  Outcome: Ongoing  6/5/2019 0342 by Golden Vazquez RN  Outcome: Ongoing  Goal: Able to verbalize reality based thinking  Description  Able to verbalize reality based thinking  6/5/2019 1705 by Charissa Malone RN  Outcome: Ongoing  6/5/2019 0342 by Golden Vazquez RN  Outcome: Ongoing     Problem: Falls - Risk of:  Goal: Will remain free from falls  Description  Will remain free from falls  Outcome: Met This Shift  Goal: Absence of physical injury  Description  Absence of physical injury  Outcome: Met This Shift       Pt denies suicidal ideations and homicidal ideations. Pt positive for voices that are degrading. Pt smiling upon assessment. Poor eye contact. Pt flat and depressed. Evasive. Will continue to monitor.

## 2019-06-06 LAB
METER GLUCOSE: 91 MG/DL (ref 74–99)
METER GLUCOSE: 99 MG/DL (ref 74–99)

## 2019-06-06 PROCEDURE — 99231 SBSQ HOSP IP/OBS SF/LOW 25: CPT | Performed by: NURSE PRACTITIONER

## 2019-06-06 PROCEDURE — 1240000000 HC EMOTIONAL WELLNESS R&B

## 2019-06-06 PROCEDURE — 6370000000 HC RX 637 (ALT 250 FOR IP): Performed by: PSYCHIATRY & NEUROLOGY

## 2019-06-06 PROCEDURE — 82962 GLUCOSE BLOOD TEST: CPT

## 2019-06-06 PROCEDURE — 6370000000 HC RX 637 (ALT 250 FOR IP): Performed by: NURSE PRACTITIONER

## 2019-06-06 RX ADMIN — CARVEDILOL 12.5 MG: 6.25 TABLET, FILM COATED ORAL at 09:09

## 2019-06-06 RX ADMIN — SPIRONOLACTONE 50 MG: 25 TABLET ORAL at 09:08

## 2019-06-06 RX ADMIN — DIVALPROEX SODIUM 1000 MG: 500 TABLET, DELAYED RELEASE ORAL at 09:08

## 2019-06-06 RX ADMIN — PALIPERIDONE 12 MG: 6 TABLET, EXTENDED RELEASE ORAL at 09:09

## 2019-06-06 RX ADMIN — ATORVASTATIN CALCIUM 40 MG: 10 TABLET, FILM COATED ORAL at 09:09

## 2019-06-06 RX ADMIN — GLIMEPIRIDE 4 MG: 4 TABLET ORAL at 09:09

## 2019-06-06 RX ADMIN — DIVALPROEX SODIUM 1000 MG: 500 TABLET, DELAYED RELEASE ORAL at 21:20

## 2019-06-06 RX ADMIN — CETIRIZINE HYDROCHLORIDE 10 MG: 10 TABLET, FILM COATED ORAL at 09:08

## 2019-06-06 RX ADMIN — METFORMIN HYDROCHLORIDE 1000 MG: 1000 TABLET ORAL at 09:09

## 2019-06-06 RX ADMIN — LINAGLIPTIN 5 MG: 5 TABLET, FILM COATED ORAL at 09:09

## 2019-06-06 RX ADMIN — CARVEDILOL 12.5 MG: 6.25 TABLET, FILM COATED ORAL at 16:30

## 2019-06-06 RX ADMIN — TRAZODONE HYDROCHLORIDE 50 MG: 50 TABLET ORAL at 21:21

## 2019-06-06 RX ADMIN — METFORMIN HYDROCHLORIDE 1000 MG: 1000 TABLET ORAL at 17:08

## 2019-06-06 ASSESSMENT — PAIN SCALES - GENERAL: PAINLEVEL_OUTOF10: 0

## 2019-06-06 NOTE — GROUP NOTE
Group Therapy Note    Date: June 6    Group Start Time: 1000  Group End Time: 1045  Group Topic: Psychoeducation    [facility]    Shilpa Melendez, CTRS        Group Therapy Note  Number of participants: 11  Type of group: Psychoeducation  Mode of intervention: Education, Support, Socialization, Exploration, Clarifying and Problem-solving  Topic: Overcoming Avoidance   Objective:  Patient will identify ways to overcome avoidance in recovery utilizing the FACE acronym. Notes:  Patient was interactive during group sharing ways to overcome avoidance in recovery utilizing the FACE acronym. Patient gave support and feedback to others. Status After Intervention:  Improved    Participation Level:  Active Listener and Interactive    Participation Quality: Appropriate, Attentive, Sharing and Supportive      Speech:  normal      Thought Process/Content: Logical      Affective Functioning: Congruent      Mood: euthymic      Level of consciousness:  Alert, Oriented x4 and Attentive      Response to Learning: Able to verbalize current knowledge/experience, Able to verbalize/acknowledge new learning, Able to retain information, Capable of insight, Able to change behavior and Progressing to goal      Endings: None Reported    Modes of Intervention: Education, Support, Socialization, Exploration, Clarifying and Problem-solving

## 2019-06-06 NOTE — GROUP NOTE
Group Therapy Note    Date: June 5    Group Start Time: 1900  Group End Time: 2000  Group Topic: Healthy Living/Wellness    [facility]    Maile Rebollar RN        Group Therapy Note    Attendees: 17  Patient attended and participated in Wellness Group.         Signature:  Maile Rebollar RN

## 2019-06-06 NOTE — PLAN OF CARE
Problem: Altered Mood, Psychotic Behavior:  Goal: Able to verbalize decrease in frequency and intensity of hallucinations  Description  Able to verbalize decrease in frequency and intensity of hallucinations  6/6/2019 1049 by Winsome Velasco RN  Outcome: Met This Shift    PT.  REPORTS A DECREASE IN AUDITORY HALLUCINATIONS.  6/6/2019 1021 by Winsome Velasco RN  Outcome: Ongoing     Problem: Altered Mood, Psychotic Behavior:  Goal: Able to verbalize reality based thinking  Description  Able to verbalize reality based thinking  6/6/2019 1049 by Winsome Velasco RN  Outcome: Ongoing    NO VOICED DELUSIONS ON A.M. ASSESSMENT.  6/6/2019 1021 by Winsome Velasco RN  Outcome: Met This Shift  6/6/2019 0412 by Kyara Tyson RN  Outcome: Ongoing

## 2019-06-06 NOTE — GROUP NOTE
Group Therapy Note    Date: June 5    Group Start Time: 2000  Group End Time: 2030  Group Topic: Wrap-Up    [facility]  1    Fatemeh Otero RN        Group Therapy Note    Attendees: 17  Patient attended [address] [place] [address] group.           Signature:  Fatemeh Otero RN

## 2019-06-06 NOTE — PLAN OF CARE
Problem: Altered Mood, Psychotic Behavior:  Goal: Able to verbalize reality based thinking  Description  Able to verbalize reality based thinking  6/6/2019 1021 by Tremayne Davila RN  Outcome: Met This Shift    NO VOICED DELUSIONS.  6/6/2019 0412 by Alena Shea RN  Outcome: Ongoing     Problem: Altered Mood, Psychotic Behavior:  Goal: Able to verbalize decrease in frequency and intensity of hallucinations  Description  Able to verbalize decrease in frequency and intensity of hallucinations  Outcome: Ongoing

## 2019-06-06 NOTE — PROGRESS NOTES
PT. STATED VERBAL AGREEMENT TO TAKE LONG ACTING INJECTION TOMORROW. PT. REMAINS GUARDED, WATCHFUL AND HESITANT, BUT HAS BEEN IN GOOD CONTROL AND REPORTS LESSENING OF NON COMMAND AUDITORY HALLUCINATIONS. PT. IS HESITANT IN RESPONSE,THOUGHT BLOCKING AT INTERVALS. REPORTS  CONCERN OVER NOT BEING ABLE TO ATTEND GRANDMOTHER'S  ON Saturday. PT. REMAINS MEDICATION COMPLIANT AND ATTENDS SELECTIVE GROUPS.

## 2019-06-06 NOTE — PLAN OF CARE
Pt frequently awake, but remained in bed, at HS q 15 min electronic rounding. Pt subdued, low energy level. RN hourly rounds provided to pt this shift.

## 2019-06-06 NOTE — PROGRESS NOTES
DATE OF SERVICE:     6/6/2019    Ashlie Gipson seen today for the purpose of continuation of care. Nursing, social work reports, laboratory studies and vital signs are reviewed. Patient chief complaint today is:             [x] Depression      [x] Anxiety        [x] Psychosis         [] Suicidal/Homicidal                         [x] Delusions           [] Aggression          Subjective: Today patient is pleasant and cooperative, is agreeable to take Taty Gilford tomorrow. Possible d/c tomorrow. Patient is medication compliant. Sleep:  [] Good [] Fair  [x] Poor  Appetite:  [] Good [x] Fair  [] Poor    Depression:  [] Mild [] Moderate [x] Severe                [x] Constant [] Sporadic     Anxiety: [] Mild [] Moderate [x] Severe    [x] Constant [] Sporadic     Delusions: [] Mild [x] Moderate [] Severe     [] Constant [x] Sporadic     [x] Paranoid [] Somatic [] Grandiose     Hallucinations: [x] Mild [] Moderate [] Severe     [] Constant [x] Sporadic    [x] Auditory  [] Visual [] Tactile       Suicidal: [] Constant [] Sporadic  Homicidal: [] Constant [] Sporadic    Unscheduled Medications     [] Patient Receiving Emergency Medications \" Chemical Restraint\"   [] Requesting PRN medications for anxiety    Medical Review of Systems:     All other than marked systmes have been reviewed and are all negative.     Constitutional Symptoms: []  fever []  Chills  Skin Symptoms: [] rash []  Pruritus   Eye Symptoms: [] Vision unchanged []  recent vision problems[] blurred vision   Respiratory Symptoms:[] shortness of breath [] cough  Cardiovascular Symptoms:  [] chest pain   [] palpitations   Gastrointestinal Symptoms: []  abdominal pain []  nausea []  vomiting []  diarrhea  Genitourinary Symptoms: []  dysuria  []  hematuria   Musculoskeletal Symptoms: []  back pain []  muscle pain []  joint pain  Neurologic Symptoms: []  headache []  dizziness  Hematolymphoid Symptoms: [] Adenopathy [] Bruises   [] Schimosis Psychiatric Review of systems  Delusions:  [] Denies [] Endorses   Withdrawals:  [] Denies [] Endorses    Hallucinations: [] Denies [] Endorses    Extra Pyramidal Symptoms: [] Denies [] Endorses      /79   Pulse 95   Temp 98 °F (36.7 °C) (Oral)   Resp 18   Ht 5' 9\" (1.753 m)   Wt 270 lb (122.5 kg)   SpO2 97%   BMI 39.87 kg/m²     Mental Status Examination:    Cognition:      [x] Alert  [x] Awake  [x] Oriented  [x] Person  [x] Place [x] Time      [] drowsy  [] tired  [] lethargic  [] distractable  [] Other    Attention/Concentration:   [x] Attentive  [] Distracted        Memory Recent and Remote: [x] Intact   [] Impaired [] Partially Impaired     Language: [] Able to recognize and name objects          [] Unable to recognize and name Objects    Fund of Knowledge:  [] Poor [x]  Fair  [] Good    Speech: [] Normal  [x] Soft  [] Slow  [] Fast [] Pressured            [] Loud [] Dysarthria  [] Incoherent    Appearance: [] Well Groomed  [x] Casual Dressed  [] Unkept  [] Disheveled          [] Normal weight[] Thin  [x] Overweight  [] Obese           Attitude: [] Positive  [] Hostile  [] Demanding  [] Guarded  [] Defensive         [x] Cooperative  []  Uncooperative      Behavior:  [x] Normal Gait  [] Walks with Assistance  [] Vicki Chair     [] Walks with Milka Oas  [] In Hospital Bed  [] Sitting in Chair    Muscle-Skeletal:  [x] Normal Muscle Tone [] Muscle Atrophy       [] Abnormal Muscle Movement     Eye Contact:  [x] Good eye contact  [] Intermittent Eye Contact  [] Poor Eye Contact        [] Excessive Eye Contact   [] Intrusive    Mood: [] Depressed  [x] Anxious  [] Irritated  [] Euthymic   [] Angry [] Restless                    [] Apathetic    Affect:  [x] Congruent  [] Incongruent  [] Labile  [] Constricted  [] Flat  [] Bizarre                     [] Heightened  [] Exaggerated      Thought Process and Association:  [] Logical [] Illogical       [x] Linear and Goal Directed  [] Tangential  [] Circumstantial     Thought Content:  [] Denies [] Endorses [] Suicidal [] Homicidal  [x] Delusional      [x] Paranoid  [] Somatic  [] Grandiose    Perception: []  None  [x] Auditory   [] Visual  [] tactile   [] olfactory  [] Illusions         Insight: [] Intact  [] Fair  [x] Limited    Judgement:  [] Intact  [] Fair  [x] Limited      Assessment/Plan:        Patient Active Problem List   Diagnosis Code    Schizoaffective disorder, chronic condition with acute exacerbation ([facility] [address] F25.8    Schizophrenia F20.9    Chest pain R07.9    Cardiomyopathy, dilated, nonischemic (HCC) I42.0    H/O CHF Z86.79    Automatic implantable cardioverter-defibrillator in situ Z95.810    HTN (hypertension) I10    DM (diabetes mellitus) ([facility] [address] E11.9    Morbid obesity due to excess calories ([facility] [address] E66.01    RIKKI (obstructive sleep apnea) G47.33    GERD (gastroesophageal reflux disease) K21.9    Hiatal hernia K44.9    Tobacco abuse Z72.0    Hyperlipemia E78.5    Paranoid schizophrenia ([facility] [address] F20.0    Type 2 diabetes mellitus, without long-term current use of insulin ([facility] [address] E11.9         Plan:    []  Patient is refusing medications  [x] Improving as expected   [] Not improving as expected   [] Worsening    []  At Baseline     Will continue current medications, will order Invega Sustenna 156 mg    Reason for more than one antipsychotic:  [x] N/A  [] 3 failed monotherapy(drugs tried):  [] Cross over to a new antipsychotic  [] Taper to monotherapy from polypharmacy  [] Augmentation of Clozapine therapy due to treatment resistance to single therapy      Signed:  Viviana Torres  6/6/2019  10:43 AM

## 2019-06-06 NOTE — CARE COORDINATION
Discussed plan of care in treatment team    Pt is probated but per Zenobia Walsh pt does not have to step down to [facility] upon d/c    Plan for pt to have second injection and possible d/c tomorrow 6/7    Phone call to mom to update her on plan of care and progress. She feels comfortable with him d/cing home after 2nd shot. He lives in own home, she will monitor medication for pt.

## 2019-06-06 NOTE — CARE COORDINATION
Mele contacted [facility] for D/C services.  Mele set up apt for counseling with Dorothy Jensen on Wednesday, June 12th at noon, and an apt with Dr. Susan Saucedo for medication on Monday, July 8th, at 11:40 AM.      YESICA Rodgers student

## 2019-06-06 NOTE — PLAN OF CARE
Problem: Altered Mood, Depressive Behavior:  Goal: Able to verbalize and/or display a decrease in depressive symptoms  Description  Able to verbalize and/or display a decrease in depressive symptoms  Outcome: Ongoing     Problem: Altered Mood, Psychotic Behavior:  Goal: Able to verbalize decrease in frequency and intensity of hallucinations  Description  Able to verbalize decrease in frequency and intensity of hallucinations  6/6/2019 1850 by Adam Yao RN  Outcome: Ongoing   Pt is withdrawn to his room and awake in bed. Is cooperative in interacting but affect is blunted and speech monotone. Responses continue to be delayed. Denies any SI and HI but states that at times he still feels like he has to defend himself. Admits to hearing voices that say mean things to him.

## 2019-06-07 VITALS
HEIGHT: 69 IN | TEMPERATURE: 97.2 F | SYSTOLIC BLOOD PRESSURE: 122 MMHG | RESPIRATION RATE: 16 BRPM | DIASTOLIC BLOOD PRESSURE: 72 MMHG | HEART RATE: 80 BPM | OXYGEN SATURATION: 97 % | WEIGHT: 270 LBS | BODY MASS INDEX: 39.99 KG/M2

## 2019-06-07 LAB — METER GLUCOSE: 107 MG/DL (ref 74–99)

## 2019-06-07 PROCEDURE — 82962 GLUCOSE BLOOD TEST: CPT

## 2019-06-07 PROCEDURE — 6370000000 HC RX 637 (ALT 250 FOR IP): Performed by: NURSE PRACTITIONER

## 2019-06-07 PROCEDURE — 99238 HOSP IP/OBS DSCHRG MGMT 30/<: CPT | Performed by: NURSE PRACTITIONER

## 2019-06-07 RX ORDER — DIVALPROEX SODIUM 500 MG/1
1000 TABLET, DELAYED RELEASE ORAL EVERY 12 HOURS SCHEDULED
Qty: 120 TABLET | Refills: 0 | Status: ON HOLD
Start: 2019-06-07 | End: 2019-07-14 | Stop reason: HOSPADM

## 2019-06-07 RX ORDER — NICOTINE 21 MG/24HR
1 PATCH, TRANSDERMAL 24 HOURS TRANSDERMAL DAILY
Qty: 30 PATCH | Refills: 0 | Status: SHIPPED | OUTPATIENT
Start: 2019-06-07 | End: 2021-06-11

## 2019-06-07 RX ADMIN — DIVALPROEX SODIUM 1000 MG: 500 TABLET, DELAYED RELEASE ORAL at 08:44

## 2019-06-07 RX ADMIN — ATORVASTATIN CALCIUM 40 MG: 10 TABLET, FILM COATED ORAL at 08:44

## 2019-06-07 RX ADMIN — METFORMIN HYDROCHLORIDE 1000 MG: 1000 TABLET ORAL at 08:44

## 2019-06-07 RX ADMIN — PALIPERIDONE 12 MG: 6 TABLET, EXTENDED RELEASE ORAL at 08:44

## 2019-06-07 RX ADMIN — SPIRONOLACTONE 50 MG: 25 TABLET ORAL at 08:44

## 2019-06-07 RX ADMIN — CETIRIZINE HYDROCHLORIDE 10 MG: 10 TABLET, FILM COATED ORAL at 08:44

## 2019-06-07 RX ADMIN — GLIMEPIRIDE 4 MG: 4 TABLET ORAL at 08:44

## 2019-06-07 RX ADMIN — LINAGLIPTIN 5 MG: 5 TABLET, FILM COATED ORAL at 08:44

## 2019-06-07 RX ADMIN — CARVEDILOL 12.5 MG: 6.25 TABLET, FILM COATED ORAL at 08:44

## 2019-06-07 NOTE — DISCHARGE SUMMARY
mouth nightly as needed for Sleep                     Psychiatric: Mental Status Examination:    Cognition:      [x] Alert  [x] Awake  [x] Oriented  [x] Person  [x] Place [x] Time    [] drowsy  [] tired  [] lethargic  [] distractable       Attention/Concentration:   [x] Attentive  [] Distracted        Memory Recent and Remote: [x] Intact   [] Impaired [] Partially Impaired     Language: [] Able to recognize and name objects          [] Unable to recognize and name Objects    Fund of Knowledge:  [] Poor []  Fair  [] Good    Speech: [x] Normal  [] Soft  [] Slow  [] Fast [] Pressured            [] Loud [] Dysarthria  [] Incoherent       Appearance: [] Well Groomed  [x] Casual Dressed  [] Unkept  [] Disheveled          [] Normal weight[] Thin  [] Overweight  [] Obese           Attitude: [] Positive  [] Hostile  [] Demanding  [] Guarded  [] Defensive         [x] Cooperative  []  Uncooperative      Behavior:  [x] Normal Gait  [] Walks with Assistance  [] Livia Webb    [] Walks with Marcell Blackwood  [] In Hospital Bed  [] Sitting in Chair    Muscle-Skeletal:  [x] Normal Muscle Tone [] Muscle Atrophy       [] Abnormal Muscle Movement     Eye Contact:  [x] Good eye contact  [] Intermittent Eye Contact  [] Poor Eye Contact     Mood: [] Depressed  [] Anxious  [] Irritated  [x] Euthymic   [] Angry [] Restless    Affect:  [x] Congruent  [] Incongruent  [] Labile  [] Constricted  [] Flat  [] Bizarre     Thought Process and Association:  [] Logical [] Illogical       [x] Linear and Goal Directed  [] Tangential  [] Circumstantial     Thought Content:  [x] Denies [] Endorses [] Suicidal [] Homicidal  [] Delusional      [] Paranoid  [] Somatic  [] Grandiose    Perception: [x]  None  [] Auditory   [] Visual  [] tactile   [] olfactory  [] Illusions         Insight: [] Intact  [x] Fair  [] Limited    Judgement:  [] Intact  [x] Fair  [] Limited    Hospital Course:   Admit Date: 5/28/2019     Discharge Date: 6/7/2019  Admitted from:  [x] Emergency Room  []  Home  []  Another facility   []  [facility]     Admitting diagnosis:   Patient Active Problem List   Diagnosis    Schizoaffective disorder, chronic condition [facility] exacerbation [facility]    Schizophrenia    Chest pain    [facility], [facility]    H/O CHF    Automatic implantable cardioverter-defibrillator in situ    HTN (hypertension)    DM (diabetes mellitus) ([facility]    Morbid obesity due to excess calories ([facility]    RIKKI (obstructive sleep apnea)    GERD (gastroesophageal reflux disease)    Hiatal hernia    Tobacco abuse    Hyperlipemia    Paranoid schizophrenia ([facility]    Type 2 diabetes mellitus, without long-term current use of insulin ([facility]      Length of stay:  10 days              Viviana Gipson was admitted in Psychiatric unit  from ER with psychosis. Patient was treated            With the above . Patient responded well to the treatment. Discharge Summary Plan:     Discharge Status:    [x] Improved [] Unchanged    [] Worse       Discharge instructions given:  [x] Patient    [] Family [] Other         Discharge disposition:  [x] Home [] Step Down unit  [] Group Home []  NH                                                    [] [facility]    [] AMA  [] Other           Prescriptions: Continue same medications, review with patient.        Reason for more than one antipsychotic:  [x] N/A  [] 3 failed monotherapy(drugs tried):  [] Cross over to a new antipsychotic  [] Taper to monotherapy from polypharmacy  [] Augmentation of Clozapine therapy due to treatment resistance to single therapy      Diagnosis:        Patient Active Problem List   Diagnosis Code    Schizoaffective disorder, chronic condition with acute exacerbation ([facility] F25.8    Schizophrenia F20.9    Chest pain R07.9    Cardiomyopathy, dilated, nonischemic ([facility] I42.0    H/O CHF Z86.79    Automatic implantable cardioverter-defibrillator in situ Z95.810    HTN (hypertension) I10    DM (diabetes mellitus) ([facility] E11.9    Morbid obesity due to excess calories (HCC) E66.01    RIKKI (obstructive sleep apnea) G47.33    GERD (gastroesophageal reflux disease) K21.9    Hiatal hernia K44.9    Tobacco abuse Z72.0    Hyperlipemia E78.5    Paranoid schizophrenia ([facility] F20.0    Type 2 diabetes mellitus, without long-term current use of insulin ([facility] E11.9       Education and Follow-up:  Counseled:  [x] Patient     [] Family    [] Guardian      Signed:   Kevin Pro   6/7/2019   9:22 AM

## 2019-06-07 NOTE — PROGRESS NOTES
[facility]  Discharge Note    Pt discharged with followings belongings:   Dentures: None  Vision - Corrective Lenses: Glasses  Hearing Aid: None  Jewelry: None  Body Piercings Removed: N/A  Clothing: Pants, Shirt, Footwear(belt )  Were All Patient Medications Collected?: Not Applicable  Other Valuables: Cell phone, Zeeshan Garcia, [address] sent home with pt. valuables retrieved from and returned to patient. Patient left department with Departure Mode: Family member(mom) via Mobility at Departure: Ambulatory, discharged to Discharged to: Private Residence. Patient education on aftercare instructions: given, along with suicide crisis management plan. Information faxed to Manuel Johnson by . Patient verbalize understanding of AVS:   Yes, along with pt.'s mom, who also stated potential for compliance to same. .    Status EXAM upon discharge:  Status and Exam  Facial Expression: avoids gaze  Affect: blunt  Level of Consciousness: Alert  Mood:Normal: calm  Mood: suspicious  Motor Activity: Decreased  Interview Behavior: Cooperative, Evasive  Preception: [place] to Person, [place] to Place, [place] to Situation  Attention: improved  Thought Processes: slow  Thought Content: Poverty of Content  Hallucinations:  Auditory (Comment)(NON COMMAND)  Delusions: No  Delusions: (None revealed)  Memory: improved  Insight and Judgment:, improving  Present Suicidal Ideation: No  Present Homicidal Ideation: No      Metabolic Screening:    Lab Results   Component Value Date    LABA1C 11.2 (H) 05/30/2019       Lab Results   Component Value Date    CHOL 113 05/30/2019    CHOL 127 09/26/2018    CHOL 138 04/16/2018    CHOL 225 (H) 08/22/2017    CHOL 199 08/12/2013    CHOL 174 07/01/2012     Lab Results   Component Value Date    TRIG 97 05/30/2019    TRIG 150 (H) 09/26/2018    TRIG 100 04/16/2018    TRIG 211 (H) 08/22/2017    TRIG 200 (H) 08/12/2013    TRIG 134 07/01/2012     Lab Results   Component Value Date    HDL 32 05/30/2019    HDL 31 09/26/2018    HDL 42 04/16/2018    HDL 42 08/22/2017    HDL 42.0 08/12/2013    HDL 29.0 (A) 07/01/2012     No components found for: [facility]  Lab Results   Component Value Date    LABVLDL 19 05/30/2019    LABVLDL 30 09/26/2018    LABVLDL 42 08/22/2017       Evan Fink RN

## 2019-06-07 NOTE — CARE COORDINATION
In order to ensure appropriate transition and discharge planning is in place, the following documents have been transmitted to [facility]  as the new outpatient provider:     The d/c diagnosis was transmitted to the next care provider   The reason for hospitalization was transmitted to the next care provider   The d/c medications (dosage and indication) were transmitted to the next care provider    The continuing care plan was transmitted to the next care provider

## 2019-07-08 ENCOUNTER — HOSPITAL ENCOUNTER (INPATIENT)
Age: 39
LOS: 6 days | Discharge: HOME | DRG: 885 | End: 2019-07-14
Attending: EMERGENCY MEDICINE | Admitting: PSYCHIATRY & NEUROLOGY
Payer: MEDICARE

## 2019-07-08 DIAGNOSIS — R45.850 HOMICIDAL THOUGHTS: Primary | ICD-10-CM

## 2019-07-08 PROBLEM — F23 ACUTE PSYCHOSIS (HCC): Status: ACTIVE | Noted: 2019-07-08

## 2019-07-08 LAB
ACETAMINOPHEN LEVEL: <5 MCG/ML (ref 10–30)
ALBUMIN SERPL-MCNC: 3.9 G/DL (ref 3.5–5.2)
ALP BLD-CCNC: 43 U/L (ref 40–129)
ALT SERPL-CCNC: 8 U/L (ref 0–40)
AMPHETAMINE SCREEN, URINE: NOT DETECTED
ANION GAP SERPL CALCULATED.3IONS-SCNC: 9 MMOL/L (ref 7–16)
AST SERPL-CCNC: 16 U/L (ref 0–39)
BARBITURATE SCREEN URINE: NOT DETECTED
BASOPHILS ABSOLUTE: 0.01 E9/L (ref 0–0.2)
BASOPHILS RELATIVE PERCENT: 0.2 % (ref 0–2)
BENZODIAZEPINE SCREEN, URINE: NOT DETECTED
BILIRUB SERPL-MCNC: 0.2 MG/DL (ref 0–1.2)
BILIRUBIN URINE: NEGATIVE
BLOOD, URINE: NEGATIVE
BUN BLDV-MCNC: 10 MG/DL (ref 6–20)
CALCIUM SERPL-MCNC: 9.2 MG/DL (ref 8.6–10.2)
CANNABINOID SCREEN URINE: NOT DETECTED
CHLORIDE BLD-SCNC: 108 MMOL/L (ref 98–107)
CLARITY: CLEAR
CO2: 27 MMOL/L (ref 22–29)
COCAINE METABOLITE SCREEN URINE: NOT DETECTED
COLOR: YELLOW
CREAT SERPL-MCNC: 1.3 MG/DL (ref 0.7–1.2)
EOSINOPHILS ABSOLUTE: 0.01 E9/L (ref 0.05–0.5)
EOSINOPHILS RELATIVE PERCENT: 0.2 % (ref 0–6)
ETHANOL: <10 MG/DL (ref 0–0.08)
GFR AFRICAN AMERICAN: >60
GFR NON-AFRICAN AMERICAN: >60 ML/MIN/1.73
GLUCOSE BLD-MCNC: 60 MG/DL (ref 74–99)
GLUCOSE URINE: NEGATIVE MG/DL
HCT VFR BLD CALC: 35.3 % (ref 37–54)
HEMOGLOBIN: 10.9 G/DL (ref 12.5–16.5)
IMMATURE GRANULOCYTES #: 0.03 E9/L
IMMATURE GRANULOCYTES %: 0.7 % (ref 0–5)
KETONES, URINE: ABNORMAL MG/DL
LEUKOCYTE ESTERASE, URINE: NEGATIVE
LYMPHOCYTES ABSOLUTE: 1.65 E9/L (ref 1.5–4)
LYMPHOCYTES RELATIVE PERCENT: 38.4 % (ref 20–42)
MCH RBC QN AUTO: 26.5 PG (ref 26–35)
MCHC RBC AUTO-ENTMCNC: 30.9 % (ref 32–34.5)
MCV RBC AUTO: 85.9 FL (ref 80–99.9)
METHADONE SCREEN, URINE: NOT DETECTED
MONOCYTES ABSOLUTE: 0.89 E9/L (ref 0.1–0.95)
MONOCYTES RELATIVE PERCENT: 20.7 % (ref 2–12)
NEUTROPHILS ABSOLUTE: 1.71 E9/L (ref 1.8–7.3)
NEUTROPHILS RELATIVE PERCENT: 39.8 % (ref 43–80)
NITRITE, URINE: NEGATIVE
OPIATE SCREEN URINE: NOT DETECTED
PDW BLD-RTO: 15.2 FL (ref 11.5–15)
PH UA: 7.5 (ref 5–9)
PHENCYCLIDINE SCREEN URINE: NOT DETECTED
PLATELET # BLD: 150 E9/L (ref 130–450)
PMV BLD AUTO: 10.3 FL (ref 7–12)
POTASSIUM SERPL-SCNC: 4.3 MMOL/L (ref 3.5–5)
PROPOXYPHENE SCREEN: NOT DETECTED
PROTEIN UA: NEGATIVE MG/DL
RBC # BLD: 4.11 E12/L (ref 3.8–5.8)
SALICYLATE, SERUM: <0.3 MG/DL (ref 0–30)
SODIUM BLD-SCNC: 144 MMOL/L (ref 132–146)
SPECIFIC GRAVITY UA: 1.01 (ref 1–1.03)
TOTAL PROTEIN: 6.6 G/DL (ref 6.4–8.3)
TRICYCLIC ANTIDEPRESSANTS SCREEN SERUM: NEGATIVE NG/ML
TSH SERPL DL<=0.05 MIU/L-ACNC: 0.85 UIU/ML (ref 0.27–4.2)
UROBILINOGEN, URINE: 2 E.U./DL
WBC # BLD: 4.3 E9/L (ref 4.5–11.5)

## 2019-07-08 PROCEDURE — 99285 EMERGENCY DEPT VISIT HI MDM: CPT

## 2019-07-08 PROCEDURE — 85025 COMPLETE CBC W/AUTO DIFF WBC: CPT

## 2019-07-08 PROCEDURE — 80053 COMPREHEN METABOLIC PANEL: CPT

## 2019-07-08 PROCEDURE — 96372 THER/PROPH/DIAG INJ SC/IM: CPT

## 2019-07-08 PROCEDURE — 81003 URINALYSIS AUTO W/O SCOPE: CPT

## 2019-07-08 PROCEDURE — 36415 COLL VENOUS BLD VENIPUNCTURE: CPT

## 2019-07-08 PROCEDURE — 93005 ELECTROCARDIOGRAM TRACING: CPT | Performed by: STUDENT IN AN ORGANIZED HEALTH CARE EDUCATION/TRAINING PROGRAM

## 2019-07-08 PROCEDURE — 80307 DRUG TEST PRSMV CHEM ANLYZR: CPT

## 2019-07-08 PROCEDURE — 6360000002 HC RX W HCPCS: Performed by: EMERGENCY MEDICINE

## 2019-07-08 PROCEDURE — G0480 DRUG TEST DEF 1-7 CLASSES: HCPCS

## 2019-07-08 PROCEDURE — 1240000000 HC EMOTIONAL WELLNESS R&B

## 2019-07-08 PROCEDURE — 2580000003 HC RX 258

## 2019-07-08 PROCEDURE — 84443 ASSAY THYROID STIM HORMONE: CPT

## 2019-07-08 RX ORDER — OLANZAPINE 10 MG/1
10 INJECTION, POWDER, LYOPHILIZED, FOR SOLUTION INTRAMUSCULAR EVERY 4 HOURS PRN
Status: DISCONTINUED | OUTPATIENT
Start: 2019-07-08 | End: 2019-07-14 | Stop reason: HOSPADM

## 2019-07-08 RX ORDER — HYDROXYZINE PAMOATE 50 MG/1
50 CAPSULE ORAL 3 TIMES DAILY PRN
Status: DISCONTINUED | OUTPATIENT
Start: 2019-07-08 | End: 2019-07-14 | Stop reason: HOSPADM

## 2019-07-08 RX ORDER — ACETAMINOPHEN 325 MG/1
650 TABLET ORAL EVERY 4 HOURS PRN
Status: DISCONTINUED | OUTPATIENT
Start: 2019-07-08 | End: 2019-07-14 | Stop reason: HOSPADM

## 2019-07-08 RX ORDER — NICOTINE 21 MG/24HR
1 PATCH, TRANSDERMAL 24 HOURS TRANSDERMAL DAILY
Status: DISCONTINUED | OUTPATIENT
Start: 2019-07-09 | End: 2019-07-14 | Stop reason: HOSPADM

## 2019-07-08 RX ORDER — ZIPRASIDONE MESYLATE 20 MG/ML
20 INJECTION, POWDER, LYOPHILIZED, FOR SOLUTION INTRAMUSCULAR ONCE
Status: COMPLETED | OUTPATIENT
Start: 2019-07-08 | End: 2019-07-08

## 2019-07-08 RX ORDER — TRAZODONE HYDROCHLORIDE 50 MG/1
50 TABLET ORAL NIGHTLY PRN
Status: DISCONTINUED | OUTPATIENT
Start: 2019-07-08 | End: 2019-07-14 | Stop reason: HOSPADM

## 2019-07-08 RX ORDER — OLANZAPINE 5 MG/1
5 TABLET ORAL EVERY 4 HOURS PRN
Status: DISCONTINUED | OUTPATIENT
Start: 2019-07-08 | End: 2019-07-14 | Stop reason: HOSPADM

## 2019-07-08 RX ORDER — BENZTROPINE MESYLATE 1 MG/ML
2 INJECTION INTRAMUSCULAR; INTRAVENOUS 2 TIMES DAILY PRN
Status: DISCONTINUED | OUTPATIENT
Start: 2019-07-08 | End: 2019-07-14 | Stop reason: HOSPADM

## 2019-07-08 RX ORDER — MAGNESIUM HYDROXIDE/ALUMINUM HYDROXICE/SIMETHICONE 120; 1200; 1200 MG/30ML; MG/30ML; MG/30ML
30 SUSPENSION ORAL PRN
Status: DISCONTINUED | OUTPATIENT
Start: 2019-07-08 | End: 2019-07-14 | Stop reason: HOSPADM

## 2019-07-08 RX ADMIN — ZIPRASIDONE MESYLATE 20 MG: 20 INJECTION, POWDER, LYOPHILIZED, FOR SOLUTION INTRAMUSCULAR at 16:25

## 2019-07-08 RX ADMIN — WATER 1.2 ML: 1 INJECTION INTRAMUSCULAR; INTRAVENOUS; SUBCUTANEOUS at 16:25

## 2019-07-08 RX ADMIN — Medication 1.2 ML: at 16:25

## 2019-07-08 ASSESSMENT — ENCOUNTER SYMPTOMS
COUGH: 0
ABDOMINAL PAIN: 0
VOMITING: 0
NAUSEA: 0
CHEST TIGHTNESS: 0
PHOTOPHOBIA: 0
WHEEZING: 0
SHORTNESS OF BREATH: 0

## 2019-07-08 ASSESSMENT — SLEEP AND FATIGUE QUESTIONNAIRES
DO YOU USE A SLEEP AID: NO
AVERAGE NUMBER OF SLEEP HOURS: 5
DO YOU HAVE DIFFICULTY SLEEPING: NO

## 2019-07-08 ASSESSMENT — PATIENT HEALTH QUESTIONNAIRE - PHQ9: SUM OF ALL RESPONSES TO PHQ QUESTIONS 1-9: 3

## 2019-07-08 ASSESSMENT — LIFESTYLE VARIABLES: HISTORY_ALCOHOL_USE: NO

## 2019-07-08 ASSESSMENT — PAIN SCALES - GENERAL: PAINLEVEL_OUTOF10: 1
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
## 2019-07-08 NOTE — ED NOTES
Pt is alert and oriented x4, coooperative. When this RN asked pt what brought him in to the hospital, pt states \"My mother didn't understand me\". Pt denies SI. When asked if he had thoughts of hurting or killing others pt states \"Hell yes I did\". Pt will not elaborate. He states that is all he's going to say. When asked if he lives with his mother pt states \"sorta yeah sorta no\". Pt will not answer to whether or not he has hallucinations. Pt had lunch tray. No other complaints. Ambulates with a steady gait. Will continue to monitor.       Brandon Young RN  07/08/19 0828
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
## 2019-07-09 LAB
EKG ATRIAL RATE: 79 BPM
EKG P AXIS: 58 DEGREES
EKG P-R INTERVAL: 166 MS
EKG Q-T INTERVAL: 372 MS
EKG QRS DURATION: 80 MS
EKG QTC CALCULATION (BAZETT): 426 MS
EKG R AXIS: 26 DEGREES
EKG T AXIS: 24 DEGREES
EKG VENTRICULAR RATE: 79 BPM
VALPROIC ACID LEVEL: 70 MCG/ML (ref 50–100)

## 2019-07-09 PROCEDURE — 80164 ASSAY DIPROPYLACETIC ACD TOT: CPT

## 2019-07-09 PROCEDURE — 6370000000 HC RX 637 (ALT 250 FOR IP): Performed by: NURSE PRACTITIONER

## 2019-07-09 PROCEDURE — 6370000000 HC RX 637 (ALT 250 FOR IP): Performed by: PSYCHIATRY & NEUROLOGY

## 2019-07-09 PROCEDURE — 93010 ELECTROCARDIOGRAM REPORT: CPT | Performed by: INTERNAL MEDICINE

## 2019-07-09 PROCEDURE — 1240000000 HC EMOTIONAL WELLNESS R&B

## 2019-07-09 PROCEDURE — 99221 1ST HOSP IP/OBS SF/LOW 40: CPT | Performed by: NURSE PRACTITIONER

## 2019-07-09 PROCEDURE — 36415 COLL VENOUS BLD VENIPUNCTURE: CPT

## 2019-07-09 RX ORDER — SPIRONOLACTONE 25 MG/1
50 TABLET ORAL DAILY
Status: DISCONTINUED | OUTPATIENT
Start: 2019-07-09 | End: 2019-07-14 | Stop reason: HOSPADM

## 2019-07-09 RX ORDER — GLIMEPIRIDE 4 MG/1
4 TABLET ORAL
Status: DISCONTINUED | OUTPATIENT
Start: 2019-07-09 | End: 2019-07-14 | Stop reason: HOSPADM

## 2019-07-09 RX ORDER — PANTOPRAZOLE SODIUM 40 MG/1
40 TABLET, DELAYED RELEASE ORAL
Status: DISCONTINUED | OUTPATIENT
Start: 2019-07-09 | End: 2019-07-14 | Stop reason: HOSPADM

## 2019-07-09 RX ORDER — ENALAPRIL MALEATE 10 MG/1
10 TABLET ORAL 2 TIMES DAILY
Status: DISCONTINUED | OUTPATIENT
Start: 2019-07-09 | End: 2019-07-14 | Stop reason: HOSPADM

## 2019-07-09 RX ORDER — ALLOPURINOL 100 MG/1
100 TABLET ORAL DAILY
Status: DISCONTINUED | OUTPATIENT
Start: 2019-07-09 | End: 2019-07-14 | Stop reason: HOSPADM

## 2019-07-09 RX ORDER — FUROSEMIDE 40 MG/1
40 TABLET ORAL DAILY
Status: DISCONTINUED | OUTPATIENT
Start: 2019-07-09 | End: 2019-07-14 | Stop reason: HOSPADM

## 2019-07-09 RX ORDER — DIPHENHYDRAMINE HCL 25 MG
25 TABLET ORAL EVERY 6 HOURS PRN
Status: DISCONTINUED | OUTPATIENT
Start: 2019-07-09 | End: 2019-07-14 | Stop reason: HOSPADM

## 2019-07-09 RX ORDER — ATORVASTATIN CALCIUM 10 MG/1
40 TABLET, FILM COATED ORAL DAILY
Status: DISCONTINUED | OUTPATIENT
Start: 2019-07-09 | End: 2019-07-14 | Stop reason: HOSPADM

## 2019-07-09 RX ORDER — DIVALPROEX SODIUM 500 MG/1
1000 TABLET, DELAYED RELEASE ORAL EVERY 12 HOURS SCHEDULED
Status: DISCONTINUED | OUTPATIENT
Start: 2019-07-09 | End: 2019-07-14 | Stop reason: HOSPADM

## 2019-07-09 RX ORDER — CETIRIZINE HYDROCHLORIDE 10 MG/1
10 TABLET ORAL DAILY
Status: DISCONTINUED | OUTPATIENT
Start: 2019-07-09 | End: 2019-07-14 | Stop reason: HOSPADM

## 2019-07-09 RX ORDER — PALIPERIDONE 3 MG/1
3 TABLET, EXTENDED RELEASE ORAL DAILY
Status: DISCONTINUED | OUTPATIENT
Start: 2019-07-09 | End: 2019-07-10

## 2019-07-09 RX ORDER — TRAZODONE HYDROCHLORIDE 50 MG/1
50 TABLET ORAL NIGHTLY PRN
Status: DISCONTINUED | OUTPATIENT
Start: 2019-07-09 | End: 2019-07-09 | Stop reason: SDUPTHER

## 2019-07-09 RX ADMIN — CETIRIZINE HYDROCHLORIDE 10 MG: 10 TABLET, FILM COATED ORAL at 09:24

## 2019-07-09 RX ADMIN — PALIPERIDONE 3 MG: 3 TABLET, EXTENDED RELEASE ORAL at 20:20

## 2019-07-09 RX ADMIN — DIVALPROEX SODIUM 1000 MG: 500 TABLET, DELAYED RELEASE ORAL at 09:24

## 2019-07-09 RX ADMIN — ENALAPRIL MALEATE 10 MG: 10 TABLET ORAL at 10:01

## 2019-07-09 RX ADMIN — ENALAPRIL MALEATE 10 MG: 10 TABLET ORAL at 20:21

## 2019-07-09 RX ADMIN — GLIMEPIRIDE 4 MG: 4 TABLET ORAL at 10:01

## 2019-07-09 RX ADMIN — LINAGLIPTIN 5 MG: 5 TABLET, FILM COATED ORAL at 10:01

## 2019-07-09 RX ADMIN — ATORVASTATIN CALCIUM 40 MG: 10 TABLET, FILM COATED ORAL at 09:24

## 2019-07-09 RX ADMIN — METFORMIN HYDROCHLORIDE 1000 MG: 1000 TABLET ORAL at 10:01

## 2019-07-09 RX ADMIN — ALLOPURINOL 100 MG: 100 TABLET ORAL at 10:01

## 2019-07-09 RX ADMIN — SPIRONOLACTONE 50 MG: 25 TABLET ORAL at 10:01

## 2019-07-09 RX ADMIN — METFORMIN HYDROCHLORIDE 1000 MG: 1000 TABLET ORAL at 16:34

## 2019-07-09 RX ADMIN — DIVALPROEX SODIUM 1000 MG: 500 TABLET, DELAYED RELEASE ORAL at 20:21

## 2019-07-09 RX ADMIN — FUROSEMIDE 40 MG: 40 TABLET ORAL at 09:24

## 2019-07-09 RX ADMIN — PANTOPRAZOLE SODIUM 40 MG: 40 TABLET, DELAYED RELEASE ORAL at 09:24

## 2019-07-09 ASSESSMENT — SLEEP AND FATIGUE QUESTIONNAIRES
DO YOU HAVE DIFFICULTY SLEEPING: YES
RESTFUL SLEEP: NO
SLEEP PATTERN: DIFFICULTY FALLING ASLEEP
DIFFICULTY STAYING ASLEEP: NO
DIFFICULTY FALLING ASLEEP: NO
DO YOU USE A SLEEP AID: YES
DIFFICULTY ARISING: NO

## 2019-07-09 ASSESSMENT — PAIN SCALES - GENERAL
PAINLEVEL_OUTOF10: 0

## 2019-07-09 ASSESSMENT — LIFESTYLE VARIABLES: HISTORY_ALCOHOL_USE: NO

## 2019-07-09 NOTE — H&P
Symptoms: []  headache []  dizziness  Hematolymphoid Symptoms: [] Adenopathy [] Bruises   [] Schimosis       VITALS: /81   Pulse 69   Temp 98.1 °F (36.7 °C) (Oral)   Resp 20   Ht 5' 9\" (1.753 m)   Wt 250 lb (113.4 kg)   SpO2 98%   BMI 36.92 kg/m²     ALLERGIES: Lisinopril            Physical Examination:    Head:  [x] Atraumatic:  [x] normocephalic  Skin and Mucosa       [] Moist [] Dry [] Pale [x] Normal   Neck: [x] Thyroid [] Palpable    [x] Not palpable []  venus distention [] adenopathy   Chest: [x] Clear [] Rhonchi  [] Wheezing   CV: [x] S1 [x] S2 [x] No murmer   Abdomen:  [x] Soft   [] Tender  [] Viceromegaly   Extremities:  [x] No Edema   [] Edema    Cranial Nerves Examination:    CN II: [x] Pupils are reactive to light [] Pupils are non reactive to light  CN III, IV, VI:[x] No eye deviation  [x] No diplopia or ptosis   CN V: [x] Facial Sensation is intact  [] Facial Sensation is not intact   CN IIIV:  [x] Hearing is normal to rubbing fingers   CN IX, X:  [x] Normal gag reflex and phonation   CN XI: [x] Shoulder shrug and neck rotation is normal  CNXII: [x] Tongue is midline no deviation or atrophy       For further PE refer to ED note      MENTAL STATUS EXAM:       Cognition:      [x] Alert  [x] Awake  [x] Oriented  [x] Person  [x] Place [x] Time      [] drowsy  [] tired  [] lethargic  [] distractable     Attention/Concentration:   [x] Attentive  [] Distracted        Memory Recent and Remote: [] Intact   [] Impaired [] Partially Impaired     Language: [] Able to recognize and name objects          [] Unable to recognize and name Objects    Fund of Knowledge:  [] Poor [x]  Fair  [] Good    Speech: [] Normal  [x] Soft  [] Slow  [] Fast [] Pressured            [] Loud [] Dysarthria  [] Incoherent       Appearance: [] Well Groomed  [] Casual Dressed  [] Unkept  [x] Disheveled          [] Normal weight  [] Thin  [x] Overweight  [] Obese           Attitude: [] Positive  [x] Hostile  [] Demanding  [x]

## 2019-07-10 PROCEDURE — 99231 SBSQ HOSP IP/OBS SF/LOW 25: CPT | Performed by: NURSE PRACTITIONER

## 2019-07-10 PROCEDURE — 1240000000 HC EMOTIONAL WELLNESS R&B

## 2019-07-10 PROCEDURE — 6370000000 HC RX 637 (ALT 250 FOR IP): Performed by: NURSE PRACTITIONER

## 2019-07-10 PROCEDURE — 6370000000 HC RX 637 (ALT 250 FOR IP): Performed by: PSYCHIATRY & NEUROLOGY

## 2019-07-10 RX ADMIN — GLIMEPIRIDE 4 MG: 4 TABLET ORAL at 08:49

## 2019-07-10 RX ADMIN — DIVALPROEX SODIUM 1000 MG: 500 TABLET, DELAYED RELEASE ORAL at 20:23

## 2019-07-10 RX ADMIN — METFORMIN HYDROCHLORIDE 1000 MG: 1000 TABLET ORAL at 08:49

## 2019-07-10 RX ADMIN — ENALAPRIL MALEATE 10 MG: 10 TABLET ORAL at 08:49

## 2019-07-10 RX ADMIN — PANTOPRAZOLE SODIUM 40 MG: 40 TABLET, DELAYED RELEASE ORAL at 06:40

## 2019-07-10 RX ADMIN — SPIRONOLACTONE 50 MG: 25 TABLET ORAL at 08:49

## 2019-07-10 RX ADMIN — CETIRIZINE HYDROCHLORIDE 10 MG: 10 TABLET, FILM COATED ORAL at 08:49

## 2019-07-10 RX ADMIN — FUROSEMIDE 40 MG: 40 TABLET ORAL at 09:33

## 2019-07-10 RX ADMIN — ENALAPRIL MALEATE 10 MG: 10 TABLET ORAL at 20:20

## 2019-07-10 RX ADMIN — METFORMIN HYDROCHLORIDE 1000 MG: 1000 TABLET ORAL at 16:41

## 2019-07-10 RX ADMIN — ALLOPURINOL 100 MG: 100 TABLET ORAL at 08:49

## 2019-07-10 RX ADMIN — ATORVASTATIN CALCIUM 40 MG: 10 TABLET, FILM COATED ORAL at 08:49

## 2019-07-10 RX ADMIN — PALIPERIDONE 3 MG: 3 TABLET, EXTENDED RELEASE ORAL at 08:49

## 2019-07-10 RX ADMIN — LINAGLIPTIN 5 MG: 5 TABLET, FILM COATED ORAL at 08:49

## 2019-07-10 RX ADMIN — DIVALPROEX SODIUM 1000 MG: 500 TABLET, DELAYED RELEASE ORAL at 08:49

## 2019-07-10 ASSESSMENT — PAIN SCALES - GENERAL
PAINLEVEL_OUTOF10: 0

## 2019-07-10 NOTE — CARE COORDINATION
Sw spoke to pt's mother Andres at [phone]. She has no concerns with pt retuning home and just wanted to make sure he is doing better.

## 2019-07-10 NOTE — GROUP NOTE
Group Therapy Note    Date: July 10    Group Start Time: 1030  Group End Time: 1115  Group Topic: Psychoeducation    [facility]    Shilpa Melendez, CTRS        Group Therapy Note    Number of participants: 17  Type of group: Psychoeducation  Mode of intervention: Education, Support, Socialization, Exploration, Clarifying and Problem-solving  Topic: SHANTE  Objective:  Patient will identify ways to utilize the [address] in recovery. Notes:   Patient was interactive during group sharing ways to utilize the [address] in recovery. Patient gave support and feedback to others. Accepting of handout and pleasant throughout group. Status After Intervention:  Improved    Participation Level:  Active Listener and Interactive    Participation Quality: Appropriate, Attentive, Sharing and Supportive      Speech:  normal      Thought Process/Content: Logical      Affective Functioning: Congruent      Mood: euthymic      Level of consciousness:  Alert, Oriented x4 and Attentive      Response to Learning: Able to verbalize current knowledge/experience, Able to verbalize/acknowledge new learning, Able to retain information, Capable of insight, Able to change behavior and Progressing to goal      Endings: None Reported    Modes of Intervention: Education, Support, Socialization, Exploration, Clarifying and Problem-solving

## 2019-07-10 NOTE — PROGRESS NOTES
DATE OF SERVICE:     7/10/2019    Hanna Beal [place] seen today for the purpose of continuation of care. Nursing, social work reports, laboratory studies and vital signs are reviewed. Patient chief complaint today is:             [x] Depression      [x] Anxiety        [] Psychosis         [] Suicidal/Homicidal                         [] Delusions           [] Aggression          Subjective: Today patient states that hallucinations and paranoia are improving. Took the Invega FUNES yesterday. Med compliant and going groups. Sleep:  [x] Good [] Fair  [] Poor  Appetite:  [x] Good [] Fair  [] Poor    Depression:  [] Mild [] Moderate [] Severe                [] Constant [] Sporadic     Anxiety: [] Mild [x] Moderate [] Severe    [x] Constant [] Sporadic     Delusions: [] Mild [] Moderate [] Severe     [] Constant [] Sporadic     [] Paranoid [] Somatic [] Grandiose     Hallucinations: [x] Mild [] Moderate [] Severe     [] Constant [x] Sporadic    [x] Auditory  [x] Visual [] Tactile       Suicidal: [] Constant [] Sporadic  Homicidal: [] Constant [] Sporadic    Unscheduled Medications     [] Patient Receiving Emergency Medications \" Chemical Restraint\"   [] Requesting PRN medications for anxiety    Medical Review of Systems:     All other than marked systmes have been reviewed and are all negative.     Constitutional Symptoms: []  fever []  Chills  Skin Symptoms: [] rash []  Pruritus   Eye Symptoms: [] Vision unchanged []  recent vision problems[] blurred vision   Respiratory Symptoms:[] shortness of breath [] cough  Cardiovascular Symptoms:  [] chest pain   [] palpitations   Gastrointestinal Symptoms: []  abdominal pain []  nausea []  vomiting []  diarrhea  Genitourinary Symptoms: []  dysuria  []  hematuria   Musculoskeletal Symptoms: []  back pain []  muscle pain []  joint pain  Neurologic Symptoms: []  headache []  dizziness  Hematolymphoid Symptoms: [] Adenopathy [] Bruises   [] Schimosis       Psychiatric Review of systems  Delusions:  [] Denies [] Endorses   Withdrawals:  [] Denies [] Endorses    Hallucinations: [] Denies [] Endorses    Extra Pyramidal Symptoms: [] Denies [] Endorses      /68   Pulse 80   Temp 98.6 °F (37 °C) (Oral)   Resp 18   Ht 5' 9\" (1.753 m)   Wt 250 lb (113.4 kg)   SpO2 98%   BMI 36.92 kg/m²     MENTAL STATUS EXAM:         Cognition:       [x] Alert  [x] Awake  [x] Oriented  [x] Person  [x] Place [x] Time       [] drowsy  [] tired  [] lethargic  [] distractable      Attention/Concentration:   [x] Attentive  [] Distracted         Memory Recent and Remote: [] Intact   [] Impaired [] Partially Impaired      Language: [] Able to recognize and name objects                         [] Unable to recognize and name [address] [place] [address] of Knowledge:  [] Poor [x]  Fair  [] Good     Speech: [] Normal  [x] Soft  [] Slow  [] Fast [] Pressured                                    [] Loud [] Dysarthria  [] Incoherent        Appearance: [] Well Groomed  [] Casual Dressed  [] Unkept  [x] Disheveled                         [] Normal weight  [] Thin  [x] Overweight  [] Obese           Attitude: [] Positive  [] Hostile  [] Demanding  [x] Guarded  [] Defensive                    [x] Cooperative  []  Uncooperative       Behavior:  [x] Normal Gait  [] Abnormal Gait [] Walks with Assistance  [] Vicki Chair                           [] Walks with Dayanara Moellers  [] In Hospital Bed  [] Sitting in Chair     Muscle-Skeletal:  [x] Normal Muscle Tone [] Muscle Atrophy                                  [] Abnormal Muscle Movement      Eye Contact:   [] Good eye contact  [x] Intermittent Eye Contact  [] Poor Eye Contact               [] Excessive Eye Contact   [] Intrusive Eye Contact     Mood: [] Depressed  [x] Anxious  [] Irritated  [] Euthymic   [] Angry [] Restless                   [] Apathetic     Affect:  [x] Congruent  [] Incongruent  [] Labile  [] Constricted  [x] Flat  [x] Bizarre                     [] Heightened    [] Exaggerated       Thought Process and Association:  [] Logical [] Illogical                                        [x] Linear and Goal Directed  [] Tangential  [] Circumstantial      Thought Content:  [] Denies [] Endorses [] Suicidal [x] Homicidal  [x] Delusional                                       [x] Paranoid  [] Somatic  [] Grandiose     Perception: []  None  [x] Auditory   [] Visual  [] tactile   [] olfactory  [] Illusions          Insight: [] Intact  [] Fair  [x] Limited    Judgement:  [] Intact  [] Fair  [x] Limited           Assessment/Plan:        Patient Active Problem List   Diagnosis Code    Schizoaffective disorder, chronic condition with acute exacerbation (HCC) F25.8    Schizophrenia F20.9    Chest pain R07.9    Cardiomyopathy, dilated, nonischemic (HCC) I42.0    H/O CHF Z86.79    Automatic implantable cardioverter-defibrillator in situ Z95.810    HTN (hypertension) I10    DM (diabetes mellitus) ([facility] E11.9    Morbid obesity due to excess calories ([facility] E66.01    RIKKI (obstructive sleep apnea) G47.33    GERD (gastroesophageal reflux disease) K21.9    Hiatal hernia K44.9    Tobacco abuse Z72.0    Hyperlipemia E78.5    Paranoid schizophrenia ([facility] F20.0    Type 2 diabetes mellitus, without long-term current use of insulin ([facility] E11.9    Acute psychosis ([facility] F23         Plan:    []  Patient is refusing medications  [x] Improving as expected   [] Not improving as expected   [] Worsening    []  At Baseline       D/c oral Invega      Reason for more than one antipsychotic:  [x] N/A  [] 3 failed monotherapy(drugs tried):  [] Cross over to a new antipsychotic  [] Taper to monotherapy from polypharmacy  [] Augmentation of Clozapine therapy due to treatment resistance to single therapy      Signed:  Fili Polo  7/10/2019  8:39 AM

## 2019-07-11 PROCEDURE — 6370000000 HC RX 637 (ALT 250 FOR IP): Performed by: PSYCHIATRY & NEUROLOGY

## 2019-07-11 PROCEDURE — 1240000000 HC EMOTIONAL WELLNESS R&B

## 2019-07-11 PROCEDURE — 6370000000 HC RX 637 (ALT 250 FOR IP): Performed by: NURSE PRACTITIONER

## 2019-07-11 PROCEDURE — 99231 SBSQ HOSP IP/OBS SF/LOW 25: CPT | Performed by: NURSE PRACTITIONER

## 2019-07-11 RX ADMIN — ALUMINUM HYDROXIDE, MAGNESIUM HYDROXIDE, AND SIMETHICONE 30 ML: 200; 200; 20 SUSPENSION ORAL at 10:47

## 2019-07-11 RX ADMIN — SPIRONOLACTONE 50 MG: 25 TABLET ORAL at 09:31

## 2019-07-11 RX ADMIN — ATORVASTATIN CALCIUM 40 MG: 10 TABLET, FILM COATED ORAL at 09:31

## 2019-07-11 RX ADMIN — METFORMIN HYDROCHLORIDE 1000 MG: 1000 TABLET ORAL at 09:32

## 2019-07-11 RX ADMIN — PANTOPRAZOLE SODIUM 40 MG: 40 TABLET, DELAYED RELEASE ORAL at 09:31

## 2019-07-11 RX ADMIN — METFORMIN HYDROCHLORIDE 1000 MG: 1000 TABLET ORAL at 17:59

## 2019-07-11 RX ADMIN — DIVALPROEX SODIUM 1000 MG: 500 TABLET, DELAYED RELEASE ORAL at 20:37

## 2019-07-11 RX ADMIN — TRAZODONE HYDROCHLORIDE 50 MG: 50 TABLET ORAL at 20:37

## 2019-07-11 RX ADMIN — CETIRIZINE HYDROCHLORIDE 10 MG: 10 TABLET, FILM COATED ORAL at 09:32

## 2019-07-11 RX ADMIN — DIVALPROEX SODIUM 1000 MG: 500 TABLET, DELAYED RELEASE ORAL at 09:37

## 2019-07-11 RX ADMIN — FUROSEMIDE 40 MG: 40 TABLET ORAL at 09:32

## 2019-07-11 RX ADMIN — GLIMEPIRIDE 4 MG: 4 TABLET ORAL at 09:32

## 2019-07-11 RX ADMIN — ENALAPRIL MALEATE 10 MG: 10 TABLET ORAL at 20:37

## 2019-07-11 RX ADMIN — LINAGLIPTIN 5 MG: 5 TABLET, FILM COATED ORAL at 09:31

## 2019-07-11 RX ADMIN — ALLOPURINOL 100 MG: 100 TABLET ORAL at 09:32

## 2019-07-11 RX ADMIN — ENALAPRIL MALEATE 10 MG: 10 TABLET ORAL at 09:32

## 2019-07-11 ASSESSMENT — PAIN SCALES - GENERAL: PAINLEVEL_OUTOF10: 0

## 2019-07-11 ASSESSMENT — PAIN - FUNCTIONAL ASSESSMENT: PAIN_FUNCTIONAL_ASSESSMENT: 0-10

## 2019-07-11 NOTE — GROUP NOTE
Group Therapy Note    Date: July 10    Group Start Time: 1925  Group End Time: 1955  Group Topic: Wrap-Up    [facility]    Theresa Bueno RN    Patient attended and participated in evening wrap-up and evening Healthy Living/Wellness groups    Group Therapy Note    Attendees: 18/24           Signature:  Theresa Bueno RN

## 2019-07-11 NOTE — PROGRESS NOTES
systems  Delusions:  [] Denies [] Endorses   Withdrawals:  [] Denies [] Endorses    Hallucinations: [] Denies [] Endorses    Extra Pyramidal Symptoms: [] Denies [] Endorses      /74   Pulse 80   Temp 98.6 °F (37 °C) (Oral)   Resp 18   Ht 5' 9\" (1.753 m)   Wt 250 lb (113.4 kg)   SpO2 98%   BMI 36.92 kg/m²     MENTAL STATUS EXAM:         Cognition:       [x] Alert  [x] Awake  [x] Oriented  [x] Person  [x] Place [x] Time       [] drowsy  [] tired  [] lethargic  [] distractable      Attention/Concentration:   [x] Attentive  [] Distracted         Memory Recent and Remote: [] Intact   [] Impaired [] Partially Impaired      Language: [] Able to recognize and name objects                         [] Unable to recognize and name [facility] Knowledge:  [] Poor [x]  Fair  [] Good     Speech: [] Normal  [x] Soft  [] Slow  [] Fast [] Pressured                                    [] Loud [] Dysarthria  [] Incoherent        Appearance: [] Well Groomed  [] Casual Dressed  [] Unkept  [x] Disheveled                         [] Normal weight  [] Thin  [x] Overweight  [] Obese           Attitude: [] Positive  [] Hostile  [] Demanding  [x] Guarded  [] Defensive                    [x] Cooperative  []  Uncooperative       Behavior:  [x] Normal Gait  [] Abnormal Gait [] Walks with Assistance  [] Vicki Chair                           [] Walks with Alana Mcguire  [] In Hospital Bed  [] Sitting in Chair     Muscle-Skeletal:  [x] Normal Muscle Tone [] Muscle Atrophy                                  [] Abnormal Muscle Movement      Eye Contact:   [] Good eye contact  [x] Intermittent Eye Contact  [] Poor Eye Contact               [] Excessive Eye Contact   [] Intrusive Eye Contact     Mood: [] Depressed  [x] Anxious  [] Irritated  [] Euthymic   [] Angry [] Restless                   [] Apathetic     Affect:  [x] Congruent  [] Incongruent  [] Labile  [] Constricted  [x] Flat  [x] Bizarre                     [] Heightened    []

## 2019-07-12 PROCEDURE — 99231 SBSQ HOSP IP/OBS SF/LOW 25: CPT | Performed by: NURSE PRACTITIONER

## 2019-07-12 PROCEDURE — 1240000000 HC EMOTIONAL WELLNESS R&B

## 2019-07-12 PROCEDURE — 6370000000 HC RX 637 (ALT 250 FOR IP): Performed by: NURSE PRACTITIONER

## 2019-07-12 RX ADMIN — FUROSEMIDE 40 MG: 40 TABLET ORAL at 08:56

## 2019-07-12 RX ADMIN — ATORVASTATIN CALCIUM 40 MG: 10 TABLET, FILM COATED ORAL at 08:55

## 2019-07-12 RX ADMIN — METFORMIN HYDROCHLORIDE 1000 MG: 1000 TABLET ORAL at 08:56

## 2019-07-12 RX ADMIN — LINAGLIPTIN 5 MG: 5 TABLET, FILM COATED ORAL at 08:56

## 2019-07-12 RX ADMIN — DIVALPROEX SODIUM 1000 MG: 500 TABLET, DELAYED RELEASE ORAL at 08:55

## 2019-07-12 RX ADMIN — CETIRIZINE HYDROCHLORIDE 10 MG: 10 TABLET, FILM COATED ORAL at 08:56

## 2019-07-12 RX ADMIN — ALLOPURINOL 100 MG: 100 TABLET ORAL at 08:56

## 2019-07-12 RX ADMIN — ENALAPRIL MALEATE 10 MG: 10 TABLET ORAL at 20:25

## 2019-07-12 RX ADMIN — ENALAPRIL MALEATE 10 MG: 10 TABLET ORAL at 08:56

## 2019-07-12 RX ADMIN — GLIMEPIRIDE 4 MG: 4 TABLET ORAL at 08:56

## 2019-07-12 RX ADMIN — PANTOPRAZOLE SODIUM 40 MG: 40 TABLET, DELAYED RELEASE ORAL at 06:45

## 2019-07-12 RX ADMIN — SPIRONOLACTONE 50 MG: 25 TABLET ORAL at 08:56

## 2019-07-12 RX ADMIN — METFORMIN HYDROCHLORIDE 1000 MG: 1000 TABLET ORAL at 17:06

## 2019-07-12 RX ADMIN — DIVALPROEX SODIUM 1000 MG: 500 TABLET, DELAYED RELEASE ORAL at 20:25

## 2019-07-12 ASSESSMENT — PAIN SCALES - GENERAL: PAINLEVEL_OUTOF10: 0

## 2019-07-12 NOTE — PROGRESS NOTES
[] Heightened    [] Exaggerated       Thought Process and Association:  [] Logical [] Illogical                                        [x] Linear and Goal Directed  [] Tangential  [] Circumstantial      Thought Content:  [] Denies [] Endorses [] Suicidal [] Homicidal  [x] Delusional                                       [x] Paranoid  [] Somatic  [] Grandiose     Perception: [x]  None  [] Auditory   [] Visual  [] tactile   [] olfactory  [] Illusions          Insight: [] Intact  [] Fair  [x] Limited    Judgement:  [] Intact  [] Fair  [x] Limited           Assessment/Plan:        Patient Active Problem List   Diagnosis Code    Schizoaffective disorder, chronic condition with acute exacerbation (HCC) F25.8    Schizophrenia F20.9    Chest pain R07.9    Cardiomyopathy, dilated, nonischemic (HCC) I42.0    H/O CHF Z86.79    Automatic implantable cardioverter-defibrillator in situ Z95.810    HTN (hypertension) I10    DM (diabetes mellitus) ([address] E11.9    Morbid obesity due to excess calories ([address] E66.01    RIKKI (obstructive sleep apnea) G47.33    GERD (gastroesophageal reflux disease) K21.9    Hiatal hernia K44.9    Tobacco abuse Z72.0    Hyperlipemia E78.5    Paranoid schizophrenia ([address] F20.0    Type 2 diabetes mellitus, without long-term current use of insulin ([address] E11.9    Acute psychosis ([facility] F23         Plan:    []  Patient is refusing medications  [x] Improving as expected   [] Not improving as expected   [] Worsening    []  At Baseline       Continue current treatment  Collateral information obtained from mom      Reason for more than one antipsychotic:  [x] N/A  [] 3 failed monotherapy(drugs tried):  [] Cross over to a new antipsychotic  [] Taper to monotherapy from polypharmacy  [] Augmentation of Clozapine therapy due to treatment resistance to single therapy      Signed:  Perry Valencia  7/12/2019  8:43 AM

## 2019-07-13 PROCEDURE — 1240000000 HC EMOTIONAL WELLNESS R&B

## 2019-07-13 PROCEDURE — 99231 SBSQ HOSP IP/OBS SF/LOW 25: CPT | Performed by: NURSE PRACTITIONER

## 2019-07-13 PROCEDURE — 6370000000 HC RX 637 (ALT 250 FOR IP): Performed by: NURSE PRACTITIONER

## 2019-07-13 RX ADMIN — ATORVASTATIN CALCIUM 40 MG: 10 TABLET, FILM COATED ORAL at 08:44

## 2019-07-13 RX ADMIN — PANTOPRAZOLE SODIUM 40 MG: 40 TABLET, DELAYED RELEASE ORAL at 06:48

## 2019-07-13 RX ADMIN — SPIRONOLACTONE 50 MG: 25 TABLET ORAL at 08:44

## 2019-07-13 RX ADMIN — LINAGLIPTIN 5 MG: 5 TABLET, FILM COATED ORAL at 08:44

## 2019-07-13 RX ADMIN — ALLOPURINOL 100 MG: 100 TABLET ORAL at 08:44

## 2019-07-13 RX ADMIN — METFORMIN HYDROCHLORIDE 1000 MG: 1000 TABLET ORAL at 08:44

## 2019-07-13 RX ADMIN — DIVALPROEX SODIUM 1000 MG: 500 TABLET, DELAYED RELEASE ORAL at 08:44

## 2019-07-13 RX ADMIN — GLIMEPIRIDE 4 MG: 4 TABLET ORAL at 08:44

## 2019-07-13 RX ADMIN — METFORMIN HYDROCHLORIDE 1000 MG: 1000 TABLET ORAL at 16:35

## 2019-07-13 RX ADMIN — ENALAPRIL MALEATE 10 MG: 10 TABLET ORAL at 08:44

## 2019-07-13 RX ADMIN — FUROSEMIDE 40 MG: 40 TABLET ORAL at 08:44

## 2019-07-13 RX ADMIN — CETIRIZINE HYDROCHLORIDE 10 MG: 10 TABLET, FILM COATED ORAL at 08:44

## 2019-07-13 RX ADMIN — ENALAPRIL MALEATE 10 MG: 10 TABLET ORAL at 20:35

## 2019-07-13 RX ADMIN — DIVALPROEX SODIUM 1000 MG: 500 TABLET, DELAYED RELEASE ORAL at 20:35

## 2019-07-13 ASSESSMENT — PAIN SCALES - GENERAL: PAINLEVEL_OUTOF10: 0

## 2019-07-13 NOTE — PROGRESS NOTES
Incongruent  [] Labile  [] Constricted  [x] Flat  [x] Bizarre                     [] Heightened    [] Exaggerated       Thought Process and Association:  [] Logical [] Illogical                                        [x] Linear and Goal Directed  [] Tangential  [] Circumstantial      Thought Content:  [] Denies [] Endorses [] Suicidal [] Homicidal  [x] Delusional                                       [x] Paranoid  [] Somatic  [] Grandiose     Perception: [x]  None  [] Auditory   [] Visual  [] tactile   [] olfactory  [] Illusions          Insight: [] Intact  [] Fair  [x] Limited    Judgement:  [] Intact  [] Fair  [x] Limited           Assessment/Plan:        Patient Active Problem List   Diagnosis Code    Schizoaffective disorder, chronic condition with acute exacerbation (HCC) F25.8    Schizophrenia F20.9    Chest pain R07.9    Cardiomyopathy, dilated, nonischemic (HCC) I42.0    H/O CHF Z86.79    Automatic implantable cardioverter-defibrillator in situ Z95.810    HTN (hypertension) I10    DM (diabetes mellitus) ([facility] E11.9    Morbid obesity due to excess calories ([address] E66.01    RIKKI (obstructive sleep apnea) G47.33    GERD (gastroesophageal reflux disease) K21.9    Hiatal hernia K44.9    Tobacco abuse Z72.0    Hyperlipemia E78.5    Paranoid schizophrenia ([address] F20.0    Type 2 diabetes mellitus, without long-term current use of insulin ([address] E11.9    Acute psychosis ([facility] [address] F23         Plan:    []  Patient is refusing medications  [x] Improving as expected   [] Not improving as expected   [] Worsening    []  At Baseline       Continue current treatment        Reason for more than one antipsychotic:  [x] N/A  [] 3 failed monotherapy(drugs tried):  [] Cross over to a new antipsychotic  [] Taper to monotherapy from polypharmacy  [] Augmentation of Clozapine therapy due to treatment resistance to single therapy      Signed:  Vishnu Cotton  7/13/2019  8:58 AM

## 2019-07-13 NOTE — PLAN OF CARE
Patient resting quiet to self at this time, respirations are even and unlabored, no signs or symptoms of distress or discomfort. No PRN medications given thus far this shift. Staff will continue to conduct environmental rounds to ensure the safety of everyone on the unit. Staff will provide support and interventions as requested or required.
Problem: Anger Management/Homicidal Ideation:  Goal: Absence of angry outbursts  Description  Absence of angry outbursts  Outcome: Met This Shift
Problem: Depressive Behavior With or Without Suicide Precautions:  Goal: Ability to disclose and discuss suicidal ideas will improve  Description  Ability to disclose and discuss suicidal ideas will improve  Outcome: Met This Shift        Problem: Depressive Behavior With or Without Suicide Precautions:  Goal: Able to verbalize and/or display a decrease in depressive symptoms  Description  Able to verbalize and/or display a decrease in depressive symptoms  Outcome: Ongoing     Problem: Altered Mood, Deterioration in Function:  Goal: Able to verbalize reality based thinking  Description  Able to verbalize reality based thinking  Outcome: Ongoing                Patient still watchful and guarded. Flat and depressed. Repots the auditory hallucination are less. Denies SI and HI. Will monitor.
Alert  Mood:Normal: No  Mood: Sad, Depressed  Motor Activity:Normal: No  Motor Activity: Decreased  Interview Behavior: Cooperative, Evasive  Preception: [place] to Person, Arron Lupe to Time, [place] to Place, [place] to Situation  Attention:Normal: No  Attention: Distractible  Thought Processes: Circumstantial  Thought Content:Normal: No  Thought Content: Paranoia  Hallucinations: Auditory (Comment)(\"just a little bit\")  Delusions: Yes  Delusions:  Other(See Comment)(paranoid)  Memory:Normal: No  Memory: Poor Recent  Insight and Judgment: No  Insight and Judgment: Poor Judgment, Poor Insight  Present Suicidal Ideation: No  Present Homicidal Ideation: No    Daily Assessment Last Entry:   Daily Sleep (WDL): Exceptions to WDL         Patient Currently in Pain: Denies       Patient Monitoring:  Frequency of Checks: 4 times per hour, close    Psychiatric Symptoms:   Depression Symptoms  Depression Symptoms: (jessica pt asleep)  Anxiety Symptoms  Anxiety Symptoms: Generalized  Avelina Symptoms  Avelina Symptoms: Poor judgment     Psychosis Symptoms  Delusion Type: Paranoid    Suicide Risk CSSR-S:  1) Within the past month, have you wished you were dead or wished you could go to sleep and not wake up? : Yes  2) Have you actually had any thoughts of killing yourself? : No  6) Have you ever done anything, started to do anything, or prepared to do anything to end your life?: No  Change in Result no  Change in Plan of care no       EDUCATION:   Learner Progress Toward Treatment Goals: Reviewed results and recommendations of this team    Method: Group    Outcome: Verbalized understanding    PATIENT GOALS:  To rest    PLAN/TREATMENT RECOMMENDATIONS UPDATE: encourage daily goals and groups    GOALS UPDATE:   Time frame for Short-Term Goals:  By discharge      Eden Aviles RN

## 2019-07-14 VITALS
DIASTOLIC BLOOD PRESSURE: 68 MMHG | SYSTOLIC BLOOD PRESSURE: 111 MMHG | RESPIRATION RATE: 16 BRPM | TEMPERATURE: 98 F | HEART RATE: 92 BPM | BODY MASS INDEX: 37.03 KG/M2 | OXYGEN SATURATION: 98 % | HEIGHT: 69 IN | WEIGHT: 250 LBS

## 2019-07-14 PROCEDURE — 99238 HOSP IP/OBS DSCHRG MGMT 30/<: CPT | Performed by: NURSE PRACTITIONER

## 2019-07-14 PROCEDURE — 6370000000 HC RX 637 (ALT 250 FOR IP): Performed by: NURSE PRACTITIONER

## 2019-07-14 RX ORDER — DIVALPROEX SODIUM 500 MG/1
1000 TABLET, DELAYED RELEASE ORAL EVERY 12 HOURS SCHEDULED
Qty: 120 TABLET | Refills: 0 | Status: ON HOLD | OUTPATIENT
Start: 2019-07-14 | End: 2020-07-24 | Stop reason: HOSPADM

## 2019-07-14 RX ADMIN — CETIRIZINE HYDROCHLORIDE 10 MG: 10 TABLET, FILM COATED ORAL at 09:29

## 2019-07-14 RX ADMIN — SPIRONOLACTONE 50 MG: 25 TABLET ORAL at 09:29

## 2019-07-14 RX ADMIN — ATORVASTATIN CALCIUM 40 MG: 10 TABLET, FILM COATED ORAL at 09:29

## 2019-07-14 RX ADMIN — ENALAPRIL MALEATE 10 MG: 10 TABLET ORAL at 09:29

## 2019-07-14 RX ADMIN — METFORMIN HYDROCHLORIDE 1000 MG: 1000 TABLET ORAL at 09:31

## 2019-07-14 RX ADMIN — FUROSEMIDE 40 MG: 40 TABLET ORAL at 09:31

## 2019-07-14 RX ADMIN — DIVALPROEX SODIUM 1000 MG: 500 TABLET, DELAYED RELEASE ORAL at 09:35

## 2019-07-14 RX ADMIN — GLIMEPIRIDE 4 MG: 4 TABLET ORAL at 09:31

## 2019-07-14 RX ADMIN — ALLOPURINOL 100 MG: 100 TABLET ORAL at 09:29

## 2019-07-14 RX ADMIN — PANTOPRAZOLE SODIUM 40 MG: 40 TABLET, DELAYED RELEASE ORAL at 06:46

## 2019-07-14 RX ADMIN — LINAGLIPTIN 5 MG: 5 TABLET, FILM COATED ORAL at 09:29

## 2019-07-14 NOTE — GROUP NOTE
Group Therapy Note    Date: July 14    Group Start Time: 1100  Group End Time: 6211  Group Topic: Cognitive Skills    SEY[facility] 7SE ACUTE  1    YESICA Kirkland, [facility]    Number of participants: 16  Type of group: Cognitive Skills  Mode of intervention: Education, Support, Socialization, Exploration, Clarifying, Problem-solving and Activity  Topic: Boundaries  Objective: Pt will be able to identify personal boundaries within relationships and how to improve upon these boundaries     Notes: pt active and verbal during group and was able to identify ways to improve interaction with others    Status After Intervention:  Improved    Participation Level:  Active Listener and Interactive    Participation Quality: Appropriate, Attentive and Sharing      Speech:  normal      Thought Process/Content: Logical      Affective Functioning: Congruent      Mood: euthymic      Level of consciousness:  Alert and Attentive      Response to Learning: Able to verbalize current knowledge/experience and Able to retain information      Endings: None Reported        Discipline Responsible: /Counselor      Signature:  YESICA Kirkland, PRAVIN

## 2019-07-14 NOTE — DISCHARGE SUMMARY
lethargic  [] distractable       Attention/Concentration:   [] Attentive  [] Distracted        Memory Recent and Remote: [] Intact   [] Impaired [] Partially Impaired     Language: [] Able to recognize and name objects          [] Unable to recognize and name Objects    Fund of Knowledge:  [] Poor []  Fair  [] Good    Speech: [x] Normal  [] Soft  [] Slow  [] Fast [] Pressured            [] Loud [] Dysarthria  [] Incoherent       Appearance: [] Well Groomed  [x] Casual Dressed  [] Unkept  [] Disheveled          [] Normal weight[] Thin  [] Overweight  [] Obese           Attitude: [] Positive  [] Hostile  [] Demanding  [] Guarded  [] Defensive         [x] Cooperative  []  Uncooperative      Behavior:  [] Normal Gait  [] Walks with Assistance  [] Archie Romp    [] Walks with Dayanara Reek  [] In Hospital Bed  [] Sitting in Chair    Muscle-Skeletal:  [] Normal Muscle Tone [] Muscle Atrophy       [] Abnormal Muscle Movement     Eye Contact:  [x] Good eye contact  [] Intermittent Eye Contact  [] Poor Eye Contact     Mood: [] Depressed  [] Anxious  [] Irritated  [x] Euthymic   [] Angry [] Restless    Affect:  [x] Congruent  [] Incongruent  [] Labile  [] Constricted  [] Flat  [] Bizarre     Thought Process and Association:  [] Logical [] Illogical       [x] Linear and Goal Directed  [] Tangential  [] Circumstantial     Thought Content:  [x] Denies [] Endorses [] Suicidal [] Homicidal  [] Delusional      [] Paranoid  [] Somatic  [] Grandiose    Perception: [x]  None  [] Auditory   [] Visual  [] tactile   [] olfactory  [] Illusions         Insight: [] Intact  [x] Fair  [] Limited    Judgement:  [] Intact  [x] Fair  [] Limited    Hospital Course:   Admit Date: 7/8/2019     Discharge Date: 7/14/2019  Admitted from:  []  Emergency Room  []  Home  []  Another facility   []  [facility]     Admitting diagnosis:   Patient Active Problem List   Diagnosis    Schizoaffective disorder, chronic condition with acute exacerbation ([facility] [facility]    Schizophrenia  Chest pain    Cardiomyopathy, dilated, nonischemic (HCC)    H/O CHF    Automatic implantable cardioverter-defibrillator in situ    HTN (hypertension)    DM (diabetes mellitus) ([facility] [address]    Morbid obesity due to excess calories ([facility] [address]    RIKKI (obstructive sleep apnea)    GERD (gastroesophageal reflux disease)    Hiatal hernia    Tobacco abuse    Hyperlipemia    Paranoid schizophrenia ([facility] [address]    Type 2 diabetes mellitus, without long-term current use of insulin ([facility] [address]    Acute psychosis ([facility]      Length of stay:  6 days              Светлана Gipson was admitted in Psychiatric unit  from ED with psychosis. Patient was treated with the above. Patient responded well to the treatment. Patient states that he is not homicidal toward anyone, SI, or AVH. Paranoia better and no issues on unit. Collateral information from mother was received. Discharge Summary Plan:     Discharge Status:    [x] Improved [] Unchanged    [] Worse       Discharge instructions given:  [x] Patient    [] Family [] Other         Discharge disposition:  [x] Home [] Step Down unit  [] Group Home []  [facility]                                                    [] [facility]    [] AMA  [] Other           Prescriptions: Continue same medications, review with patient.        Reason for more than one antipsychotic:  [x] N/A  [] 3 failed monotherapy(drugs tried):  [] Cross over to a new antipsychotic  [] Taper to monotherapy from polypharmacy  [] Augmentation of Clozapine therapy due to treatment resistance to single therapy      Diagnosis:        Patient Active Problem List   Diagnosis Code    Schizoaffective disorder, chronic condition with acute exacerbation [facility] F25.8    Schizophrenia F20.9    Chest pain R07.9    Cardiomyopathy, dilated, nonischemic ([facility] I42.0    H/O CHF Z86.79    Automatic implantable cardioverter-defibrillator in situ Z95.810    HTN (hypertension) I10    DM (diabetes mellitus) ([facility] [address] E11.9    Morbid obesity due to

## 2019-07-15 NOTE — PROGRESS NOTES
unable to reached Pt for follow up.     Electronically signed by Gustavo Rodriguez on 7/15/2019 at 11:50 AM

## 2019-10-15 ENCOUNTER — HOSPITAL ENCOUNTER (EMERGENCY)
Age: 39
Discharge: PSYCHIATRIC OR SUBSTANCE USE FACILITY | End: 2019-10-16
Attending: EMERGENCY MEDICINE
Payer: MEDICARE

## 2019-10-15 DIAGNOSIS — Z91.14 NONCOMPLIANCE WITH MEDICATION REGIMEN: ICD-10-CM

## 2019-10-15 DIAGNOSIS — F39 MOOD DISORDER (HCC): Primary | ICD-10-CM

## 2019-10-15 LAB
AMPHETAMINE SCREEN, URINE: NOT DETECTED
BARBITURATE SCREEN URINE: NOT DETECTED
BENZODIAZEPINE SCREEN, URINE: NOT DETECTED
CANNABINOID SCREEN URINE: NOT DETECTED
COCAINE METABOLITE SCREEN URINE: NOT DETECTED
HCT VFR BLD CALC: 40.3 % (ref 37–54)
HEMOGLOBIN: 12 G/DL (ref 12.5–16.5)
Lab: NORMAL
MCH RBC QN AUTO: 24.9 PG (ref 26–35)
MCHC RBC AUTO-ENTMCNC: 29.8 % (ref 32–34.5)
MCV RBC AUTO: 83.8 FL (ref 80–99.9)
METHADONE SCREEN, URINE: NOT DETECTED
OPIATE SCREEN URINE: NOT DETECTED
PDW BLD-RTO: 14.9 FL (ref 11.5–15)
PHENCYCLIDINE SCREEN URINE: NOT DETECTED
PLATELET # BLD: 219 E9/L (ref 130–450)
PMV BLD AUTO: 9.3 FL (ref 7–12)
PROPOXYPHENE SCREEN: NOT DETECTED
RBC # BLD: 4.81 E12/L (ref 3.8–5.8)
WBC # BLD: 8.3 E9/L (ref 4.5–11.5)

## 2019-10-15 PROCEDURE — 80164 ASSAY DIPROPYLACETIC ACD TOT: CPT

## 2019-10-15 PROCEDURE — G0480 DRUG TEST DEF 1-7 CLASSES: HCPCS

## 2019-10-15 PROCEDURE — 80307 DRUG TEST PRSMV CHEM ANLYZR: CPT

## 2019-10-15 PROCEDURE — 85027 COMPLETE CBC AUTOMATED: CPT

## 2019-10-15 PROCEDURE — 99284 EMERGENCY DEPT VISIT MOD MDM: CPT

## 2019-10-15 PROCEDURE — 36415 COLL VENOUS BLD VENIPUNCTURE: CPT

## 2019-10-15 PROCEDURE — 80053 COMPREHEN METABOLIC PANEL: CPT

## 2019-10-16 VITALS
HEIGHT: 69 IN | TEMPERATURE: 98.2 F | BODY MASS INDEX: 37.03 KG/M2 | SYSTOLIC BLOOD PRESSURE: 154 MMHG | DIASTOLIC BLOOD PRESSURE: 95 MMHG | RESPIRATION RATE: 18 BRPM | HEART RATE: 88 BPM | OXYGEN SATURATION: 98 % | WEIGHT: 250 LBS

## 2019-10-16 LAB
ACETAMINOPHEN LEVEL: <5 MCG/ML (ref 10–30)
ALBUMIN SERPL-MCNC: 4.4 G/DL (ref 3.5–5.2)
ALP BLD-CCNC: 77 U/L (ref 40–129)
ALT SERPL-CCNC: 16 U/L (ref 0–40)
ANION GAP SERPL CALCULATED.3IONS-SCNC: 11 MMOL/L (ref 7–16)
AST SERPL-CCNC: 18 U/L (ref 0–39)
BILIRUB SERPL-MCNC: <0.2 MG/DL (ref 0–1.2)
BUN BLDV-MCNC: 7 MG/DL (ref 6–20)
CALCIUM SERPL-MCNC: 9.5 MG/DL (ref 8.6–10.2)
CHLORIDE BLD-SCNC: 102 MMOL/L (ref 98–107)
CO2: 25 MMOL/L (ref 22–29)
CREAT SERPL-MCNC: 1.1 MG/DL (ref 0.7–1.2)
ETHANOL: <10 MG/DL (ref 0–0.08)
GFR AFRICAN AMERICAN: >60
GFR NON-AFRICAN AMERICAN: >60 ML/MIN/1.73
GLUCOSE BLD-MCNC: 138 MG/DL (ref 74–99)
POTASSIUM SERPL-SCNC: 4 MMOL/L (ref 3.5–5)
SALICYLATE, SERUM: <0.3 MG/DL (ref 0–30)
SODIUM BLD-SCNC: 138 MMOL/L (ref 132–146)
TOTAL PROTEIN: 7.2 G/DL (ref 6.4–8.3)
TRICYCLIC ANTIDEPRESSANTS SCREEN SERUM: NEGATIVE NG/ML
VALPROIC ACID LEVEL: 3 MCG/ML (ref 50–100)

## 2019-12-11 ENCOUNTER — APPOINTMENT (OUTPATIENT)
Dept: GENERAL RADIOLOGY | Age: 39
DRG: 203 | End: 2019-12-11
Payer: MEDICARE

## 2019-12-11 ENCOUNTER — HOSPITAL ENCOUNTER (INPATIENT)
Age: 39
LOS: 1 days | Discharge: LEFT BEFORE DISCHARGE | DRG: 203 | End: 2019-12-12
Attending: EMERGENCY MEDICINE | Admitting: INTERNAL MEDICINE
Payer: MEDICARE

## 2019-12-11 DIAGNOSIS — R09.02 HYPOXIA: Primary | ICD-10-CM

## 2019-12-11 DIAGNOSIS — J98.01 BRONCHOSPASM: ICD-10-CM

## 2019-12-11 LAB
ADENOVIRUS BY PCR: NOT DETECTED
ALBUMIN SERPL-MCNC: 3.9 G/DL (ref 3.5–5.2)
ALP BLD-CCNC: 58 U/L (ref 40–129)
ALT SERPL-CCNC: 13 U/L (ref 0–40)
ANION GAP SERPL CALCULATED.3IONS-SCNC: 12 MMOL/L (ref 7–16)
AST SERPL-CCNC: 25 U/L (ref 0–39)
BILIRUB SERPL-MCNC: 0.3 MG/DL (ref 0–1.2)
BORDETELLA PARAPERTUSSIS BY PCR: NOT DETECTED
BORDETELLA PERTUSSIS BY PCR: NOT DETECTED
BUN BLDV-MCNC: 9 MG/DL (ref 6–20)
CALCIUM SERPL-MCNC: 8.7 MG/DL (ref 8.6–10.2)
CHLAMYDOPHILIA PNEUMONIAE BY PCR: NOT DETECTED
CHLORIDE BLD-SCNC: 104 MMOL/L (ref 98–107)
CO2: 22 MMOL/L (ref 22–29)
CORONAVIRUS 229E BY PCR: NOT DETECTED
CORONAVIRUS HKU1 BY PCR: NOT DETECTED
CORONAVIRUS NL63 BY PCR: NOT DETECTED
CORONAVIRUS OC43 BY PCR: NOT DETECTED
CREAT SERPL-MCNC: 1.2 MG/DL (ref 0.7–1.2)
EKG ATRIAL RATE: 98 BPM
EKG P AXIS: 171 DEGREES
EKG P-R INTERVAL: 140 MS
EKG Q-T INTERVAL: 332 MS
EKG QRS DURATION: 82 MS
EKG QTC CALCULATION (BAZETT): 423 MS
EKG R AXIS: 43 DEGREES
EKG T AXIS: -73 DEGREES
EKG VENTRICULAR RATE: 98 BPM
GFR AFRICAN AMERICAN: >60
GFR NON-AFRICAN AMERICAN: >60 ML/MIN/1.73
GLUCOSE BLD-MCNC: 146 MG/DL (ref 74–99)
HCT VFR BLD CALC: 40.2 % (ref 37–54)
HEMOGLOBIN: 12 G/DL (ref 12.5–16.5)
HUMAN METAPNEUMOVIRUS BY PCR: NOT DETECTED
HUMAN RHINOVIRUS/ENTEROVIRUS BY PCR: NOT DETECTED
INFLUENZA A BY PCR: NOT DETECTED
INFLUENZA A BY PCR: NOT DETECTED
INFLUENZA B BY PCR: NOT DETECTED
INFLUENZA B BY PCR: NOT DETECTED
MCH RBC QN AUTO: 24.8 PG (ref 26–35)
MCHC RBC AUTO-ENTMCNC: 29.9 % (ref 32–34.5)
MCV RBC AUTO: 83.1 FL (ref 80–99.9)
MYCOPLASMA PNEUMONIAE BY PCR: NOT DETECTED
PARAINFLUENZA VIRUS 1 BY PCR: NOT DETECTED
PARAINFLUENZA VIRUS 2 BY PCR: NOT DETECTED
PARAINFLUENZA VIRUS 3 BY PCR: NOT DETECTED
PARAINFLUENZA VIRUS 4 BY PCR: NOT DETECTED
PDW BLD-RTO: 17.1 FL (ref 11.5–15)
PLATELET # BLD: 149 E9/L (ref 130–450)
PMV BLD AUTO: 11.1 FL (ref 7–12)
POTASSIUM SERPL-SCNC: 4.2 MMOL/L (ref 3.5–5)
PRO-BNP: 366 PG/ML (ref 0–125)
RBC # BLD: 4.84 E12/L (ref 3.8–5.8)
RESPIRATORY SYNCYTIAL VIRUS BY PCR: DETECTED
SODIUM BLD-SCNC: 138 MMOL/L (ref 132–146)
TOTAL PROTEIN: 6.7 G/DL (ref 6.4–8.3)
TROPONIN: <0.01 NG/ML (ref 0–0.03)
WBC # BLD: 5.2 E9/L (ref 4.5–11.5)

## 2019-12-11 PROCEDURE — 93005 ELECTROCARDIOGRAM TRACING: CPT | Performed by: NURSE PRACTITIONER

## 2019-12-11 PROCEDURE — 6360000002 HC RX W HCPCS: Performed by: INTERNAL MEDICINE

## 2019-12-11 PROCEDURE — 93010 ELECTROCARDIOGRAM REPORT: CPT | Performed by: INTERNAL MEDICINE

## 2019-12-11 PROCEDURE — 6370000000 HC RX 637 (ALT 250 FOR IP): Performed by: INTERNAL MEDICINE

## 2019-12-11 PROCEDURE — 71046 X-RAY EXAM CHEST 2 VIEWS: CPT

## 2019-12-11 PROCEDURE — 0100U HC RESPIRPTHGN MULT REV TRANS & AMP PRB TECH 21 TRGT: CPT

## 2019-12-11 PROCEDURE — 84484 ASSAY OF TROPONIN QUANT: CPT

## 2019-12-11 PROCEDURE — 2060000000 HC ICU INTERMEDIATE R&B

## 2019-12-11 PROCEDURE — 83880 ASSAY OF NATRIURETIC PEPTIDE: CPT

## 2019-12-11 PROCEDURE — 6370000000 HC RX 637 (ALT 250 FOR IP): Performed by: EMERGENCY MEDICINE

## 2019-12-11 PROCEDURE — 85027 COMPLETE CBC AUTOMATED: CPT

## 2019-12-11 PROCEDURE — 6370000000 HC RX 637 (ALT 250 FOR IP): Performed by: NURSE PRACTITIONER

## 2019-12-11 PROCEDURE — 6360000002 HC RX W HCPCS: Performed by: EMERGENCY MEDICINE

## 2019-12-11 PROCEDURE — 2580000003 HC RX 258: Performed by: INTERNAL MEDICINE

## 2019-12-11 PROCEDURE — 99285 EMERGENCY DEPT VISIT HI MDM: CPT

## 2019-12-11 PROCEDURE — 87502 INFLUENZA DNA AMP PROBE: CPT

## 2019-12-11 PROCEDURE — 94640 AIRWAY INHALATION TREATMENT: CPT

## 2019-12-11 PROCEDURE — 2580000003 HC RX 258: Performed by: EMERGENCY MEDICINE

## 2019-12-11 PROCEDURE — 80053 COMPREHEN METABOLIC PANEL: CPT

## 2019-12-11 PROCEDURE — 36415 COLL VENOUS BLD VENIPUNCTURE: CPT

## 2019-12-11 RX ORDER — ENALAPRIL MALEATE 10 MG/1
10 TABLET ORAL 2 TIMES DAILY
Status: DISCONTINUED | OUTPATIENT
Start: 2019-12-11 | End: 2019-12-12 | Stop reason: HOSPADM

## 2019-12-11 RX ORDER — DIVALPROEX SODIUM 250 MG/1
1000 TABLET, DELAYED RELEASE ORAL EVERY 12 HOURS SCHEDULED
Status: DISCONTINUED | OUTPATIENT
Start: 2019-12-11 | End: 2019-12-12 | Stop reason: HOSPADM

## 2019-12-11 RX ORDER — SODIUM CHLORIDE 9 MG/ML
INJECTION, SOLUTION INTRAVENOUS CONTINUOUS
Status: DISCONTINUED | OUTPATIENT
Start: 2019-12-11 | End: 2019-12-11

## 2019-12-11 RX ORDER — SODIUM CHLORIDE 0.9 % (FLUSH) 0.9 %
10 SYRINGE (ML) INJECTION EVERY 12 HOURS SCHEDULED
Status: DISCONTINUED | OUTPATIENT
Start: 2019-12-11 | End: 2019-12-12 | Stop reason: HOSPADM

## 2019-12-11 RX ORDER — PREDNISONE 20 MG/1
60 TABLET ORAL ONCE
Status: COMPLETED | OUTPATIENT
Start: 2019-12-11 | End: 2019-12-11

## 2019-12-11 RX ORDER — TRAZODONE HYDROCHLORIDE 50 MG/1
50 TABLET ORAL NIGHTLY PRN
Status: DISCONTINUED | OUTPATIENT
Start: 2019-12-11 | End: 2019-12-12 | Stop reason: HOSPADM

## 2019-12-11 RX ORDER — SPIRONOLACTONE 25 MG/1
50 TABLET ORAL DAILY
Status: DISCONTINUED | OUTPATIENT
Start: 2019-12-11 | End: 2019-12-12 | Stop reason: HOSPADM

## 2019-12-11 RX ORDER — PANTOPRAZOLE SODIUM 40 MG/1
40 TABLET, DELAYED RELEASE ORAL
Status: DISCONTINUED | OUTPATIENT
Start: 2019-12-12 | End: 2019-12-12 | Stop reason: HOSPADM

## 2019-12-11 RX ORDER — SODIUM CHLORIDE 0.9 % (FLUSH) 0.9 %
10 SYRINGE (ML) INJECTION PRN
Status: DISCONTINUED | OUTPATIENT
Start: 2019-12-11 | End: 2019-12-12 | Stop reason: HOSPADM

## 2019-12-11 RX ORDER — IPRATROPIUM BROMIDE AND ALBUTEROL SULFATE 2.5; .5 MG/3ML; MG/3ML
1 SOLUTION RESPIRATORY (INHALATION)
Status: DISCONTINUED | OUTPATIENT
Start: 2019-12-11 | End: 2019-12-12 | Stop reason: HOSPADM

## 2019-12-11 RX ORDER — FUROSEMIDE 40 MG/1
40 TABLET ORAL DAILY
Status: DISCONTINUED | OUTPATIENT
Start: 2019-12-11 | End: 2019-12-12 | Stop reason: HOSPADM

## 2019-12-11 RX ORDER — GLIMEPIRIDE 4 MG/1
4 TABLET ORAL
Status: DISCONTINUED | OUTPATIENT
Start: 2019-12-12 | End: 2019-12-12 | Stop reason: HOSPADM

## 2019-12-11 RX ORDER — IPRATROPIUM BROMIDE AND ALBUTEROL SULFATE 2.5; .5 MG/3ML; MG/3ML
1 SOLUTION RESPIRATORY (INHALATION)
Status: COMPLETED | OUTPATIENT
Start: 2019-12-11 | End: 2019-12-11

## 2019-12-11 RX ORDER — ATORVASTATIN CALCIUM 40 MG/1
40 TABLET, FILM COATED ORAL DAILY
Status: DISCONTINUED | OUTPATIENT
Start: 2019-12-11 | End: 2019-12-12 | Stop reason: HOSPADM

## 2019-12-11 RX ORDER — KETOROLAC TROMETHAMINE 30 MG/ML
15 INJECTION, SOLUTION INTRAMUSCULAR; INTRAVENOUS ONCE
Status: COMPLETED | OUTPATIENT
Start: 2019-12-11 | End: 2019-12-11

## 2019-12-11 RX ORDER — ACETAMINOPHEN 500 MG
1000 TABLET ORAL ONCE
Status: COMPLETED | OUTPATIENT
Start: 2019-12-11 | End: 2019-12-11

## 2019-12-11 RX ORDER — ONDANSETRON 2 MG/ML
4 INJECTION INTRAMUSCULAR; INTRAVENOUS EVERY 6 HOURS PRN
Status: DISCONTINUED | OUTPATIENT
Start: 2019-12-11 | End: 2019-12-12 | Stop reason: HOSPADM

## 2019-12-11 RX ORDER — ALLOPURINOL 100 MG/1
100 TABLET ORAL DAILY
Status: DISCONTINUED | OUTPATIENT
Start: 2019-12-11 | End: 2019-12-12 | Stop reason: HOSPADM

## 2019-12-11 RX ADMIN — IPRATROPIUM BROMIDE AND ALBUTEROL SULFATE 1 AMPULE: 2.5; .5 SOLUTION RESPIRATORY (INHALATION) at 13:40

## 2019-12-11 RX ADMIN — TRAZODONE HYDROCHLORIDE 50 MG: 50 TABLET ORAL at 21:30

## 2019-12-11 RX ADMIN — FUROSEMIDE 40 MG: 40 TABLET ORAL at 21:30

## 2019-12-11 RX ADMIN — PREDNISONE 60 MG: 20 TABLET ORAL at 13:23

## 2019-12-11 RX ADMIN — IPRATROPIUM BROMIDE AND ALBUTEROL SULFATE 1 AMPULE: 2.5; .5 SOLUTION RESPIRATORY (INHALATION) at 20:56

## 2019-12-11 RX ADMIN — ENALAPRIL MALEATE 10 MG: 10 TABLET ORAL at 21:30

## 2019-12-11 RX ADMIN — ATORVASTATIN CALCIUM 40 MG: 40 TABLET, FILM COATED ORAL at 21:28

## 2019-12-11 RX ADMIN — KETOROLAC TROMETHAMINE 15 MG: 30 INJECTION, SOLUTION INTRAMUSCULAR; INTRAVENOUS at 13:23

## 2019-12-11 RX ADMIN — IPRATROPIUM BROMIDE AND ALBUTEROL SULFATE 1 AMPULE: 2.5; .5 SOLUTION RESPIRATORY (INHALATION) at 13:44

## 2019-12-11 RX ADMIN — Medication 10 ML: at 21:30

## 2019-12-11 RX ADMIN — SPIRONOLACTONE 50 MG: 25 TABLET ORAL at 21:29

## 2019-12-11 RX ADMIN — METFORMIN HYDROCHLORIDE 1000 MG: 1000 TABLET ORAL at 21:29

## 2019-12-11 RX ADMIN — IPRATROPIUM BROMIDE AND ALBUTEROL SULFATE 1 AMPULE: 2.5; .5 SOLUTION RESPIRATORY (INHALATION) at 13:47

## 2019-12-11 RX ADMIN — SODIUM CHLORIDE: 9 INJECTION, SOLUTION INTRAVENOUS at 13:23

## 2019-12-11 RX ADMIN — DIVALPROEX SODIUM 1000 MG: 250 TABLET, DELAYED RELEASE ORAL at 21:29

## 2019-12-11 RX ADMIN — IPRATROPIUM BROMIDE AND ALBUTEROL SULFATE 1 AMPULE: 2.5; .5 SOLUTION RESPIRATORY (INHALATION) at 16:03

## 2019-12-11 RX ADMIN — LINAGLIPTIN 5 MG: 5 TABLET, FILM COATED ORAL at 21:29

## 2019-12-11 RX ADMIN — ACETAMINOPHEN 1000 MG: 500 TABLET ORAL at 10:17

## 2019-12-11 RX ADMIN — ALLOPURINOL 100 MG: 100 TABLET ORAL at 21:28

## 2019-12-11 RX ADMIN — ENOXAPARIN SODIUM 40 MG: 40 INJECTION SUBCUTANEOUS at 21:30

## 2019-12-11 ASSESSMENT — PAIN SCALES - GENERAL
PAINLEVEL_OUTOF10: 3
PAINLEVEL_OUTOF10: 5

## 2019-12-12 VITALS
TEMPERATURE: 97.4 F | OXYGEN SATURATION: 92 % | WEIGHT: 250 LBS | BODY MASS INDEX: 37.03 KG/M2 | SYSTOLIC BLOOD PRESSURE: 154 MMHG | HEART RATE: 93 BPM | DIASTOLIC BLOOD PRESSURE: 77 MMHG | HEIGHT: 69 IN | RESPIRATION RATE: 20 BRPM

## 2019-12-12 LAB
BASOPHILS ABSOLUTE: 0 E9/L (ref 0–0.2)
BASOPHILS RELATIVE PERCENT: 0.4 % (ref 0–2)
D DIMER: 412 NG/ML DDU
EOSINOPHILS ABSOLUTE: 0.05 E9/L (ref 0.05–0.5)
EOSINOPHILS RELATIVE PERCENT: 0.9 % (ref 0–6)
HCT VFR BLD CALC: 42 % (ref 37–54)
HEMOGLOBIN: 12.4 G/DL (ref 12.5–16.5)
LYMPHOCYTES ABSOLUTE: 1.3 E9/L (ref 1.5–4)
LYMPHOCYTES RELATIVE PERCENT: 25.2 % (ref 20–42)
MCH RBC QN AUTO: 24.8 PG (ref 26–35)
MCHC RBC AUTO-ENTMCNC: 29.5 % (ref 32–34.5)
MCV RBC AUTO: 84.2 FL (ref 80–99.9)
METER GLUCOSE: 80 MG/DL (ref 74–99)
MONOCYTES ABSOLUTE: 0.52 E9/L (ref 0.1–0.95)
MONOCYTES RELATIVE PERCENT: 10.4 % (ref 2–12)
NEUTROPHILS ABSOLUTE: 3.33 E9/L (ref 1.8–7.3)
NEUTROPHILS RELATIVE PERCENT: 63.5 % (ref 43–80)
PDW BLD-RTO: 16.8 FL (ref 11.5–15)
PLATELET # BLD: 155 E9/L (ref 130–450)
PMV BLD AUTO: 10.8 FL (ref 7–12)
RBC # BLD: 4.99 E12/L (ref 3.8–5.8)
WBC # BLD: 5.2 E9/L (ref 4.5–11.5)

## 2019-12-12 PROCEDURE — 85025 COMPLETE CBC W/AUTO DIFF WBC: CPT

## 2019-12-12 PROCEDURE — 6360000002 HC RX W HCPCS: Performed by: INTERNAL MEDICINE

## 2019-12-12 PROCEDURE — 6370000000 HC RX 637 (ALT 250 FOR IP): Performed by: INTERNAL MEDICINE

## 2019-12-12 PROCEDURE — 2580000003 HC RX 258: Performed by: INTERNAL MEDICINE

## 2019-12-12 PROCEDURE — 36415 COLL VENOUS BLD VENIPUNCTURE: CPT

## 2019-12-12 PROCEDURE — 85378 FIBRIN DEGRADE SEMIQUANT: CPT

## 2019-12-12 PROCEDURE — 82962 GLUCOSE BLOOD TEST: CPT

## 2019-12-12 PROCEDURE — 2700000000 HC OXYGEN THERAPY PER DAY

## 2019-12-12 PROCEDURE — 94640 AIRWAY INHALATION TREATMENT: CPT

## 2019-12-12 RX ORDER — DEXTROSE MONOHYDRATE 25 G/50ML
12.5 INJECTION, SOLUTION INTRAVENOUS PRN
Status: DISCONTINUED | OUTPATIENT
Start: 2019-12-12 | End: 2019-12-12 | Stop reason: HOSPADM

## 2019-12-12 RX ORDER — DEXTROSE MONOHYDRATE 50 MG/ML
100 INJECTION, SOLUTION INTRAVENOUS PRN
Status: DISCONTINUED | OUTPATIENT
Start: 2019-12-12 | End: 2019-12-12 | Stop reason: HOSPADM

## 2019-12-12 RX ORDER — BUDESONIDE 0.5 MG/2ML
500 INHALANT ORAL 2 TIMES DAILY
Status: DISCONTINUED | OUTPATIENT
Start: 2019-12-12 | End: 2019-12-12 | Stop reason: HOSPADM

## 2019-12-12 RX ORDER — METHYLPREDNISOLONE SODIUM SUCCINATE 40 MG/ML
40 INJECTION, POWDER, LYOPHILIZED, FOR SOLUTION INTRAMUSCULAR; INTRAVENOUS EVERY 12 HOURS
Status: DISCONTINUED | OUTPATIENT
Start: 2019-12-12 | End: 2019-12-12 | Stop reason: HOSPADM

## 2019-12-12 RX ORDER — NICOTINE POLACRILEX 4 MG
15 LOZENGE BUCCAL PRN
Status: DISCONTINUED | OUTPATIENT
Start: 2019-12-12 | End: 2019-12-12 | Stop reason: HOSPADM

## 2019-12-12 RX ADMIN — METHYLPREDNISOLONE SODIUM SUCCINATE 40 MG: 40 INJECTION, POWDER, FOR SOLUTION INTRAMUSCULAR; INTRAVENOUS at 13:01

## 2019-12-12 RX ADMIN — Medication 10 ML: at 10:08

## 2019-12-12 RX ADMIN — GLIMEPIRIDE 4 MG: 4 TABLET ORAL at 10:07

## 2019-12-12 RX ADMIN — ATORVASTATIN CALCIUM 40 MG: 40 TABLET, FILM COATED ORAL at 10:08

## 2019-12-12 RX ADMIN — ENOXAPARIN SODIUM 40 MG: 40 INJECTION SUBCUTANEOUS at 10:08

## 2019-12-12 RX ADMIN — FUROSEMIDE 40 MG: 40 TABLET ORAL at 10:07

## 2019-12-12 RX ADMIN — SPIRONOLACTONE 50 MG: 25 TABLET ORAL at 10:07

## 2019-12-12 RX ADMIN — PANTOPRAZOLE SODIUM 40 MG: 40 TABLET, DELAYED RELEASE ORAL at 05:19

## 2019-12-12 RX ADMIN — ENALAPRIL MALEATE 10 MG: 10 TABLET ORAL at 10:07

## 2019-12-12 RX ADMIN — METFORMIN HYDROCHLORIDE 1000 MG: 1000 TABLET ORAL at 10:07

## 2019-12-12 RX ADMIN — IPRATROPIUM BROMIDE AND ALBUTEROL SULFATE 1 AMPULE: 2.5; .5 SOLUTION RESPIRATORY (INHALATION) at 09:13

## 2019-12-12 RX ADMIN — LINAGLIPTIN 5 MG: 5 TABLET, FILM COATED ORAL at 10:07

## 2019-12-12 RX ADMIN — ALLOPURINOL 100 MG: 100 TABLET ORAL at 10:08

## 2019-12-12 RX ADMIN — SODIUM CHLORIDE, PRESERVATIVE FREE 10 ML: 5 INJECTION INTRAVENOUS at 13:02

## 2019-12-12 RX ADMIN — DIVALPROEX SODIUM 1000 MG: 250 TABLET, DELAYED RELEASE ORAL at 10:07

## 2019-12-12 ASSESSMENT — PAIN SCALES - GENERAL
PAINLEVEL_OUTOF10: 0
PAINLEVEL_OUTOF10: 0

## 2019-12-17 PROBLEM — Z09 HOSPITAL DISCHARGE FOLLOW-UP: Status: ACTIVE | Noted: 2019-12-17

## 2020-01-07 ENCOUNTER — HOSPITAL ENCOUNTER (OUTPATIENT)
Age: 40
Discharge: HOME | End: 2020-01-07
Payer: MEDICARE

## 2020-01-07 LAB
ALBUMIN SERPL-MCNC: 4 G/DL (ref 3.5–5.2)
ALP BLD-CCNC: 62 U/L (ref 40–129)
ALT SERPL-CCNC: 13 U/L (ref 0–40)
ANION GAP SERPL CALCULATED.3IONS-SCNC: 12 MMOL/L (ref 7–16)
AST SERPL-CCNC: 15 U/L (ref 0–39)
BILIRUB SERPL-MCNC: 0.3 MG/DL (ref 0–1.2)
BUN BLDV-MCNC: 9 MG/DL (ref 6–20)
CALCIUM SERPL-MCNC: 9 MG/DL (ref 8.6–10.2)
CHLORIDE BLD-SCNC: 102 MMOL/L (ref 98–107)
CHOLESTEROL, TOTAL: 135 MG/DL (ref 0–199)
CO2: 24 MMOL/L (ref 22–29)
CREAT SERPL-MCNC: 1.2 MG/DL (ref 0.7–1.2)
GFR AFRICAN AMERICAN: >60
GFR NON-AFRICAN AMERICAN: >60 ML/MIN/1.73
GLUCOSE BLD-MCNC: 131 MG/DL (ref 74–99)
HDLC SERPL-MCNC: 46 MG/DL
LDL CHOLESTEROL CALCULATED: 66 MG/DL (ref 0–99)
POTASSIUM SERPL-SCNC: 4.4 MMOL/L (ref 3.5–5)
SODIUM BLD-SCNC: 138 MMOL/L (ref 132–146)
TOTAL PROTEIN: 6.7 G/DL (ref 6.4–8.3)
TRIGL SERPL-MCNC: 113 MG/DL (ref 0–149)
VALPROIC ACID LEVEL: 61 MCG/ML (ref 50–100)
VLDLC SERPL CALC-MCNC: 23 MG/DL

## 2020-01-07 PROCEDURE — 80053 COMPREHEN METABOLIC PANEL: CPT

## 2020-01-07 PROCEDURE — 36415 COLL VENOUS BLD VENIPUNCTURE: CPT

## 2020-01-07 PROCEDURE — 80061 LIPID PANEL: CPT

## 2020-01-07 PROCEDURE — 80164 ASSAY DIPROPYLACETIC ACD TOT: CPT

## 2020-01-16 PROBLEM — Z09 HOSPITAL DISCHARGE FOLLOW-UP: Status: RESOLVED | Noted: 2019-12-17 | Resolved: 2020-01-16

## 2020-04-09 ENCOUNTER — TELEPHONE (OUTPATIENT)
Dept: CARDIOLOGY CLINIC | Age: 40
End: 2020-04-09

## 2020-04-27 ENCOUNTER — APPOINTMENT (OUTPATIENT)
Dept: ULTRASOUND IMAGING | Age: 40
End: 2020-04-27
Payer: MEDICARE

## 2020-04-27 ENCOUNTER — APPOINTMENT (OUTPATIENT)
Dept: GENERAL RADIOLOGY | Age: 40
End: 2020-04-27
Payer: MEDICARE

## 2020-04-27 ENCOUNTER — HOSPITAL ENCOUNTER (EMERGENCY)
Age: 40
Discharge: HOME | End: 2020-04-27
Attending: EMERGENCY MEDICINE
Payer: MEDICARE

## 2020-04-27 VITALS
HEART RATE: 96 BPM | BODY MASS INDEX: 41.47 KG/M2 | OXYGEN SATURATION: 95 % | SYSTOLIC BLOOD PRESSURE: 147 MMHG | DIASTOLIC BLOOD PRESSURE: 90 MMHG | HEIGHT: 69 IN | WEIGHT: 280 LBS | RESPIRATION RATE: 16 BRPM | TEMPERATURE: 99.2 F

## 2020-04-27 LAB
ALBUMIN SERPL-MCNC: 4.1 G/DL (ref 3.5–5.2)
ALP BLD-CCNC: 73 U/L (ref 40–129)
ALT SERPL-CCNC: 14 U/L (ref 0–40)
ANION GAP SERPL CALCULATED.3IONS-SCNC: 11 MMOL/L (ref 7–16)
AST SERPL-CCNC: 25 U/L (ref 0–39)
BASOPHILS ABSOLUTE: 0.03 E9/L (ref 0–0.2)
BASOPHILS RELATIVE PERCENT: 0.4 % (ref 0–2)
BILIRUB SERPL-MCNC: 0.2 MG/DL (ref 0–1.2)
BUN BLDV-MCNC: 11 MG/DL (ref 6–20)
CALCIUM SERPL-MCNC: 9.8 MG/DL (ref 8.6–10.2)
CHLORIDE BLD-SCNC: 100 MMOL/L (ref 98–107)
CO2: 27 MMOL/L (ref 22–29)
CREAT SERPL-MCNC: 1 MG/DL (ref 0.7–1.2)
EOSINOPHILS ABSOLUTE: 0.04 E9/L (ref 0.05–0.5)
EOSINOPHILS RELATIVE PERCENT: 0.6 % (ref 0–6)
GFR AFRICAN AMERICAN: >60
GFR NON-AFRICAN AMERICAN: >60 ML/MIN/1.73
GLUCOSE BLD-MCNC: 266 MG/DL (ref 74–99)
HCT VFR BLD CALC: 42.4 % (ref 37–54)
HEMOGLOBIN: 12.9 G/DL (ref 12.5–16.5)
IMMATURE GRANULOCYTES #: 0.06 E9/L
IMMATURE GRANULOCYTES %: 0.9 % (ref 0–5)
LYMPHOCYTES ABSOLUTE: 1.89 E9/L (ref 1.5–4)
LYMPHOCYTES RELATIVE PERCENT: 27 % (ref 20–42)
MCH RBC QN AUTO: 26.1 PG (ref 26–35)
MCHC RBC AUTO-ENTMCNC: 30.4 % (ref 32–34.5)
MCV RBC AUTO: 85.8 FL (ref 80–99.9)
MONOCYTES ABSOLUTE: 0.84 E9/L (ref 0.1–0.95)
MONOCYTES RELATIVE PERCENT: 12 % (ref 2–12)
NEUTROPHILS ABSOLUTE: 4.15 E9/L (ref 1.8–7.3)
NEUTROPHILS RELATIVE PERCENT: 59.1 % (ref 43–80)
PDW BLD-RTO: 16.5 FL (ref 11.5–15)
PLATELET # BLD: 190 E9/L (ref 130–450)
PMV BLD AUTO: 10.7 FL (ref 7–12)
POTASSIUM REFLEX MAGNESIUM: 5.3 MMOL/L (ref 3.5–5)
PRO-BNP: 450 PG/ML (ref 0–125)
RBC # BLD: 4.94 E12/L (ref 3.8–5.8)
SODIUM BLD-SCNC: 138 MMOL/L (ref 132–146)
TOTAL PROTEIN: 6.4 G/DL (ref 6.4–8.3)
TROPONIN: <0.01 NG/ML (ref 0–0.03)
WBC # BLD: 7 E9/L (ref 4.5–11.5)

## 2020-04-27 PROCEDURE — 85025 COMPLETE CBC W/AUTO DIFF WBC: CPT

## 2020-04-27 PROCEDURE — 93970 EXTREMITY STUDY: CPT

## 2020-04-27 PROCEDURE — 96374 THER/PROPH/DIAG INJ IV PUSH: CPT

## 2020-04-27 PROCEDURE — 93005 ELECTROCARDIOGRAM TRACING: CPT | Performed by: EMERGENCY MEDICINE

## 2020-04-27 PROCEDURE — 71045 X-RAY EXAM CHEST 1 VIEW: CPT

## 2020-04-27 PROCEDURE — 83880 ASSAY OF NATRIURETIC PEPTIDE: CPT

## 2020-04-27 PROCEDURE — 99284 EMERGENCY DEPT VISIT MOD MDM: CPT

## 2020-04-27 PROCEDURE — 80053 COMPREHEN METABOLIC PANEL: CPT

## 2020-04-27 PROCEDURE — 6360000002 HC RX W HCPCS: Performed by: EMERGENCY MEDICINE

## 2020-04-27 PROCEDURE — 84484 ASSAY OF TROPONIN QUANT: CPT

## 2020-04-27 RX ORDER — FUROSEMIDE 10 MG/ML
40 INJECTION INTRAMUSCULAR; INTRAVENOUS ONCE
Status: COMPLETED | OUTPATIENT
Start: 2020-04-27 | End: 2020-04-27

## 2020-04-27 RX ORDER — MORPHINE SULFATE 4 MG/ML
4 INJECTION, SOLUTION INTRAMUSCULAR; INTRAVENOUS ONCE
Status: DISCONTINUED | OUTPATIENT
Start: 2020-04-27 | End: 2020-04-27

## 2020-04-27 RX ORDER — FENTANYL CITRATE 50 UG/ML
75 INJECTION, SOLUTION INTRAMUSCULAR; INTRAVENOUS ONCE
Status: DISCONTINUED | OUTPATIENT
Start: 2020-04-27 | End: 2020-04-27

## 2020-04-27 RX ORDER — ONDANSETRON 2 MG/ML
4 INJECTION INTRAMUSCULAR; INTRAVENOUS ONCE
Status: DISCONTINUED | OUTPATIENT
Start: 2020-04-27 | End: 2020-04-27

## 2020-04-27 RX ORDER — 0.9 % SODIUM CHLORIDE 0.9 %
1000 INTRAVENOUS SOLUTION INTRAVENOUS ONCE
Status: DISCONTINUED | OUTPATIENT
Start: 2020-04-27 | End: 2020-04-27

## 2020-04-27 RX ADMIN — FUROSEMIDE 40 MG: 10 INJECTION, SOLUTION INTRAMUSCULAR; INTRAVENOUS at 19:32

## 2020-04-27 ASSESSMENT — ENCOUNTER SYMPTOMS
EYE PAIN: 0
FACIAL SWELLING: 0
NAUSEA: 0
DIARRHEA: 0
VOMITING: 0
COUGH: 0
SORE THROAT: 0
ABDOMINAL PAIN: 0
EYE REDNESS: 0
PHOTOPHOBIA: 0
CONSTIPATION: 0
ALLERGIC/IMMUNOLOGIC NEGATIVE: 1
CHEST TIGHTNESS: 0
SHORTNESS OF BREATH: 0

## 2020-04-27 ASSESSMENT — PAIN SCALES - GENERAL: PAINLEVEL_OUTOF10: 4
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
## 2020-04-29 LAB
EKG ATRIAL RATE: 90 BPM
EKG P AXIS: 52 DEGREES
EKG P-R INTERVAL: 150 MS
EKG Q-T INTERVAL: 352 MS
EKG QRS DURATION: 72 MS
EKG QTC CALCULATION (BAZETT): 430 MS
EKG R AXIS: 17 DEGREES
EKG T AXIS: 86 DEGREES
EKG VENTRICULAR RATE: 90 BPM

## 2020-07-14 ENCOUNTER — HOSPITAL ENCOUNTER (INPATIENT)
Age: 40
LOS: 10 days | Discharge: HOME | DRG: 885 | End: 2020-07-24
Attending: EMERGENCY MEDICINE | Admitting: PSYCHIATRY & NEUROLOGY
Payer: MEDICARE

## 2020-07-14 PROBLEM — F39 MOOD DISORDER (HCC): Status: ACTIVE | Noted: 2020-07-14

## 2020-07-14 LAB
ACETAMINOPHEN LEVEL: <5 MCG/ML (ref 10–30)
ALBUMIN SERPL-MCNC: 3.9 G/DL (ref 3.5–5.2)
ALP BLD-CCNC: 70 U/L (ref 40–129)
ALT SERPL-CCNC: 15 U/L (ref 0–40)
AMPHETAMINE SCREEN, URINE: NOT DETECTED
ANION GAP SERPL CALCULATED.3IONS-SCNC: 9 MMOL/L (ref 7–16)
AST SERPL-CCNC: 22 U/L (ref 0–39)
BARBITURATE SCREEN URINE: NOT DETECTED
BASOPHILS ABSOLUTE: 0.02 E9/L (ref 0–0.2)
BASOPHILS RELATIVE PERCENT: 0.3 % (ref 0–2)
BENZODIAZEPINE SCREEN, URINE: NOT DETECTED
BILIRUB SERPL-MCNC: 0.3 MG/DL (ref 0–1.2)
BILIRUBIN URINE: NEGATIVE
BLOOD, URINE: NEGATIVE
BUN BLDV-MCNC: 14 MG/DL (ref 6–20)
CALCIUM SERPL-MCNC: 9.5 MG/DL (ref 8.6–10.2)
CANNABINOID SCREEN URINE: NOT DETECTED
CHLORIDE BLD-SCNC: 106 MMOL/L (ref 98–107)
CLARITY: CLEAR
CO2: 24 MMOL/L (ref 22–29)
COCAINE METABOLITE SCREEN URINE: NOT DETECTED
COLOR: YELLOW
CREAT SERPL-MCNC: 1.3 MG/DL (ref 0.7–1.2)
EOSINOPHILS ABSOLUTE: 0.07 E9/L (ref 0.05–0.5)
EOSINOPHILS RELATIVE PERCENT: 1.1 % (ref 0–6)
ETHANOL: <10 MG/DL (ref 0–0.08)
FENTANYL SCREEN, URINE: NOT DETECTED
GFR AFRICAN AMERICAN: >60
GFR NON-AFRICAN AMERICAN: >60 ML/MIN/1.73
GLUCOSE BLD-MCNC: 156 MG/DL (ref 74–99)
GLUCOSE URINE: NEGATIVE MG/DL
HCT VFR BLD CALC: 43.9 % (ref 37–54)
HEMOGLOBIN: 13.8 G/DL (ref 12.5–16.5)
IMMATURE GRANULOCYTES #: 0.03 E9/L
IMMATURE GRANULOCYTES %: 0.5 % (ref 0–5)
KETONES, URINE: NEGATIVE MG/DL
LEUKOCYTE ESTERASE, URINE: NEGATIVE
LYMPHOCYTES ABSOLUTE: 2.45 E9/L (ref 1.5–4)
LYMPHOCYTES RELATIVE PERCENT: 38 % (ref 20–42)
Lab: NORMAL
MCH RBC QN AUTO: 27.6 PG (ref 26–35)
MCHC RBC AUTO-ENTMCNC: 31.4 % (ref 32–34.5)
MCV RBC AUTO: 87.8 FL (ref 80–99.9)
METHADONE SCREEN, URINE: NOT DETECTED
MONOCYTES ABSOLUTE: 0.5 E9/L (ref 0.1–0.95)
MONOCYTES RELATIVE PERCENT: 7.8 % (ref 2–12)
NEUTROPHILS ABSOLUTE: 3.38 E9/L (ref 1.8–7.3)
NEUTROPHILS RELATIVE PERCENT: 52.3 % (ref 43–80)
NITRITE, URINE: NEGATIVE
OPIATE SCREEN URINE: NOT DETECTED
OXYCODONE URINE: NOT DETECTED
PDW BLD-RTO: 16.9 FL (ref 11.5–15)
PH UA: 7 (ref 5–9)
PHENCYCLIDINE SCREEN URINE: NOT DETECTED
PLATELET # BLD: 145 E9/L (ref 130–450)
PMV BLD AUTO: 10.4 FL (ref 7–12)
POTASSIUM SERPL-SCNC: 4.4 MMOL/L (ref 3.5–5)
PROTEIN UA: NEGATIVE MG/DL
RBC # BLD: 5 E12/L (ref 3.8–5.8)
SALICYLATE, SERUM: <0.3 MG/DL (ref 0–30)
SARS-COV-2, NAAT: NOT DETECTED
SODIUM BLD-SCNC: 139 MMOL/L (ref 132–146)
SPECIFIC GRAVITY UA: 1.02 (ref 1–1.03)
TOTAL PROTEIN: 6.4 G/DL (ref 6.4–8.3)
TRICYCLIC ANTIDEPRESSANTS SCREEN SERUM: NEGATIVE NG/ML
UROBILINOGEN, URINE: 1 E.U./DL
WBC # BLD: 6.5 E9/L (ref 4.5–11.5)

## 2020-07-14 PROCEDURE — 85025 COMPLETE CBC W/AUTO DIFF WBC: CPT

## 2020-07-14 PROCEDURE — 80307 DRUG TEST PRSMV CHEM ANLYZR: CPT

## 2020-07-14 PROCEDURE — 1240000000 HC EMOTIONAL WELLNESS R&B

## 2020-07-14 PROCEDURE — U0002 COVID-19 LAB TEST NON-CDC: HCPCS

## 2020-07-14 PROCEDURE — 81003 URINALYSIS AUTO W/O SCOPE: CPT

## 2020-07-14 PROCEDURE — G0480 DRUG TEST DEF 1-7 CLASSES: HCPCS

## 2020-07-14 PROCEDURE — 93005 ELECTROCARDIOGRAM TRACING: CPT | Performed by: EMERGENCY MEDICINE

## 2020-07-14 PROCEDURE — 99285 EMERGENCY DEPT VISIT HI MDM: CPT

## 2020-07-14 PROCEDURE — 80053 COMPREHEN METABOLIC PANEL: CPT

## 2020-07-14 RX ORDER — LORAZEPAM 2 MG/ML
1 INJECTION INTRAMUSCULAR ONCE
Status: DISCONTINUED | OUTPATIENT
Start: 2020-07-14 | End: 2020-07-24 | Stop reason: HOSPADM

## 2020-07-14 RX ORDER — ZIPRASIDONE MESYLATE 20 MG/ML
10 INJECTION, POWDER, LYOPHILIZED, FOR SOLUTION INTRAMUSCULAR ONCE
Status: DISCONTINUED | OUTPATIENT
Start: 2020-07-14 | End: 2020-07-24 | Stop reason: HOSPADM
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
## 2020-07-14 NOTE — ED NOTES
Shirt, shoes, shorts placed in locker number [address], [facility]  07/14/20 8108

## 2020-07-15 PROCEDURE — 6370000000 HC RX 637 (ALT 250 FOR IP): Performed by: NURSE PRACTITIONER

## 2020-07-15 PROCEDURE — 6360000002 HC RX W HCPCS: Performed by: PSYCHIATRY & NEUROLOGY

## 2020-07-15 PROCEDURE — 1240000000 HC EMOTIONAL WELLNESS R&B

## 2020-07-15 PROCEDURE — 6360000002 HC RX W HCPCS

## 2020-07-15 PROCEDURE — 6370000000 HC RX 637 (ALT 250 FOR IP): Performed by: PSYCHIATRY & NEUROLOGY

## 2020-07-15 PROCEDURE — 99222 1ST HOSP IP/OBS MODERATE 55: CPT | Performed by: NURSE PRACTITIONER

## 2020-07-15 RX ORDER — LORAZEPAM 2 MG/ML
2 INJECTION INTRAMUSCULAR EVERY 6 HOURS PRN
Status: DISCONTINUED | OUTPATIENT
Start: 2020-07-15 | End: 2020-07-24 | Stop reason: HOSPADM

## 2020-07-15 RX ORDER — MAGNESIUM HYDROXIDE/ALUMINUM HYDROXICE/SIMETHICONE 120; 1200; 1200 MG/30ML; MG/30ML; MG/30ML
30 SUSPENSION ORAL PRN
Status: DISCONTINUED | OUTPATIENT
Start: 2020-07-15 | End: 2020-07-24 | Stop reason: HOSPADM

## 2020-07-15 RX ORDER — DIPHENHYDRAMINE HYDROCHLORIDE 50 MG/ML
INJECTION INTRAMUSCULAR; INTRAVENOUS
Status: COMPLETED
Start: 2020-07-15 | End: 2020-07-15

## 2020-07-15 RX ORDER — HYDROXYZINE PAMOATE 25 MG/1
50 CAPSULE ORAL 3 TIMES DAILY PRN
Status: DISCONTINUED | OUTPATIENT
Start: 2020-07-15 | End: 2020-07-24 | Stop reason: HOSPADM

## 2020-07-15 RX ORDER — TRAZODONE HYDROCHLORIDE 50 MG/1
50 TABLET ORAL NIGHTLY PRN
Status: DISCONTINUED | OUTPATIENT
Start: 2020-07-15 | End: 2020-07-24 | Stop reason: HOSPADM

## 2020-07-15 RX ORDER — DIPHENHYDRAMINE HYDROCHLORIDE 50 MG/ML
50 INJECTION INTRAMUSCULAR; INTRAVENOUS ONCE
Status: COMPLETED | OUTPATIENT
Start: 2020-07-15 | End: 2020-07-15

## 2020-07-15 RX ORDER — LORAZEPAM 2 MG/ML
2 INJECTION INTRAMUSCULAR ONCE
Status: COMPLETED | OUTPATIENT
Start: 2020-07-15 | End: 2020-07-15

## 2020-07-15 RX ORDER — ARIPIPRAZOLE 15 MG/1
15 TABLET ORAL DAILY
Status: DISCONTINUED | OUTPATIENT
Start: 2020-07-15 | End: 2020-07-17

## 2020-07-15 RX ORDER — HALOPERIDOL 5 MG/ML
5 INJECTION INTRAMUSCULAR EVERY 6 HOURS PRN
Status: DISCONTINUED | OUTPATIENT
Start: 2020-07-15 | End: 2020-07-24 | Stop reason: HOSPADM

## 2020-07-15 RX ORDER — HALOPERIDOL 5 MG
5 TABLET ORAL EVERY 6 HOURS PRN
Status: DISCONTINUED | OUTPATIENT
Start: 2020-07-15 | End: 2020-07-24 | Stop reason: HOSPADM

## 2020-07-15 RX ORDER — ACETAMINOPHEN 325 MG/1
650 TABLET ORAL EVERY 6 HOURS PRN
Status: DISCONTINUED | OUTPATIENT
Start: 2020-07-15 | End: 2020-07-24 | Stop reason: HOSPADM

## 2020-07-15 RX ORDER — POLYETHYLENE GLYCOL 3350 17 G
2 POWDER IN PACKET (EA) ORAL PRN
Status: DISCONTINUED | OUTPATIENT
Start: 2020-07-15 | End: 2020-07-24 | Stop reason: HOSPADM

## 2020-07-15 RX ORDER — DIVALPROEX SODIUM 250 MG/1
500 TABLET, DELAYED RELEASE ORAL EVERY 12 HOURS SCHEDULED
Status: DISCONTINUED | OUTPATIENT
Start: 2020-07-15 | End: 2020-07-22

## 2020-07-15 RX ADMIN — HYDROXYZINE PAMOATE 50 MG: 25 CAPSULE ORAL at 20:38

## 2020-07-15 RX ADMIN — DIPHENHYDRAMINE HYDROCHLORIDE 50 MG: 50 INJECTION INTRAMUSCULAR; INTRAVENOUS at 05:52

## 2020-07-15 RX ADMIN — DIVALPROEX SODIUM 500 MG: 250 TABLET, DELAYED RELEASE ORAL at 11:16

## 2020-07-15 RX ADMIN — DIVALPROEX SODIUM 500 MG: 250 TABLET, DELAYED RELEASE ORAL at 20:38

## 2020-07-15 RX ADMIN — ARIPIPRAZOLE 15 MG: 15 TABLET ORAL at 14:07

## 2020-07-15 RX ADMIN — HALOPERIDOL LACTATE 5 MG: 5 INJECTION, SOLUTION INTRAMUSCULAR at 05:55

## 2020-07-15 RX ADMIN — LORAZEPAM 2 MG: 2 INJECTION INTRAMUSCULAR; INTRAVENOUS at 05:54

## 2020-07-15 RX ADMIN — DIPHENHYDRAMINE HYDROCHLORIDE 50 MG: 50 INJECTION, SOLUTION INTRAMUSCULAR; INTRAVENOUS at 05:52

## 2020-07-15 RX ADMIN — TRAZODONE HYDROCHLORIDE 50 MG: 50 TABLET ORAL at 20:39

## 2020-07-15 ASSESSMENT — SLEEP AND FATIGUE QUESTIONNAIRES
AVERAGE NUMBER OF SLEEP HOURS: 6
AVERAGE NUMBER OF SLEEP HOURS: 6
DO YOU USE A SLEEP AID: NO
DO YOU HAVE DIFFICULTY SLEEPING: NO
DO YOU USE A SLEEP AID: NO
DO YOU HAVE DIFFICULTY SLEEPING: NO

## 2020-07-15 ASSESSMENT — LIFESTYLE VARIABLES
HISTORY_ALCOHOL_USE: YES
HISTORY_ALCOHOL_USE: YES

## 2020-07-15 ASSESSMENT — PATIENT HEALTH QUESTIONNAIRE - PHQ9
SUM OF ALL RESPONSES TO PHQ QUESTIONS 1-9: 7
SUM OF ALL RESPONSES TO PHQ QUESTIONS 1-9: 0

## 2020-07-15 ASSESSMENT — PAIN SCALES - GENERAL
PAINLEVEL_OUTOF10: 0
PAINLEVEL_OUTOF10: 0
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
## 2020-07-15 NOTE — ED NOTES
Patient accepted to 7S, room 7521-A by Dr. Raechel Runner. Admitting notified.       Timoteo Marcelino RN  07/14/20 3874
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
## 2020-07-15 NOTE — ED NOTES
Bed: [facility]  Expected date:   Expected time:   Means of arrival:   Comments:  CRUZ Mcintyre RN  07/14/20 3260

## 2020-07-15 NOTE — GROUP NOTE
Date: 7/15/2020    Group Start Time: 1020  Group End Time: 1100  Group Topic: Psychoeducation    [facility] AC[facility]    [place], [place]                                                                        Group Therapy Note    Date: 7/15/2020    Type of Group: Psychoeducation    Wellness Binder Information  Module Name:  mindfulness meditation   Session Number:  na    Patient's Goal:  patient will be able to id simple steps to practicing mindfulness on a regular basis. Notes:  pleasant and engaged in group, able to share when prompted. Status After Intervention:  Improved    Participation Level:  Active Listener and Interactive    Participation Quality: Appropriate, Attentive, Sharing, and Supportive      Speech:  normal     Thought Process/Content: Logical      Affective Functioning: Congruent      Mood: euthymic      Level of consciousness:  Alert, Oriented x4, and Attentive      Response to Learning: Able to verbalize current knowledge/experience, Able to verbalize/acknowledge new learning, and Progressing to goal      Endings: None Reported    Modes of Intervention: Education, Support, Socialization, Exploration, and Problem-solving      Discipline Responsible: Psychoeducational Specialist      Signature:  [facility]

## 2020-07-15 NOTE — PROGRESS NOTES
Contacted Dr. Cristin Acosta for medical consult. Dr. Cristin Acosta is not available at this time and his answering service informed me he would return my call. Awaiting call to be returned.

## 2020-07-15 NOTE — GROUP NOTE
Group Therapy Note    Date: 7/15/2020    Group Start Time: 0125  Group End Time: 0155  Group Topic: Psychoeducation    [facility]    YESICA Mariscal LSW        Group Therapy Note    Attendees: 11         Patient's Goal:  Effective ways to manage anger    Notes:  Patient had adequate interaction in group    Status After Intervention:  Improved    Participation Level:  Active Listener and Interactive    Participation Quality: Appropriate, Attentive and Sharing      Speech:  normal      Thought Process/Content: Logical      Affective Functioning: Constricted/Restricted      Mood: depressed      Level of consciousness:  Alert and Attentive      Response to Learning: Progressing to goal      Endings: None Reported    Modes of Intervention: Education and Support      Discipline Responsible: /Counselor      Signature:  YESICA Mariscal LSW
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
## 2020-07-15 NOTE — PROGRESS NOTES
Attempted patients assessment. Patient attempting to pleasure self during assessment. Patient instructed to stop, and that that behavior is not appropriate. Resumed assessment, patient began again. After 3x concluded assessment with patient as he was unwilling to stop touching self. Remainder of info taken from old assessment.
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
## 2020-07-15 NOTE — PROGRESS NOTES
Group Therapy Note     Date: 7/13/2020     Group Start Time: 1130  Group End ZZAM: [id number]  Group Topic: Cognitive Skills     [facility] 1    YESICA Soto, [facility]           Group Therapy Note     Attendees: 13        Patient's Goal:  Identify positive ways to verbally communicate with others     Notes:  Patent was an active listener.     Status After Intervention:  Improved     Participation Level:  Active Listener and Interactive     Participation Quality: Appropriate, Attentive and Supportive        Speech:  normal        Thought Process/Content: Logical        Affective Functioning: Congruent        Mood: euthymic        Level of consciousness:  Alert        Response to Learning: Progressing to goal        Endings: None Reported     Modes of Intervention: Problem-solving        Discipline Responsible:         Signature:  YESICA De La Paz, [facility]
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
## 2020-07-15 NOTE — PROGRESS NOTES
Seclusion D/C at this time meds effective pt resting with eyes closed and snoring unable to debrief at this time

## 2020-07-15 NOTE — PROGRESS NOTES
Called Dr. Murray Merrill office in attempt to receive an update. His answering service informed me that doctor has been \"in and out of the office\" all day and has not been able to return consults. They informed me that he would be addressing it shortly. Will update accordingly.

## 2020-07-15 NOTE — PROGRESS NOTES
Medications verified with [facility]:    - Allopurinol (Zyloprim) - 100mg tablet - Daily    -Aripiprazole (Abilify) - 15mg tablet - Daily    -Aristada Long Acting Injection - 882mg - Last Injection 7/9/2020, ordered every 4 weeks    -Lipitor (Atorvastatin) - 40mg tablet - Daily    -Cogentin (Benzatropine) - 0.5mg - Twice Daily    - Carvedilol (Coreg) 0.25mg tablet - Twice Daily, with meals    - Dexilant - 60mg capsule - Daily    - Depakote DR - 500mg - Twice Daily    - Fluticasone (Flonase) - 50mcg/act spray - 2 sprays in each nostril Daily    - Glimepiride (Amaryl) - 4mg tablet - With Breakfast    - Insulin Degludec Christa Alken) - 40 units - Daily

## 2020-07-16 LAB
EKG ATRIAL RATE: 83 BPM
EKG P AXIS: 69 DEGREES
EKG P-R INTERVAL: 158 MS
EKG Q-T INTERVAL: 366 MS
EKG QRS DURATION: 82 MS
EKG QTC CALCULATION (BAZETT): 430 MS
EKG R AXIS: 20 DEGREES
EKG T AXIS: 63 DEGREES
EKG VENTRICULAR RATE: 83 BPM
HBA1C MFR BLD: 7.7 % (ref 4–5.6)
METER GLUCOSE: 115 MG/DL (ref 74–99)
METER GLUCOSE: 149 MG/DL (ref 74–99)
METER GLUCOSE: 93 MG/DL (ref 74–99)

## 2020-07-16 PROCEDURE — 36415 COLL VENOUS BLD VENIPUNCTURE: CPT

## 2020-07-16 PROCEDURE — 1240000000 HC EMOTIONAL WELLNESS R&B

## 2020-07-16 PROCEDURE — 6370000000 HC RX 637 (ALT 250 FOR IP): Performed by: NURSE PRACTITIONER

## 2020-07-16 PROCEDURE — 99232 SBSQ HOSP IP/OBS MODERATE 35: CPT | Performed by: NURSE PRACTITIONER

## 2020-07-16 PROCEDURE — 82962 GLUCOSE BLOOD TEST: CPT

## 2020-07-16 PROCEDURE — 93010 ELECTROCARDIOGRAM REPORT: CPT | Performed by: INTERNAL MEDICINE

## 2020-07-16 PROCEDURE — 6370000000 HC RX 637 (ALT 250 FOR IP): Performed by: PSYCHIATRY & NEUROLOGY

## 2020-07-16 PROCEDURE — 83036 HEMOGLOBIN GLYCOSYLATED A1C: CPT

## 2020-07-16 PROCEDURE — 6370000000 HC RX 637 (ALT 250 FOR IP): Performed by: INTERNAL MEDICINE

## 2020-07-16 RX ORDER — FUROSEMIDE 40 MG/1
40 TABLET ORAL DAILY
Status: DISCONTINUED | OUTPATIENT
Start: 2020-07-16 | End: 2020-07-24 | Stop reason: HOSPADM

## 2020-07-16 RX ORDER — NICOTINE POLACRILEX 4 MG
15 LOZENGE BUCCAL PRN
Status: DISCONTINUED | OUTPATIENT
Start: 2020-07-16 | End: 2020-07-24 | Stop reason: HOSPADM

## 2020-07-16 RX ORDER — ALOGLIPTIN 25 MG/1
25 TABLET, FILM COATED ORAL DAILY
Status: DISCONTINUED | OUTPATIENT
Start: 2020-07-16 | End: 2020-07-24 | Stop reason: HOSPADM

## 2020-07-16 RX ORDER — DEXTROSE MONOHYDRATE 25 G/50ML
12.5 INJECTION, SOLUTION INTRAVENOUS PRN
Status: DISCONTINUED | OUTPATIENT
Start: 2020-07-16 | End: 2020-07-24 | Stop reason: HOSPADM

## 2020-07-16 RX ORDER — SPIRONOLACTONE 25 MG/1
50 TABLET ORAL DAILY
Status: DISCONTINUED | OUTPATIENT
Start: 2020-07-16 | End: 2020-07-24 | Stop reason: HOSPADM

## 2020-07-16 RX ORDER — DEXTROSE MONOHYDRATE 50 MG/ML
100 INJECTION, SOLUTION INTRAVENOUS PRN
Status: DISCONTINUED | OUTPATIENT
Start: 2020-07-16 | End: 2020-07-24 | Stop reason: HOSPADM

## 2020-07-16 RX ORDER — INSULIN GLARGINE 100 [IU]/ML
30 INJECTION, SOLUTION SUBCUTANEOUS NIGHTLY
Status: DISCONTINUED | OUTPATIENT
Start: 2020-07-16 | End: 2020-07-24 | Stop reason: HOSPADM

## 2020-07-16 RX ADMIN — ARIPIPRAZOLE 15 MG: 15 TABLET ORAL at 09:40

## 2020-07-16 RX ADMIN — FUROSEMIDE 40 MG: 40 TABLET ORAL at 12:26

## 2020-07-16 RX ADMIN — ALOGLIPTIN 25 MG: 25 TABLET, FILM COATED ORAL at 12:25

## 2020-07-16 RX ADMIN — NICOTINE POLACRILEX 2 MG: 2 LOZENGE ORAL at 09:42

## 2020-07-16 RX ADMIN — DIVALPROEX SODIUM 500 MG: 250 TABLET, DELAYED RELEASE ORAL at 09:40

## 2020-07-16 RX ADMIN — SPIRONOLACTONE 50 MG: 25 TABLET ORAL at 09:40

## 2020-07-16 ASSESSMENT — PAIN SCALES - GENERAL: PAINLEVEL_OUTOF10: 0

## 2020-07-16 NOTE — CARE COORDINATION
THIAGO Note: d/c planning: THIAGO spoke with mom, Triston Borrego [phone], [phone]- [phone]. She states that he has been acting paranoid for a little while so would like his medication changed. She states that abilify makes him agitated. She would like to try to get guardianship of him. She states that he is usually pleasant and happy and would do anything most people would ask him to. She states there are no weapons in the home. She states he can return to her upon d/c and she has no safety concerns for him.

## 2020-07-16 NOTE — PROGRESS NOTES
Pt attended afternoon meet/greet and leisure group of YOCASTA and SPO. Pt was 1 out of 12 in attendance.

## 2020-07-16 NOTE — CONSULTS
PT SEEN AND EXAMINED. Chart reviewed. meds reviewed. D/w nursing + family as available. EXAM: IN GENERAL, NAD. AWAKE AND ALERT. ROS NEGx10 EXCEPT:   /62   Pulse 94   Temp 98.1 °F (36.7 °C) (Temporal)   Resp 18   Ht 5' 9\" (1.753 m)   Wt 253 lb (114.8 kg)   SpO2 97%   BMI 37.36 kg/m²   GEN: A+O NAD. HEENT: NCAT. EOMI. NICK  NECK: NO JVD. TRACH MIDLINE. NO BRUITS. NO THYROMEGALY. LUNGS: CTA BL NO RALES, RHONCHI OR WHEEZES. GOOD EXCURSION. CV: Regular rate and rhythm, NO Murmurs, Rubs, Or gallops  ABD: Soft. Nontender. Normal bowel sounds. No organomegaly  EXT:No clubbing cyanosis or edema  Neuro: Alert and oriented x 3. No focal motor deficits. No sensory deficits. Reflexes appear intact.   Labs/data reviewedLABS: CBC with Differential:    Lab Results   Component Value Date    WBC 6.5 07/14/2020    RBC 5.00 07/14/2020    HGB 13.8 07/14/2020    HCT 43.9 07/14/2020     07/14/2020    MCV 87.8 07/14/2020    MCH 27.6 07/14/2020    MCHC 31.4 07/14/2020    RDW 16.9 07/14/2020    SEGSPCT 63 03/06/2014    BANDSPCT 1 10/25/2016    LYMPHOPCT 38.0 07/14/2020    MONOPCT 7.8 07/14/2020    MYELOPCT 1 07/01/2013    EOSPCT 1 10/08/2010    BASOPCT 0.3 07/14/2020    MONOSABS 0.50 07/14/2020    LYMPHSABS 2.45 07/14/2020    EOSABS 0.07 07/14/2020    BASOSABS 0.02 07/14/2020     Platelets:    Lab Results   Component Value Date     07/14/2020     CMP:    Lab Results   Component Value Date     07/14/2020    K 4.4 07/14/2020    K 5.3 04/27/2020     07/14/2020    CO2 24 07/14/2020    BUN 14 07/14/2020    CREATININE 1.3 07/14/2020    GFRAA >60 07/14/2020    LABGLOM >60 07/14/2020    GLUCOSE 156 07/14/2020    GLUCOSE 129 01/27/2012    PROT 6.4 07/14/2020    LABALBU 3.9 07/14/2020    LABALBU 4.6 09/14/2011    CALCIUM 9.5 07/14/2020    BILITOT 0.3 07/14/2020    ALKPHOS 70 07/14/2020    AST 22 07/14/2020    ALT 15 07/14/2020     Magnesium:    Lab Results   Component Value Date    MG 1.9 06/13/2011 LDH:  No results found for: LDH  PT/INR:    Lab Results   Component Value Date    PROTIME 12.1 03/20/2019    PROTIME 12.3 12/02/2011    INR 1.0 03/20/2019     Last 3 Troponin:    Lab Results   Component Value Date    TROPONINI <0.01 04/27/2020    TROPONINI <0.01 12/11/2019    TROPONINI <0.01 08/04/2014     ABG:  No results found for: PH, PCO2, PO2, HCO3, BE, THGB, TCO2, O2SAT  IRON:  No results found for: IRON  IMAGING    No results found.     Medications:    Scheduled Meds:   divalproex  500 mg Oral 2 times per day    ARIPiprazole  15 mg Oral Daily    [START ON 8/8/2020] ARIPiprazole lauroxil  882 mg Intramuscular Q30 Days    ziprasidone  10 mg Intramuscular Once    LORazepam  1 mg Intramuscular Once       Continuous Infusions:    PRN Meds:acetaminophen, hydrOXYzine, haloperidol lactate **OR** haloperidol, traZODone, magnesium hydroxide, aluminum & magnesium hydroxide-simethicone, nicotine polacrilex, LORazepam    A/P:      Patient Active Problem List   Diagnosis    [facility]oa[facility], [facility] type [facility]    Schizophrenia    Chest pain    Cardiomyopathy, dilated, nonischemic ([facility]    H/O CHF    Automatic implantable cardioverter-defibrillator in situ    HTN (hypertension)    DM (diabetes mellitus) ([facility]    Morbid obesity due to excess calories ([facility]    RIKKI (obstructive sleep apnea)    GERD (gastroesophageal reflux disease)    Hiatal hernia    Tobacco abuse    Hyperlipemia    Paranoid schizophrenia ([facility]    Type 2 diabetes mellitus, without long-term current use of insulin (HCC)    Acute psychosis ([facility]    Hypoxia      home meds:  Insulin Degludec 100 UNIT/ML SOPN Inject 40 Units into the skin daily 3 mL 3    Insulin Pen Needle (B-D ULTRAFINE III SHORT PEN) 31G X 8 MM MISC USE ONE DAILY 100 each 1   atorvastatin (LIPITOR) 40 MG tablet TAKE 1 TABLET BY MOUTH DAILY 30 tablet 3   allopurinol (ZYLOPRIM) 100 MG tablet TAKE 1 TABLET BY MOUTH DAILY 30 tablet 3   glimepiride (AMARYL) 4 MG tablet TAKE 1 TABLET BY MOUTH EVERY MORNING BEFORE BREAKFAST 30 tablet 3   fluticasone (FLONASE) 50 MCG/ACT nasal spray INSTILL 2 SPRAYS IN EACH NOSTRIL DAILY 16 g 1   loratadine (CLARITIN REDITABS) 10 MG dissolvable tablet Take 1 tablet by mouth daily 30 tablet 2   TRADJENTA 5 MG tablet TAKE 1 TABLET BY MOUTH DAILY 30 tablet 3   metFORMIN (GLUCOPHAGE) 1000 MG tablet TAKE 1 TABLET BY MOUTH TWICE A DAY WITH MEALS 60 tablet 3   divalproex (DEPAKOTE) 500 MG DR tablet Take 2 tablets by mouth every 12 hours (Patient taking differently: Take 1,500 mg by mouth every 12 hours Taking 4 pills daily) 120 tablet 0   spironolactone (ALDACTONE) 50 MG tablet Take 1 tablet by mouth daily 90 tablet 0   paliperidone palmitate ER (INVEGA SUSTENNA) 156 MG/ML MEGHA IM injection Inject 156 mg into the muscle every 30 days 1.2 mg 0   nicotine (NICODERM CQ) 21 MG/24HR Place 1 patch onto the skin daily 30 patch 0   furosemide (LASIX) 40 MG tablet TAKE 1 TABLET BY MOUTH DAILY 30 tablet 2   traZODone (DESYREL) 50 MG tablet           PLAN:Will restart Lasix and Aldactone and smaller dose of long-acting insulin.   Monitor labs  Will Follow

## 2020-07-16 NOTE — PROGRESS NOTES
Spiritual Support Group Note    Number of Participants in Group:    11                    Time: 2    Goal: Relief from isolation and loneliness             Maylin Sharing             Self-understanding and gain insight              Acceptance and belonging            Recognize they are not alone                Socialization             Empowerment       Encouragement    Topic:  [x] Spiritual Wellness and Self Care                  [] Hope                     [] Connecting with Divine/Others        [] Thankfulness and Gratitude               []  Meaningfulness and Purpose               [] Forgiveness               [] Peace               [] Connect to [employer]      [] Other    Participation Level:   [x] Active Listener   [] Minimal   [] Monopolizing   [] Interactive   [] No Participation   []  Other:     Attention:   [x] Alert   [] Distractible   [] Drowsy   [] Poor   [] Other:    Manner:   [x] Cooperative   [] Suspicious   [] Withdrawn   [] Guarded   [] Irritable   [] Inhospitable   [] Other:     Others Comments from Group:

## 2020-07-16 NOTE — PLAN OF CARE
Patient denies homicidal ideations and hallucinations at this time. Admits to suicidal ideations. When asked if he had a plan, he responded \"It's too late for that\". Presents flat with underlying irritability but is cooperative. Patient is isolative to room thus far this shift. Medications taken without issue. No complaints or concerns voiced at this time. No unit problems reported. Will continue to observe and support.      Problem: Altered Mood, Psychotic Behavior:  Goal: Able to verbalize decrease in frequency and intensity of hallucinations  Description: Able to verbalize decrease in frequency and intensity of hallucinations  7/15/2020 2114 by Luan Coyle RN  Outcome: Met This Shift  7/15/2020 1119 by Aiyana Rtohman RN  Outcome: Ongoing     Problem: Altered Mood, Psychotic Behavior:  Goal: Able to verbalize reality based thinking  Description: Able to verbalize reality based thinking  7/15/2020 2114 by Luan Coyle RN  Outcome: Not Met This Shift  7/15/2020 1119 by Aiyana Rothman RN  Outcome: Ongoing

## 2020-07-16 NOTE — PROGRESS NOTES
Group Therapy Note     Date: 7/16/2020     Group Start Time: 0110  Group End Time: 0200  Group Topic: Cognitive Skills     [facility]    YESICA Smith, LSW           Group Therapy Note     Attendees: 15        Patient's Goal:  Identify the definition of forgiveness and how the pt can use it in their personal lives     Notes:  Noa was an active listener.     Status After Intervention:  Improved     Participation Level:  Active Listener and Interactive     Participation Quality: Appropriate, Attentive and Supportive        Speech:  normal        Thought Process/Content: Logical        Affective Functioning: Congruent        Mood: euthymic        Level of consciousness:  Alert        Response to Learning: Progressing to goal        Endings: None Reported     Modes of Intervention: Problem-solving        Discipline Responsible:         Signature:  YESICA De La Paz, [facility]

## 2020-07-16 NOTE — PLAN OF CARE
Patient is isolative and evasive with underlying irritability. Patient reports to no depression or anxiety but presents depressed, saddened, with impaired concentration. Patient also presents paranoid and suspicious of others. He is medication compliant. Patient denies suicidal ideations. When asked if he was homicidal he replied, \"No comment. \" Patient does admit to auditory hallucinations, reports to hearing unknown voices mumbling and making noises. Patient is no behavioral issues. Patient insight is poor. Distractible at times. Poor concentration. Continues to believe people are breaking into his mother's home.  Will monitor closely
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
## 2020-07-17 LAB
METER GLUCOSE: 126 MG/DL (ref 74–99)
METER GLUCOSE: 157 MG/DL (ref 74–99)
METER GLUCOSE: 186 MG/DL (ref 74–99)
METER GLUCOSE: 215 MG/DL (ref 74–99)

## 2020-07-17 PROCEDURE — 6370000000 HC RX 637 (ALT 250 FOR IP): Performed by: INTERNAL MEDICINE

## 2020-07-17 PROCEDURE — 1240000000 HC EMOTIONAL WELLNESS R&B

## 2020-07-17 PROCEDURE — 6370000000 HC RX 637 (ALT 250 FOR IP): Performed by: NURSE PRACTITIONER

## 2020-07-17 PROCEDURE — 82962 GLUCOSE BLOOD TEST: CPT

## 2020-07-17 PROCEDURE — 99232 SBSQ HOSP IP/OBS MODERATE 35: CPT | Performed by: NURSE PRACTITIONER

## 2020-07-17 RX ORDER — ARIPIPRAZOLE 10 MG/1
20 TABLET ORAL DAILY
Status: DISCONTINUED | OUTPATIENT
Start: 2020-07-18 | End: 2020-07-17

## 2020-07-17 RX ADMIN — FUROSEMIDE 40 MG: 40 TABLET ORAL at 08:33

## 2020-07-17 RX ADMIN — DIVALPROEX SODIUM 500 MG: 250 TABLET, DELAYED RELEASE ORAL at 08:33

## 2020-07-17 RX ADMIN — DIVALPROEX SODIUM 500 MG: 250 TABLET, DELAYED RELEASE ORAL at 20:58

## 2020-07-17 RX ADMIN — ARIPIPRAZOLE 15 MG: 15 TABLET ORAL at 08:33

## 2020-07-17 RX ADMIN — SPIRONOLACTONE 50 MG: 25 TABLET ORAL at 08:33

## 2020-07-17 RX ADMIN — ALOGLIPTIN 25 MG: 25 TABLET, FILM COATED ORAL at 08:33

## 2020-07-17 ASSESSMENT — PAIN SCALES - GENERAL: PAINLEVEL_OUTOF10: 0

## 2020-07-17 NOTE — PROGRESS NOTES
Gordon Dunn denies SI,HI, or any Hallucinations at this time. Gordon Dunn denies any depression or anxiety. In the morning he has been quiet and flat. Later he has been overly sexually preoccupied. He has been found in [employer] and also in his room during rounds to be sexually preoccupied. Uriel Zapata NP notified. Patient takes his meds and attends groups. Will continue to monitor.

## 2020-07-17 NOTE — PROGRESS NOTES
Patient continues to display sexually inappropriate behavior thru the day today. Patient attended group and sharing his best coping skill is ejaculating. Reminded patient that is inappropriate to share that. Witnessed patient walking past a female patient and reaching out and tapping her boob. Talked to patient regarding keeping hands to self. Other patients came to this staff sharing that patient is in [employer] fondling himself, again instructed patient that is not appropriate behavior and needs to return to his room. Observed patient in afternoon talking with same patient he tapped earlier. This patient had both hands in pants touching self when talking to other female patient. This staff pulled female patient away from patient. Informed patients RN of these observed behaviors thru-out the day.

## 2020-07-17 NOTE — PROGRESS NOTES
Attended afternoon meet and greet. Educated on afternoon updates of staff. Patient participated in family maribel morris. Patient 1 of 12 in attendance.

## 2020-07-17 NOTE — PROGRESS NOTES
Anxious  Motor Activity:Normal: No  Motor Activity: Decreased  Interview Behavior: Cooperative, Irritable, Evasive  Preception: [place] to Person, [place] to Time, [place] to Place, [place] to Situation  Attention:Normal: No  Attention: Distractible, Unable to Concentrate  Thought Processes: (impaired)  Thought Content:Normal: No  Thought Content: Delusions, Paranoia  Hallucinations: Auditory (Comment)(hearing voices, mumbling)  Delusions: No  Delusions: Other(See Comment), Persecution(paranoia)  Memory:Normal: No  Memory: Poor Recent, Poor Remote  Insight and Judgment: No  Insight and Judgment: Poor Insight, Poor Judgment  Present Suicidal Ideation: No  Present Homicidal Ideation: Other(See comment)(\"no comment\")    Daily Assessment Last Entry:   Daily Sleep (WDL): Within Defined Limits         Patient Currently in Pain: Other (comment)(Resting in bed apparently asleep)  Daily Nutrition (WDL): Within Defined Limits    Patient Monitoring:  Frequency of Checks: 4 times per hour, close    Psychiatric Symptoms:   Depression Symptoms  Depression Symptoms: Impaired concentration, Isolative, Loss of interest  Anxiety Symptoms  Anxiety Symptoms: Generalized  Avelina Symptoms  Avelina Symptoms: No problems reported or observed.      Psychosis Symptoms  Delusion Type: Paranoid, Persecutory    Suicide Risk CSSR-S:  1) Within the past month, have you wished you were dead or wished you could go to sleep and not wake up? : No  2) Have you actually had any thoughts of killing yourself? : No  6) Have you ever done anything, started to do anything, or prepared to do anything to end your life?: No  Change in Result none Change in Plan of care none      EDUCATION:   Learner Progress Toward Treatment Goals: Reviewed results and recommendations of this team    Method: Small group    Outcome: Verbalized understanding    PATIENT GOALS: no goal     PLAN/TREATMENT RECOMMENDATIONS UPDATE: continue current treatment plan    GOALS UPDATE:   Time frame for Short-Term Goals:  3-5 days      Jerel Noe RN
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
## 2020-07-17 NOTE — PLAN OF CARE
Problem: Anger Management/Homicidal Ideation:  Goal: Ability to verbalize frustrations and anger appropriately will improve  Description: Ability to verbalize frustrations and anger appropriately will improve  7/17/2020 1640 by Emma Cannon RN  Outcome: Ongoing  7/17/2020 0524 by Praveen Heard RN  Outcome: Ongoing  Goal: Absence of angry outbursts  Description: Absence of angry outbursts  7/17/2020 1640 by Emma Cannon RN  Outcome: Ongoing  7/17/2020 0524 by Praveen Heard RN  Outcome: Met This Shift     Problem: Altered Mood, Psychotic Behavior:  Goal: Able to verbalize decrease in frequency and intensity of hallucinations  Description: Able to verbalize decrease in frequency and intensity of hallucinations  7/17/2020 1640 by Emma Cannon RN  Outcome: Ongoing  7/17/2020 0524 by Praveen Heard RN  Outcome: Ongoing  Goal: Able to verbalize reality based thinking  Description: Able to verbalize reality based thinking  Outcome: Ongoing  Goal: Compliance with prescribed medication regimen will improve  Description: Compliance with prescribed medication regimen will improve  Outcome: Ongoing    Isha Castillo denies SI,HI, or any Hallucinations at this time. Isha Castillo denies any depression or anxiety. In the morning he has been quiet and flat. Later he has been overly sexually preoccupied. He has been found in [employer] and also in his room during rounds to be sexually preoccupied. Americo Hughes NP notified. Patient takes his meds and attends groups. Will continue to monitor. Statement Selected

## 2020-07-17 NOTE — GROUP NOTE
Date: 7/17/2020    Group Start Time: 1020  Group End Time: 1100  Group Topic: Psychoeducation    [facility]  [address] [place], [place]                                                                        Group Therapy Note    Date: 7/17/2020      Type of Group: Psychoeducation    Wellness Binder Information  Module Name:  triggers     Patient's Goal:  patient will be able to id daily triggers and what one can do to decrease daily stressors. Notes:  pleasant and engaged in group. Willing to share when prompted. Status After Intervention:  Improved    Participation Level:  Active Listener and Interactive    Participation Quality: Appropriate, Attentive, Sharing, and Supportive      Speech: normal       Thought Process/Content: Logical      Affective Functioning: Congruent      Mood: euthymic      Level of consciousness:  Alert, Oriented x4, and Attentive      Response to Learning: Able to verbalize/acknowledge new learning, Able to retain information, and Progressing to goal      Endings: None Reported    Modes of Intervention: Education, Support, Socialization, Exploration, and Problem-solving      Discipline Responsible: Psychoeducational Specialist      Signature:  Nba Robertson

## 2020-07-18 LAB
METER GLUCOSE: 151 MG/DL (ref 74–99)
METER GLUCOSE: 168 MG/DL (ref 74–99)
METER GLUCOSE: 174 MG/DL (ref 74–99)

## 2020-07-18 PROCEDURE — 1240000000 HC EMOTIONAL WELLNESS R&B

## 2020-07-18 PROCEDURE — 6370000000 HC RX 637 (ALT 250 FOR IP): Performed by: INTERNAL MEDICINE

## 2020-07-18 PROCEDURE — 6360000002 HC RX W HCPCS: Performed by: NURSE PRACTITIONER

## 2020-07-18 PROCEDURE — 99232 SBSQ HOSP IP/OBS MODERATE 35: CPT | Performed by: NURSE PRACTITIONER

## 2020-07-18 PROCEDURE — 6370000000 HC RX 637 (ALT 250 FOR IP): Performed by: NURSE PRACTITIONER

## 2020-07-18 PROCEDURE — 82962 GLUCOSE BLOOD TEST: CPT

## 2020-07-18 PROCEDURE — 6360000002 HC RX W HCPCS: Performed by: PSYCHIATRY & NEUROLOGY

## 2020-07-18 RX ADMIN — DIVALPROEX SODIUM 500 MG: 250 TABLET, DELAYED RELEASE ORAL at 21:08

## 2020-07-18 RX ADMIN — SPIRONOLACTONE 50 MG: 25 TABLET ORAL at 09:11

## 2020-07-18 RX ADMIN — INSULIN GLARGINE 30 UNITS: 100 INJECTION, SOLUTION SUBCUTANEOUS at 21:09

## 2020-07-18 RX ADMIN — LORAZEPAM 2 MG: 2 INJECTION INTRAMUSCULAR; INTRAVENOUS at 01:27

## 2020-07-18 RX ADMIN — ALOGLIPTIN 25 MG: 25 TABLET, FILM COATED ORAL at 09:11

## 2020-07-18 RX ADMIN — HALOPERIDOL LACTATE 5 MG: 5 INJECTION, SOLUTION INTRAMUSCULAR at 01:27

## 2020-07-18 RX ADMIN — DIVALPROEX SODIUM 500 MG: 250 TABLET, DELAYED RELEASE ORAL at 09:11

## 2020-07-18 RX ADMIN — FUROSEMIDE 40 MG: 40 TABLET ORAL at 09:11

## 2020-07-18 ASSESSMENT — PAIN SCALES - GENERAL: PAINLEVEL_OUTOF10: 0

## 2020-07-18 NOTE — PROGRESS NOTES
Patient resting quiet to self at this time, respirations are even and unlabored, no signs or symptoms of distress or discomfort. PRN medications given this shift for agitation. Staff will continue to conduct environmental rounds to ensure the safety of everyone on the unit. Staff will provide support and interventions as requested or required.

## 2020-07-18 NOTE — GROUP NOTE
Notes:  Shared goal for the day as to live and stay strong. Group Therapy Note    Date: 7/18/2020    Group Start Time: 1115  Group End Time: 1200  Group Topic: Psychoeducation    [facility]  [address] [place], [place]        Group Therapy Note  Date: 7/18/2020  Number of Participants: 16    Type of Group: Psychoeducation    Wellness Binder Information  Module Name: effects of stress     Patient's Goal:  patient will be able to id all the effects of stress and what he/she can do to prevent. Notes:  pleasant and sharing in group     Status After Intervention:  Improved    Participation Level:  Active Listener    Participation Quality: Appropriate, Attentive, Sharing, and Supportive      Speech:normal      Thought Process/Content: Logical      Affective Functioning: Congruent      Mood: euthymic      Level of consciousness:  Alert, Oriented x4, and Attentive      Response to Learning: Able to verbalize/acknowledge new learning, Able to retain information, and Progressing to goal      Endings: None Reported    Modes of Intervention: Education, Support, Socialization, Exploration, and Problem-solving      Discipline Responsible: Psychoeducational Specialist      Signature:  Jacob Kee
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
## 2020-07-18 NOTE — PROGRESS NOTES
Patient hitting walls and shadow boxing. Could not redirect, [employer] contacted and present as patient was medicated.

## 2020-07-19 LAB
METER GLUCOSE: 111 MG/DL (ref 74–99)
METER GLUCOSE: 120 MG/DL (ref 74–99)
METER GLUCOSE: 137 MG/DL (ref 74–99)
METER GLUCOSE: 156 MG/DL (ref 74–99)

## 2020-07-19 PROCEDURE — 6370000000 HC RX 637 (ALT 250 FOR IP): Performed by: NURSE PRACTITIONER

## 2020-07-19 PROCEDURE — 6370000000 HC RX 637 (ALT 250 FOR IP): Performed by: INTERNAL MEDICINE

## 2020-07-19 PROCEDURE — 6360000002 HC RX W HCPCS: Performed by: NURSE PRACTITIONER

## 2020-07-19 PROCEDURE — 99232 SBSQ HOSP IP/OBS MODERATE 35: CPT | Performed by: NURSE PRACTITIONER

## 2020-07-19 PROCEDURE — 6360000002 HC RX W HCPCS: Performed by: PSYCHIATRY & NEUROLOGY

## 2020-07-19 PROCEDURE — 6370000000 HC RX 637 (ALT 250 FOR IP): Performed by: PSYCHIATRY & NEUROLOGY

## 2020-07-19 PROCEDURE — 6360000002 HC RX W HCPCS

## 2020-07-19 PROCEDURE — 1240000000 HC EMOTIONAL WELLNESS R&B

## 2020-07-19 PROCEDURE — 82962 GLUCOSE BLOOD TEST: CPT

## 2020-07-19 RX ORDER — PAROXETINE 10 MG/1
10 TABLET, FILM COATED ORAL DAILY
Status: DISCONTINUED | OUTPATIENT
Start: 2020-07-19 | End: 2020-07-24 | Stop reason: HOSPADM

## 2020-07-19 RX ORDER — PALIPERIDONE 3 MG/1
3 TABLET, EXTENDED RELEASE ORAL 2 TIMES DAILY
Status: DISCONTINUED | OUTPATIENT
Start: 2020-07-19 | End: 2020-07-24 | Stop reason: HOSPADM

## 2020-07-19 RX ORDER — DIPHENHYDRAMINE HYDROCHLORIDE 50 MG/ML
INJECTION INTRAMUSCULAR; INTRAVENOUS
Status: COMPLETED
Start: 2020-07-19 | End: 2020-07-19

## 2020-07-19 RX ORDER — DIPHENHYDRAMINE HYDROCHLORIDE 50 MG/ML
50 INJECTION INTRAMUSCULAR; INTRAVENOUS ONCE
Status: COMPLETED | OUTPATIENT
Start: 2020-07-19 | End: 2020-07-19

## 2020-07-19 RX ADMIN — DIPHENHYDRAMINE HYDROCHLORIDE 50 MG: 50 INJECTION, SOLUTION INTRAMUSCULAR; INTRAVENOUS at 19:27

## 2020-07-19 RX ADMIN — ALOGLIPTIN 25 MG: 25 TABLET, FILM COATED ORAL at 09:01

## 2020-07-19 RX ADMIN — LORAZEPAM 2 MG: 2 INJECTION INTRAMUSCULAR; INTRAVENOUS at 19:28

## 2020-07-19 RX ADMIN — PAROXETINE 10 MG: 10 TABLET, FILM COATED ORAL at 13:04

## 2020-07-19 RX ADMIN — HALOPERIDOL LACTATE 5 MG: 5 INJECTION, SOLUTION INTRAMUSCULAR at 19:28

## 2020-07-19 RX ADMIN — PALIPERIDONE 3 MG: 3 TABLET, EXTENDED RELEASE ORAL at 13:04

## 2020-07-19 RX ADMIN — PALIPERIDONE 3 MG: 3 TABLET, EXTENDED RELEASE ORAL at 21:36

## 2020-07-19 RX ADMIN — TRAZODONE HYDROCHLORIDE 50 MG: 50 TABLET ORAL at 21:36

## 2020-07-19 RX ADMIN — DIPHENHYDRAMINE HYDROCHLORIDE 50 MG: 50 INJECTION INTRAMUSCULAR; INTRAVENOUS at 19:27

## 2020-07-19 RX ADMIN — INSULIN GLARGINE 30 UNITS: 100 INJECTION, SOLUTION SUBCUTANEOUS at 21:36

## 2020-07-19 RX ADMIN — SPIRONOLACTONE 50 MG: 25 TABLET ORAL at 09:01

## 2020-07-19 RX ADMIN — DIVALPROEX SODIUM 500 MG: 250 TABLET, DELAYED RELEASE ORAL at 09:01

## 2020-07-19 RX ADMIN — FUROSEMIDE 40 MG: 40 TABLET ORAL at 09:01

## 2020-07-19 RX ADMIN — DIVALPROEX SODIUM 500 MG: 250 TABLET, DELAYED RELEASE ORAL at 21:36

## 2020-07-19 ASSESSMENT — PAIN SCALES - GENERAL: PAINLEVEL_OUTOF10: 0

## 2020-07-19 NOTE — PROGRESS NOTES
Group Therapy Note    Date: 7/19/2020  Start Time:  11:15  End Time:  12:00  Number of Participants 21    Type of Group: Recovery    Wellness Binder Information  Module Name:   Anger Mangement  Session Number:      Patient's Goal:  Learn  The myths, underlying assumptions, warning signs , triggers and ways to cool down  . Notes: Pt will be able to acknowledge and verbalize new learning.     Status After Intervention:  Improved    Participation Level: Interactive    Participation Quality: Attentive and Sharing      Speech:  Normal      Thought Process/Content: Logical      Affective Functioning: Congruent      Mood: anxious and depressed      Level of consciousness:  Oriented x4 and Attentive      Response to Learning: Able to verbalize current knowledge/experience      Endings: None Reported    Modes of Intervention: Support and Socialization      Discipline Responsible: /Counselor      Signature:  Diego Bernstein, [address] [place] [address], [address] [place] [address]  [phone]

## 2020-07-19 NOTE — PROGRESS NOTES
Patient resting quiet to self at this time, respirations are even and unlabored, no signs or symptoms of distress or discomfort. No PRN medications given thus far this shift. Staff will continue to conduct environmental rounds to ensure the safety of everyone on the unit. Staff will provide support and interventions as requested or required. this shift. Staff will continue to conduct environmental rounds to ensure the safety of everyone on the unit. Staff will provide support and interventions as requested or required.

## 2020-07-19 NOTE — GROUP NOTE
Group Therapy Note    Date: 7/18/2020    Group Start Time: 2000  Group End Time: 2030  Group Topic: Healthy Living/Wellness    [facility]  [address], [place]        Group Therapy Note    Attendees: 14/24
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
## 2020-07-20 LAB
METER GLUCOSE: 128 MG/DL (ref 74–99)
METER GLUCOSE: 182 MG/DL (ref 74–99)
METER GLUCOSE: 202 MG/DL (ref 74–99)
METER GLUCOSE: 82 MG/DL (ref 74–99)

## 2020-07-20 PROCEDURE — 6370000000 HC RX 637 (ALT 250 FOR IP): Performed by: NURSE PRACTITIONER

## 2020-07-20 PROCEDURE — 1240000000 HC EMOTIONAL WELLNESS R&B

## 2020-07-20 PROCEDURE — 99232 SBSQ HOSP IP/OBS MODERATE 35: CPT | Performed by: NURSE PRACTITIONER

## 2020-07-20 PROCEDURE — 6370000000 HC RX 637 (ALT 250 FOR IP): Performed by: INTERNAL MEDICINE

## 2020-07-20 PROCEDURE — 82962 GLUCOSE BLOOD TEST: CPT

## 2020-07-20 RX ORDER — ARIPIPRAZOLE LAUROXIL 882 MG/3.2ML
882 INJECTION, SUSPENSION, EXTENDED RELEASE INTRAMUSCULAR ONCE
Status: ON HOLD | COMMUNITY
End: 2020-07-24 | Stop reason: HOSPADM

## 2020-07-20 RX ORDER — BENZTROPINE MESYLATE 0.5 MG/1
0.5 TABLET ORAL 2 TIMES DAILY
Status: ON HOLD | COMMUNITY
End: 2021-02-05 | Stop reason: SDUPTHER

## 2020-07-20 RX ADMIN — SPIRONOLACTONE 50 MG: 25 TABLET ORAL at 09:56

## 2020-07-20 RX ADMIN — PALIPERIDONE 3 MG: 3 TABLET, EXTENDED RELEASE ORAL at 09:56

## 2020-07-20 RX ADMIN — FUROSEMIDE 40 MG: 40 TABLET ORAL at 09:56

## 2020-07-20 RX ADMIN — PALIPERIDONE 3 MG: 3 TABLET, EXTENDED RELEASE ORAL at 20:11

## 2020-07-20 RX ADMIN — INSULIN GLARGINE 30 UNITS: 100 INJECTION, SOLUTION SUBCUTANEOUS at 20:11

## 2020-07-20 RX ADMIN — ALOGLIPTIN 25 MG: 25 TABLET, FILM COATED ORAL at 09:56

## 2020-07-20 RX ADMIN — PAROXETINE 10 MG: 10 TABLET, FILM COATED ORAL at 09:56

## 2020-07-20 RX ADMIN — DIVALPROEX SODIUM 500 MG: 250 TABLET, DELAYED RELEASE ORAL at 20:11

## 2020-07-20 RX ADMIN — DIVALPROEX SODIUM 500 MG: 250 TABLET, DELAYED RELEASE ORAL at 09:56

## 2020-07-20 ASSESSMENT — PAIN SCALES - GENERAL
PAINLEVEL_OUTOF10: 0
PAINLEVEL_OUTOF10: 0

## 2020-07-20 NOTE — PLAN OF CARE
Pt is stable presently. Pt denies suicidal or homicidal ideations. Pt denies hallucinations. Pt does not report a goal.  Pt is having appropriate behavior at this time. Pt has been reminded of behavior expectations. Will follow and monitor.

## 2020-07-20 NOTE — PROGRESS NOTES
children: Not on file    Years of education: Not on file    Highest education level: Not on file   Occupational History    Not on file   Social Needs    Financial resource strain: Not on file    Food insecurity     Worry: Not on file     Inability: Not on file    Transportation needs     Medical: Not on file     Non-medical: Not on file   Tobacco Use    Smoking status: Current Every Day Smoker     Packs/day: 0.75     Years: 26.00     Pack years: 19.50     Types: Cigarettes    Smokeless tobacco: Never Used    Tobacco comment: currently 0.5 ppd, 10/10/18   Substance and Sexual Activity    Alcohol use: Yes     Alcohol/week: 1.0 standard drinks     Types: 1 Cans of beer per week     Frequency: Monthly or less     Binge frequency: Less than monthly     Comment: drinks occ beer and 2 coffee daily    Drug use: No     Types: Marijuana     Comment: past use of marijuana;  none since 1999    Sexual activity: Not on file   Lifestyle    Physical activity     Days per week: Not on file     Minutes per session: Not on file    Stress: Not on file   Relationships    Social connections     Talks on phone: Not on file     Gets together: Not on file     Attends Protestant service: Not on file     Active member of club or organization: Not on file     Attends meetings of clubs or organizations: Not on file     Relationship status: Not on file    Intimate partner violence     Fear of current or ex partner: Not on file     Emotionally abused: Not on file     Physically abused: Not on file     Forced sexual activity: Not on file   Other Topics Concern    Not on file   Social History Narrative    Drinks 1 cup of coffee daily and occ energy drink           ROS:  [x] All negative/unchanged except if checked.  Explain positive(checked items) below:  [] Constitutional  [] Eyes  [] Ear/Nose/Mouth/Throat  [] Respiratory  [] CV  [] GI  []   [] Musculoskeletal  [] Skin/Breast  [] Neurological  [] Endocrine  [] Heme/Lymph  [] Allergic/Immunologic    Explanation:     MEDICATIONS:    Current Facility-Administered Medications:     PARoxetine (PAXIL) tablet 10 mg, 10 mg, Oral, Daily, ISSA Paz CNP, 10 mg at 07/20/20 0071    paliperidone (INVEGA) extended release tablet 3 mg, 3 mg, Oral, BID, ISSA Paz CNP, 3 mg at 07/20/20 0956    furosemide (LASIX) tablet 40 mg, 40 mg, Oral, Daily, Niall Carter MD, 40 mg at 07/20/20 0956    spironolactone (ALDACTONE) tablet 50 mg, 50 mg, Oral, Daily, Niall Carter MD, 50 mg at 07/20/20 0956    insulin glargine (LANTUS) injection vial 30 Units, 30 Units, Subcutaneous, Nightly, Niall Carter MD, 30 Units at 07/19/20 2136    alogliptin (NESINA) tablet 25 mg, 25 mg, Oral, Daily, Lonnie Low MD, 25 mg at 07/20/20 0956    glucose (GLUTOSE) 40 % oral gel 15 g, 15 g, Oral, PRN, Niall Carter MD    dextrose 50 % IV solution, 12.5 g, Intravenous, PRN, Niall Carter MD    glucagon (rDNA) injection 1 mg, 1 mg, Intramuscular, PRN, Niall Carter MD    dextrose 5 % solution, 100 mL/hr, Intravenous, PRN, Niall Carter MD    acetaminophen (TYLENOL) tablet 650 mg, 650 mg, Oral, Q6H PRN, Cm Bernard MD    hydrOXYzine (VISTARIL) capsule 50 mg, 50 mg, Oral, TID PRN, Cm Bernard MD, 50 mg at 07/15/20 2038    haloperidol lactate (HALDOL) injection 5 mg, 5 mg, Intramuscular, Q6H PRN, 5 mg at 07/19/20 1928 **OR** haloperidol (HALDOL) tablet 5 mg, 5 mg, Oral, Q6H PRN, Cm Bernard MD    traZODone (DESYREL) tablet 50 mg, 50 mg, Oral, Nightly PRN, Cm Bernard MD, 50 mg at 07/19/20 2136    magnesium hydroxide (MILK OF MAGNESIA) 400 MG/5ML suspension 30 mL, 30 mL, Oral, Daily PRN, Cm Bernard MD    aluminum & magnesium hydroxide-simethicone (MAALOX) 200-200-20 MG/5ML suspension 30 mL, 30 mL, Oral, PRN, Cm Bernard MD    nicotine polacrilex (COMMIT) lozenge 2 mg, 2 mg, Oral, PRN, Cm Bernard MD, 2 mg at 07/16/20 3039    divalproex (DEPAKOTE) DR tablet 500 mg, 500 mg, Oral, 2 times per day, ISSA Liu CNP, 976 mg at 07/20/20 0956    [START ON 8/8/2020] ARIPiprazole lauroxil (ARISTADA) injection 882 mg, 882 mg, Intramuscular, Q30 Days, ISSA Reese CNP    LORazepam (ATIVAN) injection 2 mg, 2 mg, Intramuscular, C8W PRN, ISSA Liu CNP, 2 mg at 07/19/20 1928    ziprasidone (GEODON) injection 10 mg, 10 mg, Intramuscular, Once, Carissa Schuster MD    LORazepam (ATIVAN) injection 1 mg, 1 mg, Intramuscular, Once, Carissa Schuster MD      Examination:  /75   Pulse 52   Temp 97.6 °F (36.4 °C) (Temporal)   Resp 16   Ht 5' 9\" (1.753 m)   Wt 253 lb (114.8 kg)   SpO2 97%   BMI 37.36 kg/m²   Gait - steady  Medication side effects(SE):  No medication side effects to be noted, patient was educated on signs and symptoms of medication side effects instructed to notify medical staff if any signs and symptoms occur. Patient has the capacity to understand this information and what I instructed her to do if medication side effects arise. Mental Status Examination:    Level of consciousness:  within normal limits   Appearance:  well-appearing  Behavior/Motor: Tired nodding off during assessment  Attitude toward examiner:  cooperative  Speech:  spontaneous, normal rate and normal volume   Mood: \" I am okay. \"  Affect: Flat and blunted  Thought processes: Linear without flight of ideas or loose associations  Thought content: Reports auditory and visual hallucinations of seeing and hearing dead people  Cognition:  oriented to person, place, and time   Concentration intact  Memory intact  Insight poor   Judgement poor   Fund of Knowledge limited    ASSESSMENT:   Patient symptoms are:  [] Well controlled  [] Improving  [] Worsening  [x] No change      Diagnosis:  Principal Problem:    Schizoaffective disorder, bipolar type ([facility]  Resolved Problems:    * No resolved hospital problems.  *      LABS:    No results for input(s): 10 days based on patient stability            Electronically signed by ISSA Saunders CNP on 1/80/0358 at 3:25 PM

## 2020-07-20 NOTE — CARE COORDINATION
Sw called and left a VM for pt's mother Marylin Mims ([phone]) to ask what medications have worked best with the pt in the past. Sw received vm and left a message. Will try again at a later time.
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
## 2020-07-20 NOTE — GROUP NOTE
Group Therapy Note    Date: 7/20/2020    Group Start Time: 1115  Group End Time: 200  Group Topic: Cognitive Skills    [facility] 1    Rise MS YaakovW, LSW    Number of participants: 16  Type of group: Cognitive Skills  Mode of intervention: Education, Support, Socialization, Exploration, Clarifying, and Problem-solving  Topic: Saying NO  Objective:  Pt will be able to verbalize new ways of saying \"no\" assertively    Group Therapy Note      Notes: Pt was an active participant in cognitive skills group focusing on communication skills and improving interpersonal relationships. Pt was able to discuss \"saying no\" assertively. Status After Intervention:  Improved    Participation Level:  Active Listener and Interactive    Participation Quality: Appropriate, Attentive, Sharing and Supportive      Speech:  normal      Thought Process/Content: Logical      Affective Functioning: Congruent      Mood: euthymic      Level of consciousness:  Alert, Oriented x4 and Attentive      Response to Learning: Progressing to goal      Endings: None Reported    Modes of Intervention: Education, Support, Socialization, Exploration, Clarifying and Problem-solving      Discipline Responsible: /Counselor

## 2020-07-21 LAB
METER GLUCOSE: 129 MG/DL (ref 74–99)
METER GLUCOSE: 141 MG/DL (ref 74–99)
METER GLUCOSE: 152 MG/DL (ref 74–99)
METER GLUCOSE: 209 MG/DL (ref 74–99)

## 2020-07-21 PROCEDURE — 99232 SBSQ HOSP IP/OBS MODERATE 35: CPT | Performed by: NURSE PRACTITIONER

## 2020-07-21 PROCEDURE — 6370000000 HC RX 637 (ALT 250 FOR IP): Performed by: PSYCHIATRY & NEUROLOGY

## 2020-07-21 PROCEDURE — 6360000002 HC RX W HCPCS: Performed by: NURSE PRACTITIONER

## 2020-07-21 PROCEDURE — 6370000000 HC RX 637 (ALT 250 FOR IP): Performed by: NURSE PRACTITIONER

## 2020-07-21 PROCEDURE — 82962 GLUCOSE BLOOD TEST: CPT

## 2020-07-21 PROCEDURE — 1240000000 HC EMOTIONAL WELLNESS R&B

## 2020-07-21 PROCEDURE — 6370000000 HC RX 637 (ALT 250 FOR IP): Performed by: INTERNAL MEDICINE

## 2020-07-21 PROCEDURE — 6360000002 HC RX W HCPCS: Performed by: PSYCHIATRY & NEUROLOGY

## 2020-07-21 RX ADMIN — PALIPERIDONE 3 MG: 3 TABLET, EXTENDED RELEASE ORAL at 20:21

## 2020-07-21 RX ADMIN — DIVALPROEX SODIUM 500 MG: 250 TABLET, DELAYED RELEASE ORAL at 20:21

## 2020-07-21 RX ADMIN — FUROSEMIDE 40 MG: 40 TABLET ORAL at 08:30

## 2020-07-21 RX ADMIN — DIVALPROEX SODIUM 500 MG: 250 TABLET, DELAYED RELEASE ORAL at 08:30

## 2020-07-21 RX ADMIN — HYDROXYZINE PAMOATE 50 MG: 25 CAPSULE ORAL at 18:47

## 2020-07-21 RX ADMIN — INSULIN GLARGINE 30 UNITS: 100 INJECTION, SOLUTION SUBCUTANEOUS at 20:21

## 2020-07-21 RX ADMIN — LORAZEPAM 2 MG: 2 INJECTION INTRAMUSCULAR; INTRAVENOUS at 00:52

## 2020-07-21 RX ADMIN — SPIRONOLACTONE 50 MG: 25 TABLET ORAL at 08:30

## 2020-07-21 RX ADMIN — PALIPERIDONE 3 MG: 3 TABLET, EXTENDED RELEASE ORAL at 08:30

## 2020-07-21 RX ADMIN — PAROXETINE 10 MG: 10 TABLET, FILM COATED ORAL at 08:30

## 2020-07-21 RX ADMIN — ALOGLIPTIN 25 MG: 25 TABLET, FILM COATED ORAL at 08:30

## 2020-07-21 RX ADMIN — TRAZODONE HYDROCHLORIDE 50 MG: 50 TABLET ORAL at 20:21

## 2020-07-21 RX ADMIN — HALOPERIDOL LACTATE 5 MG: 5 INJECTION, SOLUTION INTRAMUSCULAR at 00:52

## 2020-07-21 ASSESSMENT — PAIN SCALES - GENERAL
PAINLEVEL_OUTOF10: 0
PAINLEVEL_OUTOF10: 0
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
## 2020-07-21 NOTE — PROGRESS NOTES
Had some difficulty with sticking self for blood sugar. Asked when he will be going home. Reading Bible today. Some compulsive behavior today.

## 2020-07-21 NOTE — GROUP NOTE
Group Therapy Note    Date: 7/21/2020    Group Start Time: 1000  Group End Time: 0365  Group Topic: Psychoeducation    [facility] 2    Shilpa Melendez, CTRS        Group Therapy Note      Number of participants: 14  Type of group: Psychoeducation  Mode of intervention: Education, Support, Socialization, Exploration, Clarifying, Problem-solving, and Activity  Topic: Positive Attitude Ball   Objective: Pt will identify 3 ways to maintain a positive attitude/perspective in recovery post discharge. Patient's Goal:  \"Keep being positive\"     Notes:  Pt was interactive during group sharing 3 ways to maintain a positive attitude in recovery. Pt gave support and feedback to others. Enjoyed positive ball activity. Status After Intervention:  Improved    Participation Level:  Active Listener and Interactive    Participation Quality: Appropriate, Attentive, Sharing and Supportive      Speech:  normal      Thought Process/Content: Logical      Affective Functioning: Blunted      Mood: irritable      Level of consciousness:  Alert, Oriented x4 and Attentive      Response to Learning: Able to verbalize current knowledge/experience, Able to verbalize/acknowledge new learning, Able to retain information, Capable of insight, Able to change behavior and Progressing to goal      Endings: None Reported    Modes of Intervention: Education, Support, Socialization, Exploration, Clarifying, Problem-solving and Activity

## 2020-07-21 NOTE — PROGRESS NOTES
Pt attended afternoon meet/greet and participated in family maribel morris. Pt was 1 out of 12 in attendance.
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
## 2020-07-21 NOTE — PROGRESS NOTES
Vistaril 50 mg po for anxiety. States not hearing \"too many\" voices. They are trying to make me jealous, envy, hateful, and spiteful toward God.

## 2020-07-22 LAB
METER GLUCOSE: 113 MG/DL (ref 74–99)
METER GLUCOSE: 117 MG/DL (ref 74–99)
METER GLUCOSE: 181 MG/DL (ref 74–99)
METER GLUCOSE: 87 MG/DL (ref 74–99)
VALPROIC ACID LEVEL: 61 MCG/ML (ref 50–100)

## 2020-07-22 PROCEDURE — 6370000000 HC RX 637 (ALT 250 FOR IP): Performed by: PSYCHIATRY & NEUROLOGY

## 2020-07-22 PROCEDURE — 36415 COLL VENOUS BLD VENIPUNCTURE: CPT

## 2020-07-22 PROCEDURE — 80164 ASSAY DIPROPYLACETIC ACD TOT: CPT

## 2020-07-22 PROCEDURE — 6370000000 HC RX 637 (ALT 250 FOR IP): Performed by: NURSE PRACTITIONER

## 2020-07-22 PROCEDURE — 82962 GLUCOSE BLOOD TEST: CPT

## 2020-07-22 PROCEDURE — 99232 SBSQ HOSP IP/OBS MODERATE 35: CPT | Performed by: NURSE PRACTITIONER

## 2020-07-22 PROCEDURE — 1240000000 HC EMOTIONAL WELLNESS R&B

## 2020-07-22 PROCEDURE — 6370000000 HC RX 637 (ALT 250 FOR IP): Performed by: INTERNAL MEDICINE

## 2020-07-22 RX ORDER — DIVALPROEX SODIUM 250 MG/1
750 TABLET, DELAYED RELEASE ORAL EVERY 12 HOURS SCHEDULED
Status: DISCONTINUED | OUTPATIENT
Start: 2020-07-22 | End: 2020-07-24 | Stop reason: HOSPADM

## 2020-07-22 RX ADMIN — DIVALPROEX SODIUM 750 MG: 250 TABLET, DELAYED RELEASE ORAL at 20:20

## 2020-07-22 RX ADMIN — PALIPERIDONE 3 MG: 3 TABLET, EXTENDED RELEASE ORAL at 20:21

## 2020-07-22 RX ADMIN — PALIPERIDONE 3 MG: 3 TABLET, EXTENDED RELEASE ORAL at 09:04

## 2020-07-22 RX ADMIN — TRAZODONE HYDROCHLORIDE 50 MG: 50 TABLET ORAL at 20:20

## 2020-07-22 RX ADMIN — HALOPERIDOL 5 MG: 5 TABLET ORAL at 23:54

## 2020-07-22 RX ADMIN — ALOGLIPTIN 25 MG: 25 TABLET, FILM COATED ORAL at 09:04

## 2020-07-22 RX ADMIN — FUROSEMIDE 40 MG: 40 TABLET ORAL at 09:04

## 2020-07-22 RX ADMIN — PAROXETINE 10 MG: 10 TABLET, FILM COATED ORAL at 09:04

## 2020-07-22 RX ADMIN — DIVALPROEX SODIUM 500 MG: 250 TABLET, DELAYED RELEASE ORAL at 09:04

## 2020-07-22 RX ADMIN — SPIRONOLACTONE 50 MG: 25 TABLET ORAL at 09:04

## 2020-07-22 RX ADMIN — HYDROXYZINE PAMOATE 50 MG: 25 CAPSULE ORAL at 20:21

## 2020-07-22 RX ADMIN — INSULIN GLARGINE 30 UNITS: 100 INJECTION, SOLUTION SUBCUTANEOUS at 20:21
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
## 2020-07-22 NOTE — GROUP NOTE
Attended community meeting shared goal for the day as to relax. Date: 7/22/2020    Group Start Time: 1649  Group End Time: 1100  Group Topic: Psychoeducation    [facility]  [id number] [place], [place]                                                                        Group Therapy Note    Date: 7/22/2020  Number of Participants: 14    Type of Group: Psychoeducation    Wellness Binder Information  Module Name:  Unhealthy vs healthy relationships   Patient's Goal:  patient will be able to id aspects of healthy relationships and how he/she can express his/her boundaries. Notes:  pleasant and engaged in group, able to share appropriately. Status After Intervention:  Improved    Participation Level:  Active Listener and Interactive    Participation Quality: Appropriate, Attentive, Sharing, and Supportive      Speech:  normal     Thought Process/Content: Logical      Affective Functioning: Congruent      Mood: euthymic      Level of consciousness:  Alert, Oriented x4, and Attentive      Response to Learning: Able to verbalize/acknowledge new learning, Able to retain information, and Progressing to goal      Endings: None Reported    Modes of Intervention: Education, Support, Socialization, Exploration, and Problem-solving      Discipline Responsible: Psychoeducational Specialist      Signature:  Braden Bruce

## 2020-07-22 NOTE — PROGRESS NOTES
 Highest education level: Not on file   Occupational History    Not on file   Social Needs    Financial resource strain: Not on file    Food insecurity     Worry: Not on file     Inability: Not on file    Transportation needs     Medical: Not on file     Non-medical: Not on file   Tobacco Use    Smoking status: Current Every Day Smoker     Packs/day: 0.75     Years: 26.00     Pack years: 19.50     Types: Cigarettes    Smokeless tobacco: Never Used    Tobacco comment: currently 0.5 ppd, 10/10/18   Substance and Sexual Activity    Alcohol use: Yes     Alcohol/week: 1.0 standard drinks     Types: 1 Cans of beer per week     Frequency: Monthly or less     Binge frequency: Less than monthly     Comment: drinks occ beer and 2 coffee daily    Drug use: No     Types: Marijuana     Comment: past use of marijuana;  none since 1999    Sexual activity: Not on file   Lifestyle    Physical activity     Days per week: Not on file     Minutes per session: Not on file    Stress: Not on file   Relationships    Social connections     Talks on phone: Not on file     Gets together: Not on file     Attends Evangelical service: Not on file     Active member of club or organization: Not on file     Attends meetings of clubs or organizations: Not on file     Relationship status: Not on file    Intimate partner violence     Fear of current or ex partner: Not on file     Emotionally abused: Not on file     Physically abused: Not on file     Forced sexual activity: Not on file   Other Topics Concern    Not on file   Social History Narrative    Drinks 1 cup of coffee daily and occ energy drink           ROS:  [x] All negative/unchanged except if checked.  Explain positive(checked items) below:  [] Constitutional  [] Eyes  [] Ear/Nose/Mouth/Throat  [] Respiratory  [] CV  [] GI  []   [] Musculoskeletal  [] Skin/Breast  [] Neurological  [] Endocrine  [] Heme/Lymph  [] Allergic/Immunologic    Explanation: MEDICATIONS:    Current Facility-Administered Medications:     divalproex (DEPAKOTE) DR tablet 750 mg, 750 mg, Oral, 2 times per day, ISSA Carty - CNP    PARoxetine (PAXIL) tablet 10 mg, 10 mg, Oral, Daily, ISSA Paz CNP, 10 mg at 07/22/20 1354    paliperidone (INVEGA) extended release tablet 3 mg, 3 mg, Oral, BID, ISSA Paz CNP, 3 mg at 07/22/20 2623    furosemide (LASIX) tablet 40 mg, 40 mg, Oral, Daily, Niall Carter MD, 40 mg at 07/22/20 0904    spironolactone (ALDACTONE) tablet 50 mg, 50 mg, Oral, Daily, Niall Carter MD, 50 mg at 07/22/20 0904    insulin glargine (LANTUS) injection vial 30 Units, 30 Units, Subcutaneous, Nightly, Niall Carter MD, 30 Units at 07/21/20 2021    alogliptin (NESINA) tablet 25 mg, 25 mg, Oral, Daily, Lonnie Low MD, 25 mg at 07/22/20 0904    glucose (GLUTOSE) 40 % oral gel 15 g, 15 g, Oral, PRN, Niall Carter MD    dextrose 50 % IV solution, 12.5 g, Intravenous, PRN, Niall Carter MD    glucagon (rDNA) injection 1 mg, 1 mg, Intramuscular, PRN, Niall Carter MD    dextrose 5 % solution, 100 mL/hr, Intravenous, PRN, Niall Carter MD    acetaminophen (TYLENOL) tablet 650 mg, 650 mg, Oral, Q6H PRN, Cm Bernard MD    hydrOXYzine (VISTARIL) capsule 50 mg, 50 mg, Oral, TID PRN, Cm Bernard MD, 50 mg at 07/21/20 1847    haloperidol lactate (HALDOL) injection 5 mg, 5 mg, Intramuscular, Q6H PRN, 5 mg at 07/21/20 0052 **OR** haloperidol (HALDOL) tablet 5 mg, 5 mg, Oral, Q6H PRN, Cm Bernard MD    traZODone (DESYREL) tablet 50 mg, 50 mg, Oral, Nightly PRN, Cm Bernard MD, 50 mg at 07/21/20 2021    magnesium hydroxide (MILK OF MAGNESIA) 400 MG/5ML suspension 30 mL, 30 mL, Oral, Daily PRN, Cm Bernard MD    aluminum & magnesium hydroxide-simethicone (MAALOX) 200-200-20 MG/5ML suspension 30 mL, 30 mL, Oral, PRN, Cm Bernard MD    nicotine polacrilex (COMMIT) lozenge 2 mg, 2 mg, Oral, PRN, Karlo Del Toro MD, 2 mg at 07/16/20 6359    LORazepam (ATIVAN) injection 2 mg, 2 mg, Intramuscular, K2X PRN, Royce Has, APRN - CNP, 2 mg at 07/21/20 0052    ziprasidone (GEODON) injection 10 mg, 10 mg, Intramuscular, Once, Lupis Sanchez MD    LORazepam (ATIVAN) injection 1 mg, 1 mg, Intramuscular, Once, Lupis Sanchez MD      Examination:  BP (!) 108/56   Pulse 86   Temp 98 °F (36.7 °C)   Resp 16   Ht 5' 9\" (1.753 m)   Wt 253 lb (114.8 kg)   SpO2 97%   BMI 37.36 kg/m²   Gait - steady  Medication side effects(SE):  No medication side effects to be noted, patient was educated on signs and symptoms of medication side effects instructed to notify medical staff if any signs and symptoms occur. Patient has the capacity to understand this information and what I instructed her to do if medication side effects arise. Mental Status Examination:    Level of consciousness:  within normal limits   Appearance:  well-appearing  Behavior/Motor: Tired nodding off during assessment  Attitude toward examiner:  cooperative  Speech:  spontaneous, normal rate and normal volume   Mood: \" I am okay. \"  Affect: Flat and blunted  Thought processes: Linear without flight of ideas or loose associations  Thought content: Appears internally preoccupied  Cognition:  oriented to person, place, and time   Concentration intact  Memory intact  Insight poor   Judgement poor   Fund of Knowledge limited    ASSESSMENT:   Patient symptoms are:  [] Well controlled  [] Improving  [] Worsening  [x] No change      Diagnosis:  Principal Problem:    Schizoaffective disorder, bipolar type ([facility]  Resolved Problems:    * No resolved hospital problems. *      LABS:    No results for input(s): WBC, HGB, PLT in the last 72 hours. No results for input(s): NA, K, CL, CO2, BUN, CREATININE, GLUCOSE in the last 72 hours. No results for input(s): BILITOT, ALKPHOS, AST, ALT in the last 72 hours.   Lab Results   Component Value Date    [facility] NOT DETECTED 07/14/2020    LABAMPH NOT DETECTED 06/30/2012    BARBSCNU NOT DETECTED 07/14/2020    LABBENZ NOT DETECTED 07/14/2020    LABBENZ NOT DETECTED 06/30/2012    CANNAB NOT DETECTED  01/12/2014    COCAINESCRN NOT DETECTED 01/12/2014    LABMETH NOT DETECTED 07/14/2020    OPIATESCREENURINE NOT DETECTED 07/14/2020    PHENCYCLIDINESCREENURINE NOT DETECTED 07/14/2020    PPXUR NOT DETECTED 02/17/2013    ETOH <10 07/14/2020     Lab Results   Component Value Date    TSH 0.855 07/08/2019     No results found for: LITHIUM  Lab Results   Component Value Date    VALPROATE 61 07/22/2020       Treatment Plan:  Reviewed current Medications with the patient. We will discontinue oral Abilify per patient's mother patient has been more agitated since starting the Abilify  Patient is on the Aristada 882 mg IM every 30 days next dose is due on 8/8/2020  Increase Depakote 750 mg twice daily for mood stabilization. 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 Risks, benefits, side effects, drug-to-drug interactions and alternatives to treatment were discussed. Collateral information: To be obtained by  to insure patient safety on discharge  CD evaluation  Encourage patient to attend group and other milieu activities.   Discharge planning discussed with the patient and treatment team.         PSYCHOTHERAPY/COUNSELING:  [x] Therapeutic interview  [x] Supportive  [] CBT  [] Ongoing  [] Other    [x] Patient continues to need, on a daily basis, active treatment furnished directly by or requiring the supervision of inpatient psychiatric personnel      Anticipated Length of stay: 7 to 10 days based on patient stability            Electronically signed by ISSA Hernández CNP on 8/60/8308 at 2:58 PM

## 2020-07-22 NOTE — CARE COORDINATION
Sw observed this pt roaming the halls aimlessly. Pt continues to not be social with peers. Attends select groups. Sw will continue to observe this pt's behaviors and mood.

## 2020-07-23 LAB
METER GLUCOSE: 127 MG/DL (ref 74–99)
METER GLUCOSE: 189 MG/DL (ref 74–99)

## 2020-07-23 PROCEDURE — 6370000000 HC RX 637 (ALT 250 FOR IP): Performed by: NURSE PRACTITIONER

## 2020-07-23 PROCEDURE — 6370000000 HC RX 637 (ALT 250 FOR IP): Performed by: PSYCHIATRY & NEUROLOGY

## 2020-07-23 PROCEDURE — 1240000000 HC EMOTIONAL WELLNESS R&B

## 2020-07-23 PROCEDURE — 99232 SBSQ HOSP IP/OBS MODERATE 35: CPT | Performed by: NURSE PRACTITIONER

## 2020-07-23 PROCEDURE — 6370000000 HC RX 637 (ALT 250 FOR IP): Performed by: INTERNAL MEDICINE

## 2020-07-23 PROCEDURE — 82962 GLUCOSE BLOOD TEST: CPT

## 2020-07-23 RX ADMIN — SPIRONOLACTONE 50 MG: 25 TABLET ORAL at 08:55

## 2020-07-23 RX ADMIN — HYDROXYZINE PAMOATE 50 MG: 25 CAPSULE ORAL at 21:02

## 2020-07-23 RX ADMIN — PAROXETINE 10 MG: 10 TABLET, FILM COATED ORAL at 08:54

## 2020-07-23 RX ADMIN — PALIPERIDONE 3 MG: 3 TABLET, EXTENDED RELEASE ORAL at 21:04

## 2020-07-23 RX ADMIN — FUROSEMIDE 40 MG: 40 TABLET ORAL at 08:54

## 2020-07-23 RX ADMIN — DIVALPROEX SODIUM 750 MG: 250 TABLET, DELAYED RELEASE ORAL at 08:55

## 2020-07-23 RX ADMIN — DIVALPROEX SODIUM 750 MG: 250 TABLET, DELAYED RELEASE ORAL at 21:01

## 2020-07-23 RX ADMIN — ALOGLIPTIN 25 MG: 25 TABLET, FILM COATED ORAL at 08:55

## 2020-07-23 RX ADMIN — INSULIN GLARGINE 30 UNITS: 100 INJECTION, SOLUTION SUBCUTANEOUS at 21:02

## 2020-07-23 RX ADMIN — TRAZODONE HYDROCHLORIDE 50 MG: 50 TABLET ORAL at 21:02

## 2020-07-23 RX ADMIN — PALIPERIDONE 3 MG: 3 TABLET, EXTENDED RELEASE ORAL at 08:55

## 2020-07-23 ASSESSMENT — PAIN SCALES - GENERAL
PAINLEVEL_OUTOF10: 0
PAINLEVEL_OUTOF10: 0

## 2020-07-23 NOTE — PROGRESS NOTES
education: Not on file    Highest education level: Not on file   Occupational History    Not on file   Social Needs    Financial resource strain: Not on file    Food insecurity     Worry: Not on file     Inability: Not on file    Transportation needs     Medical: Not on file     Non-medical: Not on file   Tobacco Use    Smoking status: Current Every Day Smoker     Packs/day: 0.75     Years: 26.00     Pack years: 19.50     Types: Cigarettes    Smokeless tobacco: Never Used    Tobacco comment: currently 0.5 ppd, 10/10/18   Substance and Sexual Activity    Alcohol use: Yes     Alcohol/week: 1.0 standard drinks     Types: 1 Cans of beer per week     Frequency: Monthly or less     Binge frequency: Less than monthly     Comment: drinks occ beer and 2 coffee daily    Drug use: No     Types: Marijuana     Comment: past use of marijuana;  none since 1999    Sexual activity: Not on file   Lifestyle    Physical activity     Days per week: Not on file     Minutes per session: Not on file    Stress: Not on file   Relationships    Social connections     Talks on phone: Not on file     Gets together: Not on file     Attends Yazidi service: Not on file     Active member of club or organization: Not on file     Attends meetings of clubs or organizations: Not on file     Relationship status: Not on file    Intimate partner violence     Fear of current or ex partner: Not on file     Emotionally abused: Not on file     Physically abused: Not on file     Forced sexual activity: Not on file   Other Topics Concern    Not on file   Social History Narrative    Drinks 1 cup of coffee daily and occ energy drink           ROS:  [x] All negative/unchanged except if checked.  Explain positive(checked items) below:  [] Constitutional  [] Eyes  [] Ear/Nose/Mouth/Throat  [] Respiratory  [] CV  [] GI  []   [] Musculoskeletal  [] Skin/Breast  [] Neurological  [] Endocrine  [] Heme/Lymph  [] Allergic/Immunologic    Explanation: MEDICATIONS:    Current Facility-Administered Medications:     divalproex (DEPAKOTE) DR tablet 750 mg, 750 mg, Oral, 2 times per day, ISSA Tran CNP, 225 mg at 07/23/20 0855    PARoxetine (PAXIL) tablet 10 mg, 10 mg, Oral, Daily, ISSA Ho CNP, 10 mg at 07/23/20 0854    paliperidone (INVEGA) extended release tablet 3 mg, 3 mg, Oral, BID, ISSA Ho CNP, 3 mg at 07/23/20 0855    furosemide (LASIX) tablet 40 mg, 40 mg, Oral, Daily, Farrah Vasquez MD, 40 mg at 07/23/20 0854    spironolactone (ALDACTONE) tablet 50 mg, 50 mg, Oral, Daily, Farrah Vasquez MD, 50 mg at 07/23/20 0855    insulin glargine (LANTUS) injection vial 30 Units, 30 Units, Subcutaneous, Nightly, Farrah Vasquez MD, 30 Units at 07/22/20 2021    alogliptin (NESINA) tablet 25 mg, 25 mg, Oral, Daily, Samuel Low MD, 25 mg at 07/23/20 0855    glucose (GLUTOSE) 40 % oral gel 15 g, 15 g, Oral, PRN, Farrah Vasquez MD    dextrose 50 % IV solution, 12.5 g, Intravenous, PRN, Farrah Vasquez MD    glucagon (rDNA) injection 1 mg, 1 mg, Intramuscular, PRN, Farrah Vasquez MD    dextrose 5 % solution, 100 mL/hr, Intravenous, PRN, Farrah Vasquez MD    acetaminophen (TYLENOL) tablet 650 mg, 650 mg, Oral, Q6H PRN, Kaylee Navarro MD    hydrOXYzine (VISTARIL) capsule 50 mg, 50 mg, Oral, TID PRN, Kaylee Navarro MD, 50 mg at 07/22/20 2021    haloperidol lactate (HALDOL) injection 5 mg, 5 mg, Intramuscular, Q6H PRN, 5 mg at 07/21/20 0052 **OR** haloperidol (HALDOL) tablet 5 mg, 5 mg, Oral, Q6H PRN, Kaylee Navarro MD, 5 mg at 07/22/20 2354    traZODone (DESYREL) tablet 50 mg, 50 mg, Oral, Nightly PRN, Kaylee Navarro MD, 50 mg at 07/22/20 2020    magnesium hydroxide (MILK OF MAGNESIA) 400 MG/5ML suspension 30 mL, 30 mL, Oral, Daily PRN, Kaylee Navarro MD    aluminum & magnesium hydroxide-simethicone (MAALOX) 200-200-20 MG/5ML suspension 30 mL, 30 mL, Oral, PRN, Kaylee Navarro MD    nicotine polacrilex (COMMIT) lozenge 2 mg, 2 mg, Oral, PRN, Linwood De La Torre MD, 2 mg at 07/16/20 0942    LORazepam (ATIVAN) injection 2 mg, 2 mg, Intramuscular, E3K PRN, Kevin Daley APRN - CNP, 2 mg at 07/21/20 0052    ziprasidone (GEODON) injection 10 mg, 10 mg, Intramuscular, Once, Trenton Roldan MD    LORazepam (ATIVAN) injection 1 mg, 1 mg, Intramuscular, Once, Trenton Roldan MD      Examination:  /71   Pulse 84   Temp 97 °F (36.1 °C) (Oral)   Resp 14   Ht 5' 9\" (1.753 m)   Wt 253 lb (114.8 kg)   SpO2 97%   BMI 37.36 kg/m²   Gait - steady  Medication side effects(SE):  No medication side effects to be noted, patient was educated on signs and symptoms of medication side effects instructed to notify medical staff if any signs and symptoms occur. Patient has the capacity to understand this information and what I instructed her to do if medication side effects arise. Mental Status Examination:    Level of consciousness:  within normal limits   Appearance:  well-appearing  Behavior/Motor: Tired nodding off during assessment  Attitude toward examiner:  cooperative  Speech:  spontaneous, normal rate and normal volume   Mood: \" I am okay. \"  Affect: Flat and blunted  Thought processes: Linear without flight of ideas or loose associations  Thought content: Appears internally preoccupied  Cognition:  oriented to person, place, and time   Concentration intact  Memory intact  Insight poor   Judgement poor   Fund of Knowledge limited    ASSESSMENT:   Patient symptoms are:  [] Well controlled  [] Improving  [] Worsening  [x] No change      Diagnosis:  Principal Problem:    Schizoaffective disorder, [facility] ([facility]  Resolved Problems:    * No resolved hospital problems. *      LABS:    No results for input(s): WBC, HGB, PLT in the last 72 hours. No results for input(s): NA, K, CL, CO2, BUN, CREATININE, GLUCOSE in the last 72 hours.   No results for input(s): BILITOT, ALKPHOS, AST, ALT in the last 72 hours. Lab Results   Component Value Date    LABAMPH NOT DETECTED 07/14/2020    [address] NOT DETECTED 06/30/2012    BARBSCNU NOT DETECTED 07/14/2020    LABBENZ NOT DETECTED 07/14/2020    LABBENZ NOT DETECTED 06/30/2012    CANNAB NOT DETECTED  01/12/2014    COCAINESCRN NOT DETECTED 01/12/2014    LABMETH NOT DETECTED 07/14/2020    OPIATESCREENURINE NOT DETECTED 07/14/2020    PHENCYCLIDINESCREENURINE NOT DETECTED 07/14/2020    PPXUR NOT DETECTED 02/17/2013    ETOH <10 07/14/2020     Lab Results   Component Value Date    TSH 0.855 07/08/2019     No results found for: LITHIUM  Lab Results   Component Value Date    VALPROATE 61 07/22/2020       Treatment Plan:  Reviewed current Medications with the patient. We will discontinue oral Abilify per patient's mother patient has been more agitated since starting the Abilify  Patient is on the Aristada 882 mg IM every 30 days next dose is due on 8/8/2020  Increase Depakote 750 mg twice daily for mood stabilization. 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 Risks, benefits, side effects, drug-to-drug interactions and alternatives to treatment were discussed. Collateral information: To be obtained by  to insure patient safety on discharge  CD evaluation  Encourage patient to attend group and other milieu activities.   Discharge planning discussed with the patient and treatment team.         PSYCHOTHERAPY/COUNSELING:  [x] Therapeutic interview  [x] Supportive  [] CBT  [] Ongoing  [] Other    [x] Patient continues to need, on a daily basis, active treatment furnished directly by or requiring the supervision of inpatient psychiatric personnel      Anticipated Length of stay: 7 to 10 days based on patient stability            Electronically signed by ISSA Edwards CNP on 7/23/2020 at 3:33 PM

## 2020-07-23 NOTE — PLAN OF CARE
Denies SI/HI   denies hallucinations however appears preoccupied  out on the unit but stays to self    cooperative with meds   will continue to monitor

## 2020-07-23 NOTE — PROGRESS NOTES
In doorway taking off shirt slamming the bathroom door did not respond to staff verbally preoccupied tenuous

## 2020-07-23 NOTE — CARE COORDINATION
Pt at the nurse's station asking when he is able to go home. Sw discussed with NP Ashlyn Lopez, and it appears he will possibly  be discharged tomorrow. Pt ok and agreeable with this. Sw will assist as needed.

## 2020-07-23 NOTE — PROGRESS NOTES
Pt witnessed taking his shirt off very aggressively per other RN. This nurse approached pt in his room. Pt denies being upset or angry. Pt soft spoken, sitting and eating his ice cream in his room. Pt advised to seek staff if he needs anything.  Will continue to monitor

## 2020-07-24 VITALS
OXYGEN SATURATION: 97 % | TEMPERATURE: 98 F | DIASTOLIC BLOOD PRESSURE: 60 MMHG | HEART RATE: 113 BPM | HEIGHT: 69 IN | WEIGHT: 253 LBS | RESPIRATION RATE: 16 BRPM | BODY MASS INDEX: 37.47 KG/M2 | SYSTOLIC BLOOD PRESSURE: 125 MMHG

## 2020-07-24 LAB
METER GLUCOSE: 121 MG/DL (ref 74–99)
METER GLUCOSE: 127 MG/DL (ref 74–99)

## 2020-07-24 PROCEDURE — 99239 HOSP IP/OBS DSCHRG MGMT >30: CPT | Performed by: NURSE PRACTITIONER

## 2020-07-24 PROCEDURE — 82962 GLUCOSE BLOOD TEST: CPT

## 2020-07-24 PROCEDURE — 6370000000 HC RX 637 (ALT 250 FOR IP): Performed by: NURSE PRACTITIONER

## 2020-07-24 PROCEDURE — 6370000000 HC RX 637 (ALT 250 FOR IP): Performed by: INTERNAL MEDICINE

## 2020-07-24 RX ORDER — PAROXETINE 10 MG/1
10 TABLET, FILM COATED ORAL DAILY
Qty: 30 TABLET | Refills: 0 | Status: SHIPPED | OUTPATIENT
Start: 2020-07-25 | End: 2022-03-14

## 2020-07-24 RX ORDER — DIVALPROEX SODIUM 250 MG/1
750 TABLET, DELAYED RELEASE ORAL EVERY 12 HOURS SCHEDULED
Qty: 180 TABLET | Refills: 0 | Status: ON HOLD | OUTPATIENT
Start: 2020-07-24 | End: 2021-01-28 | Stop reason: ALTCHOICE

## 2020-07-24 RX ORDER — POLYETHYLENE GLYCOL 3350 17 G
2 POWDER IN PACKET (EA) ORAL PRN
Qty: 100 EACH | Refills: 3
Start: 2020-07-24 | End: 2021-06-11

## 2020-07-24 RX ORDER — PALIPERIDONE 3 MG/1
3 TABLET, EXTENDED RELEASE ORAL 2 TIMES DAILY
Qty: 60 TABLET | Refills: 0 | Status: ON HOLD | OUTPATIENT
Start: 2020-07-24 | End: 2021-02-05 | Stop reason: HOSPADM

## 2020-07-24 RX ADMIN — SPIRONOLACTONE 50 MG: 25 TABLET ORAL at 08:28

## 2020-07-24 RX ADMIN — ALOGLIPTIN 25 MG: 25 TABLET, FILM COATED ORAL at 08:29

## 2020-07-24 RX ADMIN — DIVALPROEX SODIUM 750 MG: 250 TABLET, DELAYED RELEASE ORAL at 08:28

## 2020-07-24 RX ADMIN — PAROXETINE 10 MG: 10 TABLET, FILM COATED ORAL at 08:28

## 2020-07-24 RX ADMIN — PALIPERIDONE 3 MG: 3 TABLET, EXTENDED RELEASE ORAL at 08:31

## 2020-07-24 RX ADMIN — FUROSEMIDE 40 MG: 40 TABLET ORAL at 08:29

## 2020-07-24 ASSESSMENT — PAIN SCALES - GENERAL
PAINLEVEL_OUTOF10: 0
PAINLEVEL_OUTOF10: 0

## 2020-07-24 NOTE — PLAN OF CARE
Problem: Altered Mood, Psychotic Behavior:  Goal: Able to verbalize decrease in frequency and intensity of hallucinations  Description: Able to verbalize decrease in frequency and intensity of hallucinations  7/23/2020 2041 by Sunitha Simms RN  Outcome: Ongoing     Problem: Altered Mood, Psychotic Behavior:  Goal: Able to verbalize reality based thinking  Description: Able to verbalize reality based thinking  7/23/2020 2041 by Sunitha Simms RN  Outcome: Ongoing     Patient is withdrawn to his room, looking out the window. Calm and cooperative during conversation. Reports his day has been \"slow\". Denies suicidal and homicidal ideations. + auditory hallucinations- hearing \"whispers\". Non command. During conversation, patient would smile and laugh inappropriately. Behavior is in control. Purposeful rounding continued.

## 2020-07-24 NOTE — CARE COORDINATION
In order to ensure appropriate transition and discharge planning is in place, the following documents have been transmitted to [facility], as the new outpatient provider:     · The d/c diagnosis was transmitted to the next care provider  · The reason for hospitalization was transmitted to the next care provider  · The d/c medications (dosage and indication) were transmitted to the next care provider   · The continuing care plan was transmitted to the next care provider

## 2020-07-24 NOTE — PROGRESS NOTES
Group Therapy Note     Date: 7/24/2020     [place] [facility] [place]  Group End Time: 1200  Group Topic: Cognitive Skills     [facility] 7SE ACUTE BH 1    YESICA Watson, [facility]           Group Therapy Note     Attendees: 17        Patient's Goal:  Identify self-care tips and how to take care of self once discharged     Notes:  Patent was an active listener.     Status After Intervention:  Improved     Participation Level:  Active Listener and Interactive     Participation Quality: Appropriate, Attentive and Supportive        Speech:  normal        Thought Process/Content: Logical        Affective Functioning: Congruent        Mood: euthymic        Level of consciousness:  Alert        Response to Learning: Progressing to goal        Endings: None Reported     Modes of Intervention: Problem-solving        Discipline Responsible:         Signature:  YESICA [place], [place]

## 2020-07-24 NOTE — GROUP NOTE
Group Therapy Note    Date: 7/24/2020    Group Start Time: 1000  Group End Time: 56  Group Topic: Psychoeducation    [facility] 7[facility] ACUTE BH 2    Shilpa Melendez, CTRS        Group Therapy Note      Number of participants: 12  Type of group: Psychoeducation  Mode of intervention: Education, Support, Socialization, Exploration, Clarifying, and Problem-solving  Topic: Gratitude  Objective: PT will identify ways to maintain an attitude of gratitude in recovery. Patient's Goal:  \"Get released\"     Notes:  Pt was interactive during group sharing ways to maintain an attitude of gratitude in recovery. Pt shared what he is grateful for and gave support to others. Status After Intervention:  Improved    Participation Level:  Active Listener and Interactive    Participation Quality: Appropriate, Attentive, Sharing and Supportive      Speech:  normal      Thought Process/Content: Logical      Affective Functioning: Congruent      Mood: euthymic      Level of consciousness:  Alert, Oriented x4 and Attentive      Response to Learning: Able to verbalize current knowledge/experience, Able to verbalize/acknowledge new learning, Able to retain information, Capable of insight, Able to change behavior and Progressing to goal      Endings: None Reported    Modes of Intervention: Education, Support, Socialization, Exploration, Clarifying, Problem-solving and Activity

## 2020-07-24 NOTE — PROGRESS NOTES
[facility]  Discharge Note    Pt discharged with followings belongings:   Dentures: None  Vision - Corrective Lenses: Glasses, Other (Comment)(not with the patient.)  Hearing Aid: None  Jewelry: None  Body Piercings Removed: N/A(no visible body piercing.)  Clothing: Footwear, Pants, Shirt, Socks, Other (Comment)(1 short pants, 1 shirt, 1 belt, 1 pair of socks)  Were All Patient Medications Collected?: Not Applicable(no medication with the patient.)  Other Valuables: Wallet(1 wallet with personal ID`s, membership cards. (NO BILLS, NO BANKCARD))   Valuables sent home with patient. Patient education on aftercare instructions: yes Patient verbalize understanding of AVS:  yes. Status EXAM upon discharge:  Status and Exam  Normal: Yes  Facial Expression: Flat  Affect: Blunt  Level of Consciousness: Alert  Mood:Normal: No  Mood: Depressed, Anxious, Suspicious  Motor Activity:Normal: Yes  Motor Activity: Decreased  Interview Behavior: Cooperative, Evasive  Preception: [place] to Person, [place] Sauger to Time, [place] to Place, [place] to Situation  Attention:Normal: Yes  Attention: Distractible  Thought Processes: Circumstantial(impaired)  Thought Content:Normal: Yes  Thought Content: Preoccupations  Hallucinations: None  Delusions: No  Delusions:  Other(See Comment)  Memory:Normal: Yes  Memory: Poor Recent, Poor Remote  Insight and Judgment: No  Insight and Judgment: Poor Insight  Present Suicidal Ideation: No  Present Homicidal Ideation: No      Metabolic Screening:    Lab Results   Component Value Date    LABA1C 7.7 (H) 07/16/2020       Lab Results   Component Value Date    CHOL 135 01/07/2020    CHOL 113 05/30/2019    CHOL 127 09/26/2018    CHOL 138 04/16/2018    CHOL 225 (H) 08/22/2017    CHOL 199 08/12/2013    CHOL 174 07/01/2012     Lab Results   Component Value Date    TRIG 113 01/07/2020    TRIG 97 05/30/2019    TRIG 150 (H) 09/26/2018    TRIG 100 04/16/2018    TRIG 211 (H) 08/22/2017    TRIG 200 (H) 08/12/2013    TRIG 134 07/01/2012     Lab Results   Component Value Date    HDL 46 01/07/2020    HDL 32 05/30/2019    HDL 31 09/26/2018    HDL 42 04/16/2018    HDL 42 08/22/2017    HDL 42.0 08/12/2013    HDL 29.0 (A) 07/01/2012     No components found for: [facility]  Lab Results   Component Value Date    LABVLDL 23 01/07/2020    LABVLDL 19 05/30/2019    LABVLDL 30 09/26/2018    LABVLDL 42 08/22/2017       Nayana Viramontes RN

## 2020-07-24 NOTE — PLAN OF CARE
Problem: Altered Mood, Psychotic Behavior:  Goal: Able to verbalize decrease in frequency and intensity of hallucinations  Description: Able to verbalize decrease in frequency and intensity of hallucinations  7/24/2020 0926 by Khadar Jacobson RN  Outcome: Completed  7/23/2020 2041 by Luly Carbajal RN  Outcome: Ongoing     Problem: Altered Mood, Psychotic Behavior:  Goal: Able to verbalize reality based thinking  Description: Able to verbalize reality based thinking  7/24/2020 0926 by Khadar Jacobson RN  Outcome: Completed  7/23/2020 2041 by Luly Carbajal RN  Outcome: Ongoing
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
## 2020-08-10 ENCOUNTER — OFFICE VISIT (OUTPATIENT)
Dept: CARDIOLOGY CLINIC | Age: 40
End: 2020-08-10
Payer: MEDICARE

## 2020-08-10 VITALS
SYSTOLIC BLOOD PRESSURE: 118 MMHG | WEIGHT: 271.8 LBS | HEART RATE: 90 BPM | OXYGEN SATURATION: 94 % | DIASTOLIC BLOOD PRESSURE: 60 MMHG | HEIGHT: 69 IN | RESPIRATION RATE: 20 BRPM | BODY MASS INDEX: 40.26 KG/M2

## 2020-08-10 PROCEDURE — 1111F DSCHRG MED/CURRENT MED MERGE: CPT | Performed by: INTERNAL MEDICINE

## 2020-08-10 PROCEDURE — 4004F PT TOBACCO SCREEN RCVD TLK: CPT | Performed by: INTERNAL MEDICINE

## 2020-08-10 PROCEDURE — G8427 DOCREV CUR MEDS BY ELIG CLIN: HCPCS | Performed by: INTERNAL MEDICINE

## 2020-08-10 PROCEDURE — G8417 CALC BMI ABV UP PARAM F/U: HCPCS | Performed by: INTERNAL MEDICINE

## 2020-08-10 PROCEDURE — 99214 OFFICE O/P EST MOD 30 MIN: CPT | Performed by: INTERNAL MEDICINE

## 2020-08-10 PROCEDURE — 93000 ELECTROCARDIOGRAM COMPLETE: CPT | Performed by: INTERNAL MEDICINE

## 2020-08-10 RX ORDER — CARVEDILOL 25 MG/1
37.5 TABLET ORAL 2 TIMES DAILY
COMMUNITY
End: 2020-08-10 | Stop reason: SDUPTHER

## 2020-08-10 RX ORDER — SPIRONOLACTONE 25 MG/1
25 TABLET ORAL DAILY
Qty: 30 TABLET | Refills: 11 | Status: SHIPPED
Start: 2020-08-10 | End: 2021-05-17

## 2020-08-10 RX ORDER — FUROSEMIDE 40 MG/1
40 TABLET ORAL DAILY
Qty: 30 TABLET | Refills: 11 | Status: SHIPPED
Start: 2020-08-10 | End: 2021-05-17

## 2020-08-10 RX ORDER — CARVEDILOL 25 MG/1
25 TABLET ORAL 2 TIMES DAILY
Qty: 60 TABLET | Refills: 11 | Status: SHIPPED
Start: 2020-08-10 | End: 2021-05-17

## 2020-08-10 RX ORDER — SPIRONOLACTONE 25 MG/1
25 TABLET ORAL DAILY
COMMUNITY
End: 2020-08-10 | Stop reason: SDUPTHER

## 2020-08-10 RX ORDER — FUROSEMIDE 40 MG/1
40 TABLET ORAL DAILY
COMMUNITY
End: 2020-08-10 | Stop reason: SDUPTHER

## 2020-08-10 RX ORDER — LISINOPRIL 10 MG/1
10 TABLET ORAL DAILY
Qty: 30 TABLET | Refills: 11 | Status: SHIPPED
Start: 2020-08-10 | End: 2021-05-17

## 2020-08-10 SDOH — HEALTH STABILITY: MENTAL HEALTH: HOW OFTEN DO YOU HAVE A DRINK CONTAINING ALCOHOL?: 2-4 TIMES A MONTH

## 2020-08-10 SDOH — HEALTH STABILITY: MENTAL HEALTH: HOW MANY STANDARD DRINKS CONTAINING ALCOHOL DO YOU HAVE ON A TYPICAL DAY?: 1 OR 2
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
## 2020-09-21 ENCOUNTER — HOSPITAL ENCOUNTER (OUTPATIENT)
Age: 40
Discharge: HOME | End: 2020-09-21
Payer: MEDICARE

## 2020-09-21 LAB
ALBUMIN SERPL-MCNC: 4.3 G/DL (ref 3.5–5.2)
ALP BLD-CCNC: 78 U/L (ref 40–129)
ALT SERPL-CCNC: 17 U/L (ref 0–40)
AMMONIA: 16 UMOL/L (ref 16–60)
ANION GAP SERPL CALCULATED.3IONS-SCNC: 9 MMOL/L (ref 7–16)
AST SERPL-CCNC: 18 U/L (ref 0–39)
BASOPHILS ABSOLUTE: 0.03 E9/L (ref 0–0.2)
BASOPHILS RELATIVE PERCENT: 0.5 % (ref 0–2)
BILIRUB SERPL-MCNC: 0.4 MG/DL (ref 0–1.2)
BUN BLDV-MCNC: 16 MG/DL (ref 6–20)
CALCIUM SERPL-MCNC: 9.7 MG/DL (ref 8.6–10.2)
CHLORIDE BLD-SCNC: 98 MMOL/L (ref 98–107)
CO2: 31 MMOL/L (ref 22–29)
CREAT SERPL-MCNC: 1.3 MG/DL (ref 0.7–1.2)
EOSINOPHILS ABSOLUTE: 0.05 E9/L (ref 0.05–0.5)
EOSINOPHILS RELATIVE PERCENT: 0.8 % (ref 0–6)
GFR AFRICAN AMERICAN: >60
GFR NON-AFRICAN AMERICAN: >60 ML/MIN/1.73
GLUCOSE BLD-MCNC: 138 MG/DL (ref 74–99)
HCT VFR BLD CALC: 48.6 % (ref 37–54)
HEMOGLOBIN: 15.1 G/DL (ref 12.5–16.5)
IMMATURE GRANULOCYTES #: 0.04 E9/L
IMMATURE GRANULOCYTES %: 0.6 % (ref 0–5)
LYMPHOCYTES ABSOLUTE: 2 E9/L (ref 1.5–4)
LYMPHOCYTES RELATIVE PERCENT: 30.2 % (ref 20–42)
MCH RBC QN AUTO: 27.9 PG (ref 26–35)
MCHC RBC AUTO-ENTMCNC: 31.1 % (ref 32–34.5)
MCV RBC AUTO: 89.8 FL (ref 80–99.9)
MONOCYTES ABSOLUTE: 0.56 E9/L (ref 0.1–0.95)
MONOCYTES RELATIVE PERCENT: 8.5 % (ref 2–12)
NEUTROPHILS ABSOLUTE: 3.94 E9/L (ref 1.8–7.3)
NEUTROPHILS RELATIVE PERCENT: 59.4 % (ref 43–80)
PDW BLD-RTO: 14.5 FL (ref 11.5–15)
PLATELET # BLD: 163 E9/L (ref 130–450)
PMV BLD AUTO: 10.2 FL (ref 7–12)
POTASSIUM SERPL-SCNC: 5 MMOL/L (ref 3.5–5)
RBC # BLD: 5.41 E12/L (ref 3.8–5.8)
SODIUM BLD-SCNC: 138 MMOL/L (ref 132–146)
TOTAL PROTEIN: 6.8 G/DL (ref 6.4–8.3)
TSH SERPL DL<=0.05 MIU/L-ACNC: 0.49 UIU/ML (ref 0.27–4.2)
VALPROIC ACID LEVEL: 27 MCG/ML (ref 50–100)
WBC # BLD: 6.6 E9/L (ref 4.5–11.5)

## 2020-09-21 PROCEDURE — 84443 ASSAY THYROID STIM HORMONE: CPT

## 2020-09-21 PROCEDURE — 80164 ASSAY DIPROPYLACETIC ACD TOT: CPT

## 2020-09-21 PROCEDURE — 36415 COLL VENOUS BLD VENIPUNCTURE: CPT

## 2020-09-21 PROCEDURE — 85025 COMPLETE CBC W/AUTO DIFF WBC: CPT

## 2020-09-21 PROCEDURE — 82140 ASSAY OF AMMONIA: CPT

## 2020-09-21 PROCEDURE — 80053 COMPREHEN METABOLIC PANEL: CPT

## 2021-01-26 ENCOUNTER — HOSPITAL ENCOUNTER (INPATIENT)
Age: 41
LOS: 9 days | Discharge: OTHER INSTITUTION | DRG: 885 | End: 2021-02-05
Attending: EMERGENCY MEDICINE | Admitting: PSYCHIATRY & NEUROLOGY
Payer: MEDICARE

## 2021-01-26 DIAGNOSIS — F39 MOOD DISORDER (HCC): Primary | ICD-10-CM

## 2021-01-26 DIAGNOSIS — S51.812A LACERATION OF LEFT FOREARM, INITIAL ENCOUNTER: ICD-10-CM

## 2021-01-26 LAB
BASOPHILS ABSOLUTE: 0.02 E9/L (ref 0–0.2)
BASOPHILS RELATIVE PERCENT: 0.3 % (ref 0–2)
EOSINOPHILS ABSOLUTE: 0.09 E9/L (ref 0.05–0.5)
EOSINOPHILS RELATIVE PERCENT: 1.3 % (ref 0–6)
HCT VFR BLD CALC: 41.7 % (ref 37–54)
HEMOGLOBIN: 13.3 G/DL (ref 12.5–16.5)
IMMATURE GRANULOCYTES #: 0.04 E9/L
IMMATURE GRANULOCYTES %: 0.6 % (ref 0–5)
LYMPHOCYTES ABSOLUTE: 2.64 E9/L (ref 1.5–4)
LYMPHOCYTES RELATIVE PERCENT: 38.7 % (ref 20–42)
MCH RBC QN AUTO: 27.9 PG (ref 26–35)
MCHC RBC AUTO-ENTMCNC: 31.9 % (ref 32–34.5)
MCV RBC AUTO: 87.6 FL (ref 80–99.9)
MONOCYTES ABSOLUTE: 0.68 E9/L (ref 0.1–0.95)
MONOCYTES RELATIVE PERCENT: 10 % (ref 2–12)
NEUTROPHILS ABSOLUTE: 3.35 E9/L (ref 1.8–7.3)
NEUTROPHILS RELATIVE PERCENT: 49.1 % (ref 43–80)
PDW BLD-RTO: 13.7 FL (ref 11.5–15)
PLATELET # BLD: 159 E9/L (ref 130–450)
PMV BLD AUTO: 9.4 FL (ref 7–12)
RBC # BLD: 4.76 E12/L (ref 3.8–5.8)
WBC # BLD: 6.8 E9/L (ref 4.5–11.5)

## 2021-01-26 PROCEDURE — U0002 COVID-19 LAB TEST NON-CDC: HCPCS

## 2021-01-26 PROCEDURE — 80164 ASSAY DIPROPYLACETIC ACD TOT: CPT

## 2021-01-26 PROCEDURE — 99284 EMERGENCY DEPT VISIT MOD MDM: CPT

## 2021-01-26 PROCEDURE — 80143 DRUG ASSAY ACETAMINOPHEN: CPT

## 2021-01-26 PROCEDURE — 80307 DRUG TEST PRSMV CHEM ANLYZR: CPT

## 2021-01-26 PROCEDURE — 80053 COMPREHEN METABOLIC PANEL: CPT

## 2021-01-26 PROCEDURE — G0480 DRUG TEST DEF 1-7 CLASSES: HCPCS

## 2021-01-26 PROCEDURE — 80179 DRUG ASSAY SALICYLATE: CPT

## 2021-01-26 PROCEDURE — 85025 COMPLETE CBC W/AUTO DIFF WBC: CPT

## 2021-01-26 PROCEDURE — 12002 RPR S/N/AX/GEN/TRNK2.6-7.5CM: CPT

## 2021-01-26 NOTE — Clinical Note
Patient Class: Inpatient [101]   REQUIRED: Diagnosis: Schizoaffective disorder, bipolar type ([facility] [[id number]]   Estimated Length of Stay: Estimated stay of more than 2 midnights   Telemetry Bed Required?: No

## 2021-01-27 LAB
ACETAMINOPHEN LEVEL: <5 MCG/ML (ref 10–30)
ALBUMIN SERPL-MCNC: 4 G/DL (ref 3.5–5.2)
ALP BLD-CCNC: 73 U/L (ref 40–129)
ALT SERPL-CCNC: 13 U/L (ref 0–40)
AMPHETAMINE SCREEN, URINE: NOT DETECTED
ANION GAP SERPL CALCULATED.3IONS-SCNC: 11 MMOL/L (ref 7–16)
AST SERPL-CCNC: 15 U/L (ref 0–39)
BARBITURATE SCREEN URINE: NOT DETECTED
BENZODIAZEPINE SCREEN, URINE: NOT DETECTED
BILIRUB SERPL-MCNC: 0.2 MG/DL (ref 0–1.2)
BUN BLDV-MCNC: 13 MG/DL (ref 6–20)
CALCIUM SERPL-MCNC: 9.4 MG/DL (ref 8.6–10.2)
CANNABINOID SCREEN URINE: NOT DETECTED
CHLORIDE BLD-SCNC: 97 MMOL/L (ref 98–107)
CO2: 27 MMOL/L (ref 22–29)
COCAINE METABOLITE SCREEN URINE: NOT DETECTED
CREAT SERPL-MCNC: 1 MG/DL (ref 0.7–1.2)
ETHANOL: <10 MG/DL (ref 0–0.08)
FENTANYL SCREEN, URINE: NOT DETECTED
GFR AFRICAN AMERICAN: >60
GFR NON-AFRICAN AMERICAN: >60 ML/MIN/1.73
GLUCOSE BLD-MCNC: 231 MG/DL (ref 74–99)
Lab: NORMAL
METER GLUCOSE: 131 MG/DL (ref 74–99)
METER GLUCOSE: 153 MG/DL (ref 74–99)
METHADONE SCREEN, URINE: NOT DETECTED
OPIATE SCREEN URINE: NOT DETECTED
OXYCODONE URINE: NOT DETECTED
PHENCYCLIDINE SCREEN URINE: NOT DETECTED
POTASSIUM SERPL-SCNC: 4.3 MMOL/L (ref 3.5–5)
SALICYLATE, SERUM: <0.3 MG/DL (ref 0–30)
SARS-COV-2, NAAT: NOT DETECTED
SODIUM BLD-SCNC: 135 MMOL/L (ref 132–146)
TOTAL PROTEIN: 6.5 G/DL (ref 6.4–8.3)
TRICYCLIC ANTIDEPRESSANTS SCREEN SERUM: NEGATIVE NG/ML
VALPROIC ACID LEVEL: 27 MCG/ML (ref 50–100)

## 2021-01-27 PROCEDURE — 6370000000 HC RX 637 (ALT 250 FOR IP): Performed by: INTERNAL MEDICINE

## 2021-01-27 PROCEDURE — 93005 ELECTROCARDIOGRAM TRACING: CPT | Performed by: EMERGENCY MEDICINE

## 2021-01-27 PROCEDURE — 1240000000 HC EMOTIONAL WELLNESS R&B

## 2021-01-27 PROCEDURE — 82962 GLUCOSE BLOOD TEST: CPT

## 2021-01-27 PROCEDURE — 0HQEXZZ REPAIR LEFT LOWER ARM SKIN, EXTERNAL APPROACH: ICD-10-PCS | Performed by: EMERGENCY MEDICINE

## 2021-01-27 RX ORDER — DEXTROSE MONOHYDRATE 50 MG/ML
100 INJECTION, SOLUTION INTRAVENOUS PRN
Status: DISCONTINUED | OUTPATIENT
Start: 2021-01-27 | End: 2021-02-05 | Stop reason: HOSPADM

## 2021-01-27 RX ORDER — NICOTINE POLACRILEX 4 MG
15 LOZENGE BUCCAL PRN
Status: DISCONTINUED | OUTPATIENT
Start: 2021-01-27 | End: 2021-02-05 | Stop reason: HOSPADM

## 2021-01-27 RX ORDER — LIDOCAINE HYDROCHLORIDE 10 MG/ML
5 INJECTION, SOLUTION INFILTRATION; PERINEURAL ONCE
Status: DISCONTINUED | OUTPATIENT
Start: 2021-01-27 | End: 2021-02-05 | Stop reason: HOSPADM

## 2021-01-27 RX ORDER — FUROSEMIDE 40 MG/1
40 TABLET ORAL DAILY
Status: DISCONTINUED | OUTPATIENT
Start: 2021-01-27 | End: 2021-02-05 | Stop reason: HOSPADM

## 2021-01-27 RX ORDER — DEXTROSE MONOHYDRATE 25 G/50ML
12.5 INJECTION, SOLUTION INTRAVENOUS PRN
Status: DISCONTINUED | OUTPATIENT
Start: 2021-01-27 | End: 2021-02-05 | Stop reason: HOSPADM

## 2021-01-27 RX ORDER — MAGNESIUM HYDROXIDE/ALUMINUM HYDROXICE/SIMETHICONE 120; 1200; 1200 MG/30ML; MG/30ML; MG/30ML
30 SUSPENSION ORAL EVERY 6 HOURS PRN
Status: DISCONTINUED | OUTPATIENT
Start: 2021-01-27 | End: 2021-02-05 | Stop reason: HOSPADM

## 2021-01-27 RX ORDER — POLYETHYLENE GLYCOL 3350 17 G
2 POWDER IN PACKET (EA) ORAL PRN
Status: DISCONTINUED | OUTPATIENT
Start: 2021-01-27 | End: 2021-02-05 | Stop reason: HOSPADM

## 2021-01-27 RX ORDER — GLIMEPIRIDE 4 MG/1
4 TABLET ORAL
Status: DISCONTINUED | OUTPATIENT
Start: 2021-01-28 | End: 2021-02-05 | Stop reason: HOSPADM

## 2021-01-27 RX ORDER — ACETAMINOPHEN 325 MG/1
650 TABLET ORAL EVERY 4 HOURS PRN
Status: DISCONTINUED | OUTPATIENT
Start: 2021-01-27 | End: 2021-02-05 | Stop reason: HOSPADM

## 2021-01-27 RX ORDER — TRAZODONE HYDROCHLORIDE 50 MG/1
50 TABLET ORAL NIGHTLY PRN
Status: DISCONTINUED | OUTPATIENT
Start: 2021-01-27 | End: 2021-02-05 | Stop reason: HOSPADM

## 2021-01-27 RX ORDER — HALOPERIDOL 5 MG/ML
5 INJECTION INTRAMUSCULAR EVERY 4 HOURS PRN
Status: DISCONTINUED | OUTPATIENT
Start: 2021-01-27 | End: 2021-02-05 | Stop reason: HOSPADM

## 2021-01-27 RX ORDER — HALOPERIDOL 5 MG
5 TABLET ORAL EVERY 4 HOURS PRN
Status: DISCONTINUED | OUTPATIENT
Start: 2021-01-27 | End: 2021-02-05 | Stop reason: HOSPADM

## 2021-01-27 RX ADMIN — FUROSEMIDE 40 MG: 40 TABLET ORAL at 22:11

## 2021-01-27 ASSESSMENT — PAIN DESCRIPTION - FREQUENCY: FREQUENCY: CONTINUOUS

## 2021-01-27 ASSESSMENT — SLEEP AND FATIGUE QUESTIONNAIRES
DO YOU HAVE DIFFICULTY SLEEPING: NO
AVERAGE NUMBER OF SLEEP HOURS: 5
DO YOU USE A SLEEP AID: NO

## 2021-01-27 ASSESSMENT — PAIN SCALES - GENERAL: PAINLEVEL_OUTOF10: 5

## 2021-01-27 ASSESSMENT — PAIN DESCRIPTION - PAIN TYPE: TYPE: ACUTE PAIN

## 2021-01-27 ASSESSMENT — PAIN DESCRIPTION - DESCRIPTORS: DESCRIPTORS: BURNING
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
## 2021-01-28 LAB
METER GLUCOSE: 127 MG/DL (ref 74–99)
METER GLUCOSE: 171 MG/DL (ref 74–99)
METER GLUCOSE: 252 MG/DL (ref 74–99)
METER GLUCOSE: 78 MG/DL (ref 74–99)

## 2021-01-28 PROCEDURE — 99223 1ST HOSP IP/OBS HIGH 75: CPT | Performed by: NURSE PRACTITIONER

## 2021-01-28 PROCEDURE — 1240000000 HC EMOTIONAL WELLNESS R&B

## 2021-01-28 PROCEDURE — 6370000000 HC RX 637 (ALT 250 FOR IP): Performed by: NURSE PRACTITIONER

## 2021-01-28 PROCEDURE — 82962 GLUCOSE BLOOD TEST: CPT

## 2021-01-28 PROCEDURE — 6360000002 HC RX W HCPCS: Performed by: PSYCHIATRY & NEUROLOGY

## 2021-01-28 PROCEDURE — 6370000000 HC RX 637 (ALT 250 FOR IP): Performed by: INTERNAL MEDICINE

## 2021-01-28 PROCEDURE — 6360000002 HC RX W HCPCS

## 2021-01-28 RX ORDER — DIVALPROEX SODIUM 250 MG/1
250 TABLET, DELAYED RELEASE ORAL DAILY
Status: ON HOLD | COMMUNITY
End: 2021-02-05 | Stop reason: HOSPADM

## 2021-01-28 RX ORDER — ARIPIPRAZOLE 10 MG/1
10 TABLET ORAL NIGHTLY
Status: DISCONTINUED | OUTPATIENT
Start: 2021-01-28 | End: 2021-02-05 | Stop reason: HOSPADM

## 2021-01-28 RX ORDER — DIVALPROEX SODIUM 250 MG/1
500 TABLET, DELAYED RELEASE ORAL NIGHTLY
Status: DISCONTINUED | OUTPATIENT
Start: 2021-01-28 | End: 2021-02-03

## 2021-01-28 RX ORDER — DIPHENHYDRAMINE HYDROCHLORIDE 50 MG/ML
50 INJECTION INTRAMUSCULAR; INTRAVENOUS ONCE
Status: COMPLETED | OUTPATIENT
Start: 2021-01-28 | End: 2021-01-28

## 2021-01-28 RX ORDER — HALOPERIDOL 5 MG/ML
10 INJECTION INTRAMUSCULAR ONCE
Status: COMPLETED | OUTPATIENT
Start: 2021-01-28 | End: 2021-01-28

## 2021-01-28 RX ORDER — ARIPIPRAZOLE LAUROXIL 882 MG/3.2ML
882 INJECTION, SUSPENSION, EXTENDED RELEASE INTRAMUSCULAR
COMMUNITY
End: 2022-07-23

## 2021-01-28 RX ORDER — PAROXETINE 10 MG/1
10 TABLET, FILM COATED ORAL DAILY
Status: DISCONTINUED | OUTPATIENT
Start: 2021-01-28 | End: 2021-02-05 | Stop reason: HOSPADM

## 2021-01-28 RX ORDER — ARIPIPRAZOLE 10 MG/1
10 TABLET ORAL EVERY EVENING
COMMUNITY

## 2021-01-28 RX ORDER — DIPHENHYDRAMINE HYDROCHLORIDE 50 MG/ML
INJECTION INTRAMUSCULAR; INTRAVENOUS
Status: COMPLETED
Start: 2021-01-28 | End: 2021-01-28

## 2021-01-28 RX ORDER — DIVALPROEX SODIUM 250 MG/1
500 TABLET, DELAYED RELEASE ORAL NIGHTLY
Status: ON HOLD | COMMUNITY
End: 2021-02-05 | Stop reason: SDUPTHER

## 2021-01-28 RX ORDER — PALIPERIDONE 3 MG/1
3 TABLET, EXTENDED RELEASE ORAL DAILY
Status: DISCONTINUED | OUTPATIENT
Start: 2021-01-28 | End: 2021-01-29

## 2021-01-28 RX ORDER — DIVALPROEX SODIUM 250 MG/1
250 TABLET, DELAYED RELEASE ORAL EVERY MORNING
Status: DISCONTINUED | OUTPATIENT
Start: 2021-01-29 | End: 2021-02-03

## 2021-01-28 RX ADMIN — PALIPERIDONE 3 MG: 3 TABLET, EXTENDED RELEASE ORAL at 19:06

## 2021-01-28 RX ADMIN — INSULIN LISPRO 1 UNITS: 100 INJECTION, SOLUTION INTRAVENOUS; SUBCUTANEOUS at 09:09

## 2021-01-28 RX ADMIN — DIPHENHYDRAMINE HYDROCHLORIDE 50 MG: 50 INJECTION, SOLUTION INTRAMUSCULAR; INTRAVENOUS at 22:10

## 2021-01-28 RX ADMIN — DIVALPROEX SODIUM 500 MG: 250 TABLET, DELAYED RELEASE ORAL at 20:31

## 2021-01-28 RX ADMIN — PAROXETINE 10 MG: 10 TABLET, FILM COATED ORAL at 19:06

## 2021-01-28 RX ADMIN — INSULIN LISPRO 2 UNITS: 100 INJECTION, SOLUTION INTRAVENOUS; SUBCUTANEOUS at 20:32

## 2021-01-28 RX ADMIN — DIPHENHYDRAMINE HYDROCHLORIDE 50 MG: 50 INJECTION INTRAMUSCULAR; INTRAVENOUS at 22:10

## 2021-01-28 RX ADMIN — GLIMEPIRIDE 4 MG: 4 TABLET ORAL at 09:07

## 2021-01-28 RX ADMIN — FUROSEMIDE 40 MG: 40 TABLET ORAL at 09:07

## 2021-01-28 RX ADMIN — HALOPERIDOL LACTATE 10 MG: 5 INJECTION, SOLUTION INTRAMUSCULAR at 22:10

## 2021-01-28 RX ADMIN — ARIPIPRAZOLE 10 MG: 10 TABLET ORAL at 20:31

## 2021-01-28 RX ADMIN — ACETAMINOPHEN 650 MG: 325 TABLET ORAL at 13:22

## 2021-01-28 ASSESSMENT — PAIN SCALES - GENERAL
PAINLEVEL_OUTOF10: 0
PAINLEVEL_OUTOF10: 4
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
## 2021-01-29 LAB
METER GLUCOSE: 106 MG/DL (ref 74–99)
METER GLUCOSE: 142 MG/DL (ref 74–99)
METER GLUCOSE: 147 MG/DL (ref 74–99)
METER GLUCOSE: 162 MG/DL (ref 74–99)

## 2021-01-29 PROCEDURE — 1240000000 HC EMOTIONAL WELLNESS R&B

## 2021-01-29 PROCEDURE — 99232 SBSQ HOSP IP/OBS MODERATE 35: CPT | Performed by: NURSE PRACTITIONER

## 2021-01-29 PROCEDURE — 6370000000 HC RX 637 (ALT 250 FOR IP): Performed by: NURSE PRACTITIONER

## 2021-01-29 PROCEDURE — 6370000000 HC RX 637 (ALT 250 FOR IP): Performed by: INTERNAL MEDICINE

## 2021-01-29 PROCEDURE — 82962 GLUCOSE BLOOD TEST: CPT

## 2021-01-29 RX ORDER — PALIPERIDONE 3 MG/1
3 TABLET, EXTENDED RELEASE ORAL 2 TIMES DAILY
Status: DISCONTINUED | OUTPATIENT
Start: 2021-01-29 | End: 2021-02-03

## 2021-01-29 RX ADMIN — PALIPERIDONE 3 MG: 3 TABLET, EXTENDED RELEASE ORAL at 21:34

## 2021-01-29 RX ADMIN — INSULIN LISPRO 1 UNITS: 100 INJECTION, SOLUTION INTRAVENOUS; SUBCUTANEOUS at 08:19

## 2021-01-29 RX ADMIN — INSULIN LISPRO 1 UNITS: 100 INJECTION, SOLUTION INTRAVENOUS; SUBCUTANEOUS at 12:03

## 2021-01-29 RX ADMIN — DIVALPROEX SODIUM 250 MG: 250 TABLET, DELAYED RELEASE ORAL at 08:20

## 2021-01-29 RX ADMIN — INSULIN LISPRO 1 UNITS: 100 INJECTION, SOLUTION INTRAVENOUS; SUBCUTANEOUS at 21:36

## 2021-01-29 RX ADMIN — ARIPIPRAZOLE 10 MG: 10 TABLET ORAL at 21:33

## 2021-01-29 RX ADMIN — PAROXETINE 10 MG: 10 TABLET, FILM COATED ORAL at 08:19

## 2021-01-29 RX ADMIN — DIVALPROEX SODIUM 500 MG: 250 TABLET, DELAYED RELEASE ORAL at 21:33

## 2021-01-29 RX ADMIN — GLIMEPIRIDE 4 MG: 4 TABLET ORAL at 08:19

## 2021-01-29 RX ADMIN — FUROSEMIDE 40 MG: 40 TABLET ORAL at 08:19

## 2021-01-29 RX ADMIN — PALIPERIDONE 3 MG: 3 TABLET, EXTENDED RELEASE ORAL at 08:19

## 2021-01-29 ASSESSMENT — PAIN SCALES - GENERAL
PAINLEVEL_OUTOF10: 0
PAINLEVEL_OUTOF10: 0

## 2021-01-29 NOTE — PROGRESS NOTES
Patient is resting quietly in bed with eyes closed at this time. No signs of distress or discomfort noted. No PRN medications given thus far. Safety needs met. No unit problems reported. Purposeful rounding continued.
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
## 2021-01-29 NOTE — GROUP NOTE
This note will not be viewable in [employer] for the following reason(s). This is a Psychotherapy Note. Group Therapy Note    Date: 1/29/2021    Group Start Time: 0130  Group End Time: 0200  Group Topic: Psychoeducation    [facility]  1    YESICA Major LSW        Group Therapy Note    Attendees: 11         Patient's Goal:    Working our way through Trauma        Notes: Patient was on the outskirts of group    Status After Intervention:  Unchanged    Participation Level:  Active Listener    Participation Quality: Lethargic      Speech:  hesitant      Thought Process/Content: Logical      Affective Functioning: Blunted      Mood: depressed      Level of consciousness:  Preoccupied      Response to Learning: Able to retain information      Endings: None Reported    Modes of Intervention: Education      Discipline Responsible: /Counselor      Signature:  YESICA Major LSW

## 2021-01-29 NOTE — PROGRESS NOTES
Attended afternoon meet and greet. Updated on evening expectations and staffing changes. Patient 1 of 11.

## 2021-01-29 NOTE — GROUP NOTE
Group Therapy Note    Date: 1/29/2021    Group Start Time: 1000  Group End Time: 2986  Group Topic: Psychoeducation    [facility] 1    Shilpa Melendez, CTRS        Group Therapy Note    Number of participants: 10  Type of group: Psychoeducation  Mode of intervention: Education, Support, Socialization, Exploration, Clarifying, and Problem-solving  Topic: FACE: Problem Solving   Objective: Pt will identify ways to Find, Action, Coping, and Evaluate (FACE) in recovery. Notes:   Pt was interactive during group sharing ways to utilize the [facility] acronym in recovery. Pt gave support and feedback to others. Status After Intervention:  Improved    Participation Level:  Active Listener and Interactive    Participation Quality: Appropriate, Attentive, Sharing and Supportive      Speech:  normal      Thought Process/Content: Logical      Affective Functioning: Congruent      Mood: euthymic      Level of consciousness:  Alert, Oriented x4 and Attentive      Response to Learning: Able to verbalize current knowledge/experience, Able to verbalize/acknowledge new learning, Able to retain information, Capable of insight, Able to change behavior and Progressing to goal      Endings: None Reported    Modes of Intervention: Education, Support, Socialization, Exploration, Clarifying and Problem-solving
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
## 2021-01-30 LAB
EKG ATRIAL RATE: 86 BPM
EKG P AXIS: 66 DEGREES
EKG P-R INTERVAL: 160 MS
EKG Q-T INTERVAL: 370 MS
EKG QRS DURATION: 82 MS
EKG QTC CALCULATION (BAZETT): 442 MS
EKG R AXIS: 14 DEGREES
EKG T AXIS: 45 DEGREES
EKG VENTRICULAR RATE: 86 BPM
METER GLUCOSE: 103 MG/DL (ref 74–99)
METER GLUCOSE: 155 MG/DL (ref 74–99)
METER GLUCOSE: 164 MG/DL (ref 74–99)
METER GLUCOSE: 89 MG/DL (ref 74–99)

## 2021-01-30 PROCEDURE — 99231 SBSQ HOSP IP/OBS SF/LOW 25: CPT | Performed by: NURSE PRACTITIONER

## 2021-01-30 PROCEDURE — 6370000000 HC RX 637 (ALT 250 FOR IP): Performed by: NURSE PRACTITIONER

## 2021-01-30 PROCEDURE — 82962 GLUCOSE BLOOD TEST: CPT

## 2021-01-30 PROCEDURE — 1240000000 HC EMOTIONAL WELLNESS R&B

## 2021-01-30 PROCEDURE — 6370000000 HC RX 637 (ALT 250 FOR IP): Performed by: INTERNAL MEDICINE

## 2021-01-30 RX ADMIN — PAROXETINE 10 MG: 10 TABLET, FILM COATED ORAL at 08:52

## 2021-01-30 RX ADMIN — DIVALPROEX SODIUM 500 MG: 250 TABLET, DELAYED RELEASE ORAL at 21:01

## 2021-01-30 RX ADMIN — FUROSEMIDE 40 MG: 40 TABLET ORAL at 08:52

## 2021-01-30 RX ADMIN — GLIMEPIRIDE 4 MG: 4 TABLET ORAL at 08:51

## 2021-01-30 RX ADMIN — ARIPIPRAZOLE 10 MG: 10 TABLET ORAL at 21:01

## 2021-01-30 RX ADMIN — PALIPERIDONE 3 MG: 3 TABLET, EXTENDED RELEASE ORAL at 21:01

## 2021-01-30 RX ADMIN — PALIPERIDONE 3 MG: 3 TABLET, EXTENDED RELEASE ORAL at 08:51

## 2021-01-30 RX ADMIN — MAGNESIUM HYDROXIDE/ALUMINUM HYDROXICE/SIMETHICONE 30 ML: 120; 1200; 1200 SUSPENSION ORAL at 17:34

## 2021-01-30 RX ADMIN — INSULIN LISPRO 1 UNITS: 100 INJECTION, SOLUTION INTRAVENOUS; SUBCUTANEOUS at 08:50

## 2021-01-30 RX ADMIN — INSULIN LISPRO 1 UNITS: 100 INJECTION, SOLUTION INTRAVENOUS; SUBCUTANEOUS at 21:05

## 2021-01-30 RX ADMIN — DIVALPROEX SODIUM 250 MG: 250 TABLET, DELAYED RELEASE ORAL at 08:52

## 2021-01-30 ASSESSMENT — PAIN SCALES - GENERAL: PAINLEVEL_OUTOF10: 0

## 2021-01-30 NOTE — PROGRESS NOTES
Performed wound care to lacerations on L forearm. Cleansed wound with alcohol prep via aseptic technique, and applied a DSD.  Lacerations intact with sutures, no s/s of infection
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
## 2021-01-31 LAB
METER GLUCOSE: 100 MG/DL (ref 74–99)
METER GLUCOSE: 147 MG/DL (ref 74–99)
METER GLUCOSE: 218 MG/DL (ref 74–99)
METER GLUCOSE: 60 MG/DL (ref 74–99)

## 2021-01-31 PROCEDURE — 1240000000 HC EMOTIONAL WELLNESS R&B

## 2021-01-31 PROCEDURE — 82962 GLUCOSE BLOOD TEST: CPT

## 2021-01-31 PROCEDURE — 99231 SBSQ HOSP IP/OBS SF/LOW 25: CPT | Performed by: NURSE PRACTITIONER

## 2021-01-31 PROCEDURE — 6370000000 HC RX 637 (ALT 250 FOR IP): Performed by: NURSE PRACTITIONER

## 2021-01-31 PROCEDURE — 6370000000 HC RX 637 (ALT 250 FOR IP): Performed by: INTERNAL MEDICINE

## 2021-01-31 RX ADMIN — ARIPIPRAZOLE 10 MG: 10 TABLET ORAL at 20:46

## 2021-01-31 RX ADMIN — DIVALPROEX SODIUM 250 MG: 250 TABLET, DELAYED RELEASE ORAL at 08:33

## 2021-01-31 RX ADMIN — INSULIN LISPRO 1 UNITS: 100 INJECTION, SOLUTION INTRAVENOUS; SUBCUTANEOUS at 08:32

## 2021-01-31 RX ADMIN — FUROSEMIDE 40 MG: 40 TABLET ORAL at 08:33

## 2021-01-31 RX ADMIN — INSULIN LISPRO 1 UNITS: 100 INJECTION, SOLUTION INTRAVENOUS; SUBCUTANEOUS at 20:46

## 2021-01-31 RX ADMIN — PAROXETINE 10 MG: 10 TABLET, FILM COATED ORAL at 08:34

## 2021-01-31 RX ADMIN — GLIMEPIRIDE 4 MG: 4 TABLET ORAL at 08:34

## 2021-01-31 RX ADMIN — PALIPERIDONE 3 MG: 3 TABLET, EXTENDED RELEASE ORAL at 08:33

## 2021-01-31 RX ADMIN — DIVALPROEX SODIUM 500 MG: 250 TABLET, DELAYED RELEASE ORAL at 20:46

## 2021-01-31 RX ADMIN — PALIPERIDONE 3 MG: 3 TABLET, EXTENDED RELEASE ORAL at 20:46

## 2021-01-31 ASSESSMENT — PAIN SCALES - GENERAL: PAINLEVEL_OUTOF10: 0

## 2021-01-31 NOTE — CARE COORDINATION
THIAGO attempted to call pt mother for collateral [phone] THIAGO left message    SW met with pt, pt reports he is feeling better and reports that plan is to return home with mother once discharged.

## 2021-01-31 NOTE — PROGRESS NOTES
Social History     Socioeconomic History    Marital status: Single     Spouse name: Not on file    Number of children: Not on file    Years of education: Not on file    Highest education level: Not on file   Occupational History    Not on file   Social Needs    Financial resource strain: Not on file    Food insecurity     Worry: Not on file     Inability: Not on file    Transportation needs     Medical: Not on file     Non-medical: Not on file   Tobacco Use    Smoking status: Current Every Day Smoker     Packs/day: 0.75     Years: 27.00     Pack years: 20.25     Types: Cigarettes    Smokeless tobacco: Never Used   Substance and Sexual Activity    Alcohol use: Yes     Alcohol/week: 1.0 standard drinks     Types: 1 Cans of beer per week     Frequency: 2-4 times a month     Drinks per session: 1 or 2     Binge frequency: Never     Comment: occ beer    Drug use: No     Types: Marijuana     Comment: past use of marijuana;  none since 1999    Sexual activity: Not on file   Lifestyle    Physical activity     Days per week: Not on file     Minutes per session: Not on file    Stress: Not on file   Relationships    Social connections     Talks on phone: Not on file     Gets together: Not on file     Attends Restoration service: Not on file     Active member of club or organization: Not on file     Attends meetings of clubs or organizations: Not on file     Relationship status: Not on file    Intimate partner violence     Fear of current or ex partner: Not on file     Emotionally abused: Not on file     Physically abused: Not on file     Forced sexual activity: Not on file   Other Topics Concern    Not on file   Social History Narrative    Drinks 1 cup of coffee daily and occ energy drink           ROS:  [x] All negative/unchanged except if checked.  Explain positive(checked items) below:  [] Constitutional  [] Eyes  [] Ear/Nose/Mouth/Throat  [] Respiratory  [] CV  [] GI  []   [] Musculoskeletal  [] Skin/Breast  [] Neurological  [] Endocrine  [] Heme/Lymph  [] Allergic/Immunologic    Explanation:     MEDICATIONS:    Current Facility-Administered Medications:     paliperidone (INVEGA) extended release tablet 3 mg, 3 mg, Oral, BID, Lavell Santos, APRN - CNP, 3 mg at 01/31/21 3899    divalproex (DEPAKOTE) DR tablet 250 mg, 250 mg, Oral, QAM, Lavell Santos, APRN - CNP, 250 mg at 01/31/21 3802    ARIPiprazole (ABILIFY) tablet 10 mg, 10 mg, Oral, Nightly, Lavell Santos, APRN - CNP, 10 mg at 01/30/21 2101    PARoxetine (PAXIL) tablet 10 mg, 10 mg, Oral, Daily, Lavell Santos, APRN - CNP, 10 mg at 01/31/21 0834    divalproex (DEPAKOTE) DR tablet 500 mg, 500 mg, Oral, Nightly, Lavell Santos, APRN - CNP, 500 mg at 01/30/21 2101    lidocaine 1 % injection 5 mL, 5 mL, Intradermal, Once, Satinder Urrutia MD    acetaminophen (TYLENOL) tablet 650 mg, 650 mg, Oral, S6H PRN, Rylee Mojica Dellick, APRN - CNP, 615 mg at 01/28/21 1322    haloperidol lactate (HALDOL) injection 5 mg, 5 mg, Intramuscular, Q4H PRN **OR** haloperidol (HALDOL) tablet 5 mg, 5 mg, Oral, I7D PRN, Rylee Mojica Dellick, APRN - CNP    traZODone (DESYREL) tablet 50 mg, 50 mg, Oral, Nightly PRN, Rylee Mojica Dellick, APRN - CNP    magnesium hydroxide (MILK OF MAGNESIA) 400 MG/5ML suspension 30 mL, 30 mL, Oral, Daily PRN, Rylee Mojica Dellick, APRN - CNP    aluminum & magnesium hydroxide-simethicone (MAALOX) 200-200-20 MG/5ML suspension 30 mL, 30 mL, Oral, O9Z PRN, Rylee Mojica Dellick, APRN - CNP, 30 mL at 01/30/21 5664    influenza quadrivalent split vaccine (FLUZONE;FLUARIX;FLULAVAL;AFLURIA) injection 0.5 mL, 0.5 mL, Intramuscular, Prior to discharge, ISSA Kraft - CNP    nicotine polacrilex (COMMIT) lozenge 2 mg, 2 mg, Oral, PRN, Rylee ISSA Vergara - CNP    glimepiride (AMARYL) tablet 4 mg, 4 mg, Oral, Daily with breakfast, Mara Forbes DO, 4 mg at 01/31/21 0834    furosemide (LASIX) tablet 40 mg, 40 mg, Oral, Daily, Mara Forbes DO, 40 mg at 01/31/21 8056    insulin lispro (HUMALOG) injection vial 0-6 Units, 0-6 Units, Subcutaneous, TID WC, Reatha Challenger, DO, 1 Units at 01/31/21 8479    insulin lispro (HUMALOG) injection vial 0-3 Units, 0-3 Units, Subcutaneous, Nightly, Reatha Challenger, DO, 1 Units at 01/30/21 2105    glucose (GLUTOSE) 40 % oral gel 15 g, 15 g, Oral, PRN, Reatha Challenger, DO    dextrose 50 % IV solution, 12.5 g, Intravenous, PRN, Reatha Challenger, DO    glucagon (rDNA) injection 1 mg, 1 mg, Intramuscular, PRN, Reatha Challenger, DO    dextrose 5 % solution, 100 mL/hr, Intravenous, PRN, Reatha Challenger, DO      Examination:  BP (!) 142/74   Pulse 86   Temp 97.2 °F (36.2 °C) (Temporal)   Resp 16   Ht 5' 9\" (1.753 m)   Wt 270 lb (122.5 kg)   SpO2 99%   BMI 39.87 kg/m²   Gait - steady  Medication side effects(SE):none  Mental Status Examination:    Level of consciousness:  within normal limits   Appearance:  fair grooming and fair hygiene  Behavior/Motor:  no abnormalities noted  Attitude toward examiner:  guarded  Speech:  slow   Mood: decreased range  Affect:  blunted  Thought processes:  illogical   Thought content:  Perceptual Disturbance:  auditory  Cognition:  oriented to person, place, and time   Concentration poor  Insight poor   Judgement poor     ASSESSMENT:   Patient symptoms are:  [] Well controlled  [] Improving  [] Worsening  [x] No change      Diagnosis: Principal Problem:    Schizoaffective disorder, bipolar type (HCC)  Resolved Problems:    * No resolved hospital problems. *      LABS:    No results for input(s): WBC, HGB, PLT in the last 72 hours. No results for input(s): NA, K, CL, CO2, BUN, CREATININE, GLUCOSE in the last 72 hours. No results for input(s): BILITOT, ALKPHOS, AST, ALT in the last 72 hours.   Lab Results   Component Value Date    LABAMPH NOT DETECTED 01/26/2021    [address] NOT DETECTED 06/30/2012    BARBSCNU NOT DETECTED 01/26/2021    LABBENZ NOT DETECTED 01/26/2021    LABBENZ NOT DETECTED 06/30/2012    CANNAB NOT DETECTED  01/12/2014    COCAINESCRN NOT DETECTED 01/12/2014    LABMETH NOT DETECTED 01/26/2021    OPIATESCREENURINE NOT DETECTED 01/26/2021    PHENCYCLIDINESCREENURINE NOT DETECTED 01/26/2021    PPXUR NOT DETECTED 02/17/2013    ETOH <10 01/26/2021     Lab Results   Component Value Date    TSH 0.494 09/21/2020     No results found for: LITHIUM  Lab Results   Component Value Date    VALPROATE 27 (L) 01/26/2021            Treatment Plan:  Plan of care and medications has been reviewed with Dr. Liam Burnett in the collaborative care team.  Reviewed current Medications with the patient. Risk, benefit, side effects, possible outcomes of the medication and alternatives discussed with the patient and the patient demonstrated understanding. The patient was also educated that the outcome of treatment will depend on the medication compliance as directed by the prescribers along with regular follow-up, compliance with the labs and other work-up, as clinically indicated. Risks, benefits, side effects, drug-to-drug interactions and alternatives to treatment were discussed. depakote 250 mg daily q AM for mood stabilization  Depakote 500 mg qhs for mood stabilization  Abilify 10 mg qhs plan to increase as needed in prep for Aristada injection. Invega 3 mg twice daily psychosis  Paxil 10 mg daily for hypersexuality reduction  Plan for Aristada 8 8 2 mg IM every 30 days       Collateral information: Followed by social work  CD evaluation  Encourage patient to attend group and other milieu activities.   Discharge planning discussed with the patient and treatment team.    PSYCHOTHERAPY/COUNSELING:  [x] Therapeutic interview  [x] Supportive  [] CBT  [] Ongoing  [] Other    [x] Patient continues to need, on a daily basis, active treatment furnished directly by or requiring the supervision of inpatient psychiatric personnel      Anticipated Length of stay: 5 to 10 days based on stability          Electronically signed by Chun Mueller APRN - CNP on 1/31/2021 at 1:42 PM

## 2021-02-01 LAB
METER GLUCOSE: 116 MG/DL (ref 74–99)
METER GLUCOSE: 134 MG/DL (ref 74–99)
METER GLUCOSE: 148 MG/DL (ref 74–99)
METER GLUCOSE: 91 MG/DL (ref 74–99)

## 2021-02-01 PROCEDURE — 6370000000 HC RX 637 (ALT 250 FOR IP): Performed by: NURSE PRACTITIONER

## 2021-02-01 PROCEDURE — 1240000000 HC EMOTIONAL WELLNESS R&B

## 2021-02-01 PROCEDURE — 99232 SBSQ HOSP IP/OBS MODERATE 35: CPT | Performed by: NURSE PRACTITIONER

## 2021-02-01 PROCEDURE — 6370000000 HC RX 637 (ALT 250 FOR IP): Performed by: INTERNAL MEDICINE

## 2021-02-01 PROCEDURE — 82962 GLUCOSE BLOOD TEST: CPT

## 2021-02-01 RX ADMIN — ARIPIPRAZOLE 10 MG: 10 TABLET ORAL at 20:41

## 2021-02-01 RX ADMIN — PALIPERIDONE 3 MG: 3 TABLET, EXTENDED RELEASE ORAL at 09:13

## 2021-02-01 RX ADMIN — DIVALPROEX SODIUM 500 MG: 250 TABLET, DELAYED RELEASE ORAL at 20:41

## 2021-02-01 RX ADMIN — DIVALPROEX SODIUM 250 MG: 250 TABLET, DELAYED RELEASE ORAL at 09:12

## 2021-02-01 RX ADMIN — PAROXETINE 10 MG: 10 TABLET, FILM COATED ORAL at 09:12

## 2021-02-01 RX ADMIN — FUROSEMIDE 40 MG: 40 TABLET ORAL at 09:13

## 2021-02-01 RX ADMIN — PALIPERIDONE 3 MG: 3 TABLET, EXTENDED RELEASE ORAL at 20:41

## 2021-02-01 RX ADMIN — INSULIN LISPRO 1 UNITS: 100 INJECTION, SOLUTION INTRAVENOUS; SUBCUTANEOUS at 12:03

## 2021-02-01 RX ADMIN — GLIMEPIRIDE 4 MG: 4 TABLET ORAL at 09:13

## 2021-02-01 ASSESSMENT — PAIN SCALES - GENERAL: PAINLEVEL_OUTOF10: 0

## 2021-02-01 NOTE — GROUP NOTE
Group Therapy Note    Date: 2/1/2021    Group Start Time: 1115  Group End Time: 1200  Group Topic: Cognitive Skills    [facility]    JACQUELINE Shah        Group Therapy Note    Attendees: 16         Patient's Goal:  Pt will participate in discussion regarding healthy vs unhealthy relationships. Notes: Pt participated in discussion re: characteristics of healthy and unhealthy relationships. Status After Intervention:  Improved    Participation Level:  Active Listener and Interactive    Participation Quality: Appropriate, Attentive, Sharing and Supportive      Speech:  normal      Thought Process/Content: Logical      Affective Functioning: Blunted      Mood: depressed      Level of consciousness:  Alert, Oriented x4 and Attentive      Response to Learning: Able to verbalize current knowledge/experience, Able to verbalize/acknowledge new learning and Progressing to goal      Endings: None Reported    Modes of Intervention: Education, Support, Socialization and Problem-solving      Discipline Responsible: /Counselor      Signature:  JACQUELINE Jerez
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
## 2021-02-01 NOTE — PROGRESS NOTES
Spoke with mariela palacios at [facility]. aristada 882 mg every 4 weeks.  Given jan 23 th and due feb 16 th

## 2021-02-02 LAB
METER GLUCOSE: 117 MG/DL (ref 74–99)
METER GLUCOSE: 117 MG/DL (ref 74–99)
METER GLUCOSE: 155 MG/DL (ref 74–99)
METER GLUCOSE: 160 MG/DL (ref 74–99)

## 2021-02-02 PROCEDURE — 1240000000 HC EMOTIONAL WELLNESS R&B

## 2021-02-02 PROCEDURE — 6370000000 HC RX 637 (ALT 250 FOR IP): Performed by: NURSE PRACTITIONER

## 2021-02-02 PROCEDURE — 6370000000 HC RX 637 (ALT 250 FOR IP): Performed by: INTERNAL MEDICINE

## 2021-02-02 PROCEDURE — 99232 SBSQ HOSP IP/OBS MODERATE 35: CPT | Performed by: NURSE PRACTITIONER

## 2021-02-02 PROCEDURE — 82962 GLUCOSE BLOOD TEST: CPT

## 2021-02-02 RX ADMIN — FUROSEMIDE 40 MG: 40 TABLET ORAL at 08:36

## 2021-02-02 RX ADMIN — PAROXETINE 10 MG: 10 TABLET, FILM COATED ORAL at 08:35

## 2021-02-02 RX ADMIN — MAGNESIUM HYDROXIDE/ALUMINUM HYDROXICE/SIMETHICONE 30 ML: 120; 1200; 1200 SUSPENSION ORAL at 00:25

## 2021-02-02 RX ADMIN — PALIPERIDONE 3 MG: 3 TABLET, EXTENDED RELEASE ORAL at 08:35

## 2021-02-02 RX ADMIN — PALIPERIDONE 3 MG: 3 TABLET, EXTENDED RELEASE ORAL at 20:58

## 2021-02-02 RX ADMIN — DIVALPROEX SODIUM 250 MG: 250 TABLET, DELAYED RELEASE ORAL at 08:36

## 2021-02-02 RX ADMIN — INSULIN LISPRO 1 UNITS: 100 INJECTION, SOLUTION INTRAVENOUS; SUBCUTANEOUS at 17:03

## 2021-02-02 RX ADMIN — MAGNESIUM HYDROXIDE/ALUMINUM HYDROXICE/SIMETHICONE 30 ML: 120; 1200; 1200 SUSPENSION ORAL at 22:05

## 2021-02-02 RX ADMIN — ARIPIPRAZOLE 10 MG: 10 TABLET ORAL at 20:58

## 2021-02-02 RX ADMIN — DIVALPROEX SODIUM 500 MG: 250 TABLET, DELAYED RELEASE ORAL at 20:58

## 2021-02-02 RX ADMIN — GLIMEPIRIDE 4 MG: 4 TABLET ORAL at 08:36

## 2021-02-02 RX ADMIN — INSULIN LISPRO 1 UNITS: 100 INJECTION, SOLUTION INTRAVENOUS; SUBCUTANEOUS at 08:39
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
## 2021-02-02 NOTE — PROGRESS NOTES
Attended afternoon meet and greet. Aware of evening expectations and treatment team.  Enjoyed groundhog day activity.

## 2021-02-02 NOTE — GROUP NOTE
Group Therapy Note    Date: 2/2/2021    Group Start Time: 1120  Group End Time: 1200  Group Topic: Cognitive Skills    [facility]    JACQUELINE Shah        Group Therapy Note    Attendees: 11         Patient's Goal:  Pt will be able to discuss negative anger cycle and positive anger cycle and identify healthy anger management principles. Notes:  Pt was active participant in group discussion. Pt made positive connections with others. Status After Intervention:  Improved    Participation Level:  Active Listener and Interactive    Participation Quality: Appropriate, Attentive, Sharing and Supportive      Speech:  normal      Thought Process/Content: Logical      Affective Functioning: Blunted      Mood: depressed      Level of consciousness:  Alert, Oriented x4 and Attentive      Response to Learning: Able to verbalize current knowledge/experience, Able to verbalize/acknowledge new learning and Progressing to goal      Endings: None Reported    Modes of Intervention: Education, Support, Socialization and Problem-solving      Discipline Responsible: /Counselor      Signature:  JACQUELINE Ramesh

## 2021-02-03 LAB
METER GLUCOSE: 140 MG/DL (ref 74–99)
METER GLUCOSE: 171 MG/DL (ref 74–99)
METER GLUCOSE: 67 MG/DL (ref 74–99)
METER GLUCOSE: 75 MG/DL (ref 74–99)
METER GLUCOSE: 88 MG/DL (ref 74–99)

## 2021-02-03 PROCEDURE — 6370000000 HC RX 637 (ALT 250 FOR IP): Performed by: INTERNAL MEDICINE

## 2021-02-03 PROCEDURE — 82962 GLUCOSE BLOOD TEST: CPT

## 2021-02-03 PROCEDURE — 99232 SBSQ HOSP IP/OBS MODERATE 35: CPT | Performed by: NURSE PRACTITIONER

## 2021-02-03 PROCEDURE — 1240000000 HC EMOTIONAL WELLNESS R&B

## 2021-02-03 PROCEDURE — 6370000000 HC RX 637 (ALT 250 FOR IP): Performed by: NURSE PRACTITIONER

## 2021-02-03 RX ORDER — PALIPERIDONE 3 MG/1
3 TABLET, EXTENDED RELEASE ORAL DAILY
Status: DISCONTINUED | OUTPATIENT
Start: 2021-02-04 | End: 2021-02-05 | Stop reason: HOSPADM

## 2021-02-03 RX ORDER — DIVALPROEX SODIUM 500 MG/1
500 TABLET, DELAYED RELEASE ORAL EVERY 12 HOURS SCHEDULED
Status: DISCONTINUED | OUTPATIENT
Start: 2021-02-03 | End: 2021-02-05 | Stop reason: HOSPADM

## 2021-02-03 RX ORDER — PALIPERIDONE 6 MG/1
6 TABLET, EXTENDED RELEASE ORAL NIGHTLY
Status: DISCONTINUED | OUTPATIENT
Start: 2021-02-03 | End: 2021-02-05 | Stop reason: HOSPADM

## 2021-02-03 RX ADMIN — DIVALPROEX SODIUM 500 MG: 250 TABLET, DELAYED RELEASE ORAL at 20:32

## 2021-02-03 RX ADMIN — DIVALPROEX SODIUM 250 MG: 250 TABLET, DELAYED RELEASE ORAL at 09:15

## 2021-02-03 RX ADMIN — TRAZODONE HYDROCHLORIDE 50 MG: 50 TABLET ORAL at 20:32

## 2021-02-03 RX ADMIN — Medication 15 G: at 16:06

## 2021-02-03 RX ADMIN — PAROXETINE 10 MG: 10 TABLET, FILM COATED ORAL at 09:15

## 2021-02-03 RX ADMIN — FUROSEMIDE 40 MG: 40 TABLET ORAL at 09:15

## 2021-02-03 RX ADMIN — PALIPERIDONE 6 MG: 6 TABLET, FILM COATED, EXTENDED RELEASE ORAL at 20:32

## 2021-02-03 RX ADMIN — ARIPIPRAZOLE 10 MG: 10 TABLET ORAL at 20:32

## 2021-02-03 RX ADMIN — GLIMEPIRIDE 4 MG: 4 TABLET ORAL at 09:15

## 2021-02-03 RX ADMIN — INSULIN LISPRO 1 UNITS: 100 INJECTION, SOLUTION INTRAVENOUS; SUBCUTANEOUS at 20:33

## 2021-02-03 RX ADMIN — ACETAMINOPHEN 650 MG: 325 TABLET ORAL at 09:15

## 2021-02-03 RX ADMIN — INSULIN LISPRO 1 UNITS: 100 INJECTION, SOLUTION INTRAVENOUS; SUBCUTANEOUS at 09:13

## 2021-02-03 NOTE — PROGRESS NOTES
RECOMMENDATIONS UPDATE: Take prescribed medication. Attend and participate in groups. Continue to provide emotional support to patient.     GOALS UPDATE:  Time frame for Short-Term Goals: 1-3 days      Rema Gan RN
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
## 2021-02-04 LAB
METER GLUCOSE: 125 MG/DL (ref 74–99)
METER GLUCOSE: 129 MG/DL (ref 74–99)
METER GLUCOSE: 83 MG/DL (ref 74–99)
METER GLUCOSE: 86 MG/DL (ref 74–99)

## 2021-02-04 PROCEDURE — 6370000000 HC RX 637 (ALT 250 FOR IP): Performed by: NURSE PRACTITIONER

## 2021-02-04 PROCEDURE — 6370000000 HC RX 637 (ALT 250 FOR IP): Performed by: INTERNAL MEDICINE

## 2021-02-04 PROCEDURE — 99232 SBSQ HOSP IP/OBS MODERATE 35: CPT | Performed by: NURSE PRACTITIONER

## 2021-02-04 PROCEDURE — 82962 GLUCOSE BLOOD TEST: CPT

## 2021-02-04 PROCEDURE — 1240000000 HC EMOTIONAL WELLNESS R&B

## 2021-02-04 RX ADMIN — ARIPIPRAZOLE 10 MG: 10 TABLET ORAL at 20:43

## 2021-02-04 RX ADMIN — GLIMEPIRIDE 4 MG: 4 TABLET ORAL at 08:53

## 2021-02-04 RX ADMIN — PAROXETINE 10 MG: 10 TABLET, FILM COATED ORAL at 08:53

## 2021-02-04 RX ADMIN — MAGNESIUM HYDROXIDE/ALUMINUM HYDROXICE/SIMETHICONE 30 ML: 120; 1200; 1200 SUSPENSION ORAL at 17:01

## 2021-02-04 RX ADMIN — DIVALPROEX SODIUM 500 MG: 250 TABLET, DELAYED RELEASE ORAL at 08:53

## 2021-02-04 RX ADMIN — PALIPERIDONE 3 MG: 3 TABLET, EXTENDED RELEASE ORAL at 08:53

## 2021-02-04 RX ADMIN — PALIPERIDONE 6 MG: 6 TABLET, FILM COATED, EXTENDED RELEASE ORAL at 20:44

## 2021-02-04 RX ADMIN — DIVALPROEX SODIUM 500 MG: 250 TABLET, DELAYED RELEASE ORAL at 20:44

## 2021-02-04 RX ADMIN — FUROSEMIDE 40 MG: 40 TABLET ORAL at 08:53

## 2021-02-04 ASSESSMENT — PAIN SCALES - GENERAL
PAINLEVEL_OUTOF10: 0
PAINLEVEL_OUTOF10: 0

## 2021-02-04 NOTE — PROGRESS NOTES
Spiritual Support Group Note    Number of Participants in Group:     10                   Time:    2    Goal: Relief from isolation and loneliness             Maylin Sharing             Self-understanding and gain insight              Acceptance and belonging            Recognize they are not alone                Socialization             Empowerment       Encouragement    Topic:  [x] Spiritual Wellness and Self Care                  [] Hope                     [] Connecting with Divine/Others        [x] Thankfulness and Gratitude               [x]  Meaningfulness and Purpose               [] Forgiveness               [] Peace               [] Connect to [facility]      [] Other    Participation Level:   [x] Active Listener   [] Minimal   [] Monopolizing   [] Interactive   [] No Participation   []  Other:     Attention:   [x] Alert   [] Distractible   [] Drowsy   [] Poor   [] Other:    Manner:   [x] Cooperative   [] Suspicious   [] Withdrawn   [] Guarded   [] Irritable   [] Inhospitable   [] Other:     Others Comments from Group:

## 2021-02-04 NOTE — PROGRESS NOTES
On phone at times. Laceration on inside of right arm slightly edematous. States has been washing with soap and water and putting lotion on lacerations. Instructed client not to put lotion on lacerations, could cause infection.  just wants it to be cleaned and patted dry only. verbalized understanding.

## 2021-02-05 VITALS
WEIGHT: 270 LBS | OXYGEN SATURATION: 98 % | SYSTOLIC BLOOD PRESSURE: 166 MMHG | RESPIRATION RATE: 16 BRPM | TEMPERATURE: 98.3 F | HEART RATE: 82 BPM | BODY MASS INDEX: 39.99 KG/M2 | DIASTOLIC BLOOD PRESSURE: 70 MMHG | HEIGHT: 69 IN

## 2021-02-05 LAB
METER GLUCOSE: 106 MG/DL (ref 74–99)
METER GLUCOSE: 115 MG/DL (ref 74–99)
METER GLUCOSE: 80 MG/DL (ref 74–99)

## 2021-02-05 PROCEDURE — 99239 HOSP IP/OBS DSCHRG MGMT >30: CPT | Performed by: NURSE PRACTITIONER

## 2021-02-05 PROCEDURE — 6370000000 HC RX 637 (ALT 250 FOR IP): Performed by: NURSE PRACTITIONER

## 2021-02-05 PROCEDURE — 82962 GLUCOSE BLOOD TEST: CPT

## 2021-02-05 PROCEDURE — 6370000000 HC RX 637 (ALT 250 FOR IP): Performed by: INTERNAL MEDICINE

## 2021-02-05 RX ORDER — PALIPERIDONE 3 MG/1
3 TABLET, EXTENDED RELEASE ORAL DAILY
Qty: 30 TABLET | Refills: 0 | Status: ON HOLD | OUTPATIENT
Start: 2021-02-06 | End: 2021-06-16 | Stop reason: HOSPADM

## 2021-02-05 RX ORDER — BENZTROPINE MESYLATE 0.5 MG/1
0.5 TABLET ORAL 2 TIMES DAILY
Qty: 60 TABLET | Refills: 0 | Status: SHIPPED | OUTPATIENT
Start: 2021-02-05 | End: 2021-06-11

## 2021-02-05 RX ORDER — PALIPERIDONE 6 MG/1
6 TABLET, EXTENDED RELEASE ORAL NIGHTLY
Qty: 30 TABLET | Refills: 0 | Status: SHIPPED | OUTPATIENT
Start: 2021-02-05 | End: 2022-03-14

## 2021-02-05 RX ORDER — DIVALPROEX SODIUM 250 MG/1
500 TABLET, DELAYED RELEASE ORAL NIGHTLY
Qty: 60 TABLET | Refills: 0 | Status: SHIPPED | OUTPATIENT
Start: 2021-02-05 | End: 2022-03-14

## 2021-02-05 RX ADMIN — GLIMEPIRIDE 4 MG: 4 TABLET ORAL at 08:52

## 2021-02-05 RX ADMIN — FUROSEMIDE 40 MG: 40 TABLET ORAL at 08:52

## 2021-02-05 RX ADMIN — MAGNESIUM HYDROXIDE/ALUMINUM HYDROXICE/SIMETHICONE 30 ML: 120; 1200; 1200 SUSPENSION ORAL at 09:53

## 2021-02-05 RX ADMIN — PAROXETINE 10 MG: 10 TABLET, FILM COATED ORAL at 08:52

## 2021-02-05 RX ADMIN — DIVALPROEX SODIUM 500 MG: 250 TABLET, DELAYED RELEASE ORAL at 08:51

## 2021-02-05 RX ADMIN — PALIPERIDONE 3 MG: 3 TABLET, EXTENDED RELEASE ORAL at 08:51

## 2021-02-05 NOTE — PROGRESS NOTES
CLINICAL PHARMACY NOTE: MEDS TO [address] Select Patient?: No  Total # of Prescriptions Filled: 3   The following medications were delivered to the patient:  · paliperidone er 3  · paliperidone er 6  · Divalproex sodium 250 dr  Total # of Interventions Completed: 3  Time Spent (min): 30    Additional Documentation:
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
## 2021-02-05 NOTE — GROUP NOTE
Group Therapy Note    Date: 2/5/2021    Group Start Time: 1000  Group End Time: 3740  Group Topic: Psychoeducation    [facility]    Shilpa Melendez, CTRS        Group Therapy Note    Number of participants: 14  Type of group: Psychoeducation  Mode of intervention: Education, Support, Socialization, Exploration, Clarifying, and Problem-solving  Topic: Self Management Skills  Objective: Pt will identify 1 way to utilize self management skills in recovery. Notes:  Pt offered minimal interaction during group but was an active listener throughout. Status After Intervention:  Unchanged    Participation Level:  Active Listener and Minimal    Participation Quality: Appropriate and Attentive      Speech:  normal      Thought Process/Content: Logical      Affective Functioning: Congruent      Mood: euthymic      Level of consciousness:  Alert, Oriented x4 and Attentive      Response to Learning: Able to verbalize current knowledge/experience, Able to verbalize/acknowledge new learning, Able to retain information, Capable of insight, Able to change behavior and Progressing to goal      Endings: None Reported    Modes of Intervention: Education, Support, Socialization, Exploration, Clarifying and Problem-solving

## 2021-02-05 NOTE — GROUP NOTE
Group Therapy Note    Date: 2/5/2021    Group Start Time: 1120  Group End Time: 1200  Group Topic: Cognitive Skills    [facility]    JACQUELINE Shah        Group Therapy Note    Attendees: 16         Patient's Goal:  Pt will be able to identify importance of using distraction as a coping skill to change negative thoughts. Pt will engage in grounding exercises during group    Notes:  Pt was an active participant in group discussion and activity. Pt made positive connections with others. Status After Intervention:  Improved    Participation Level:  Active Listener and Interactive    Participation Quality: Appropriate, Attentive, Sharing and Supportive      Speech:  normal      Thought Process/Content: Logical      Affective Functioning: congruent      Mood: euthymic      Level of consciousness:  Alert, Oriented x4 and Attentive      Response to Learning: Able to verbalize current knowledge/experience, Able to verbalize/acknowledge new learning and Progressing to goal      Endings: None Reported    Modes of Intervention: Education, Support, Socialization and Activity      Discipline Responsible: /Counselor      Signature:  JACQUELINE Colunga

## 2021-02-05 NOTE — PLAN OF CARE
Patient A+ O x 4, denies SI, HI, and hallucinations. Rates anxiety 6/10, and depression 6/10. Good eye contact, flat affect. Responds appropriately to environment.  Calm demeanor  Problem: Altered Mood, Psychotic Behavior:  Goal: Able to demonstrate trust by eating, participating in treatment and following staff's direction  Description: Able to demonstrate trust by eating, participating in treatment and following staff's direction  1/30/2021 0959 by Sugey Camacho RN  Outcome: Ongoing     Problem: Altered Mood, Psychotic Behavior:  Goal: Able to verbalize decrease in frequency and intensity of hallucinations  Description: Able to verbalize decrease in frequency and intensity of hallucinations  1/30/2021 0959 by Sugey Camacho RN  Outcome: Ongoing
Patient denies suicidal and homicidal ideations. Patient denies any hallucinations. Patient presents calm and cooperative. Patient out on unit for groups and meals. Patient is complaint with medications. No complaints or concerns verbalized. Will continue to monitor and support patient.   Problem: Altered Mood, Psychotic Behavior:  Goal: Able to verbalize decrease in frequency and intensity of hallucinations  Description: Able to verbalize decrease in frequency and intensity of hallucinations  Outcome: Met This Shift     Problem: Altered Mood, Psychotic Behavior:  Goal: Absence of self-harm  Description: Absence of self-harm  Outcome: Met This Shift   Electronically signed by Rupal Huffman RN on 2/3/2021 at 12:31 PM
Patient denies suicidal ideation and homicidal ideations. Patient denies auditory and tactile hallucinations, but reports having \"visions\". When asked if he could elaborate, patient states \"people and faces\". Denies anxiety and depression. Presents calm and cooperative during assessment. Patient is isolative to room this evening, does not seem to be social with peers. Medications taken without issue. No complaints or concerns verbalized at this time. No unit problems reported. Will continue to observe and support.     Problem: Altered Mood, Psychotic Behavior:  Goal: Able to verbalize decrease in frequency and intensity of hallucinations  Description: Able to verbalize decrease in frequency and intensity of hallucinations  2/2/2021 2209 by Lianna Ho RN  Outcome: Ongoing     Problem: Altered Mood, Psychotic Behavior:  Goal: Able to verbalize reality based thinking  Description: Able to verbalize reality based thinking  2/2/2021 2209 by Lianna Ho RN  Outcome: Ongoing     Problem: Altered Mood, Psychotic Behavior:  Goal: Absence of self-harm  Description: Absence of self-harm  Outcome: Ongoing
Patient denies suicidal ideation and homicidal ideations. Patient denies visual hallucinations, reports auditory hallucinations. When asked what he is hearing what are the voices saying, patient states \"the voices make me laugh but sometimes they piss me off. \"  Patient denies anxiety and depression. Patient appears flat, guarded, evasive, blunt with responses, eye contact improving. Presents calm and cooperative during assessment. Patient is isolative to room and does not appear to be social with peers. Medications taken without issue. No complaints or concerns verbalized at this time. No unit problems reported. Will continue to observe and support.     Problem: Altered Mood, Psychotic Behavior:  Goal: Able to demonstrate trust by eating, participating in treatment and following staff's direction  Description: Able to demonstrate trust by eating, participating in treatment and following staff's direction  Outcome: Ongoing     Problem: Altered Mood, Psychotic Behavior:  Goal: Able to verbalize decrease in frequency and intensity of hallucinations  Description: Able to verbalize decrease in frequency and intensity of hallucinations  2/4/2021 2037 by Chung Eng RN  Outcome: Ongoing
Patient denies suicidal ideation, homicidal ideations and AVH. Presents calm and cooperative during assessment. Patient appears flat, sad, anxious, depressed, paranoid and evasive. Patient denies depression but rates anxiety 6 out of 10. Patient blunt with responses and has minimal eye contact. Patient is isolative to room and does not appear to be social with peers. Medications taken without issue. No complaints or concerns verbalized at this time. No unit problems reported. Will continue to observe and support.     Problem: Altered Mood, Psychotic Behavior:  Goal: Able to demonstrate trust by eating, participating in treatment and following staff's direction  Description: Able to demonstrate trust by eating, participating in treatment and following staff's direction  Outcome: Ongoing     Problem: Altered Mood, Psychotic Behavior:  Goal: Able to verbalize decrease in frequency and intensity of hallucinations  Description: Able to verbalize decrease in frequency and intensity of hallucinations  1/28/2021 2133 by Rema Castellanos RN  Outcome: Ongoing     Problem: Altered Mood, Psychotic Behavior:  Goal: Absence of self-harm  Description: Absence of self-harm  1/28/2021 2133 by Rema Castellanos RN  Outcome: Ongoing
Patient is quiet to self when on the unit but is polite when approached. He attends some groups but sits away from peers and has minimal participation. He reports his auditory hallucinations have decreased, when asked if he is still hearing his brother's voice he said \"not so much\", he and reports it sounds like \"conversations\"; he denies hearing commands. He reports music helps distract him. He denies anxiety or depression and feels ready for discharge.      Problem: Altered Mood, Psychotic Behavior:  Goal: Able to verbalize decrease in frequency and intensity of hallucinations  Description: Able to verbalize decrease in frequency and intensity of hallucinations  Outcome: Met This Shift     Problem: Altered Mood, Psychotic Behavior:  Goal: Absence of self-harm  Description: Absence of self-harm  2/4/2021 1541 by Hemant Coker RN  Outcome: Met This Shift     Problem: Altered Mood, Psychotic Behavior:  Goal: Compliance with prescribed medication regimen will improve  Description: Compliance with prescribed medication regimen will improve  Outcome: Met This Shift     Problem: Altered Mood, Psychotic Behavior:  Goal: Ability to interact with others will improve  Description: Ability to interact with others will improve  2/4/2021 1541 by Hemant Coker, RN  Outcome: Ongoing
Problem: Altered Mood, Psychotic Behavior:  Goal: Able to demonstrate trust by eating, participating in treatment and following staff's direction  Description: Able to demonstrate trust by eating, participating in treatment and following staff's direction  Outcome: Ongoing  Goal: Able to verbalize decrease in frequency and intensity of hallucinations  Description: Able to verbalize decrease in frequency and intensity of hallucinations  Outcome: Ongoing  Goal: Able to verbalize reality based thinking  Description: Able to verbalize reality based thinking  Outcome: Ongoing  Goal: Ability to interact with others will improve  Description: Ability to interact with others will improve  Outcome: Ongoing
Problem: Altered Mood, Psychotic Behavior:  Goal: Able to verbalize decrease in frequency and intensity of hallucinations  Description: Able to verbalize decrease in frequency and intensity of hallucinations  1/29/2021 0854 by Smita Alberts RN  Outcome: Ongoing  1/28/2021 2133 by Marielle Carbajal RN  Outcome: Ongoing     Problem: Altered Mood, Psychotic Behavior:  Goal: Able to demonstrate trust by eating, participating in treatment and following staff's direction  Description: Able to demonstrate trust by eating, participating in treatment and following staff's direction  1/29/2021 0854 by Smita Alberts RN  Outcome: Ongoing  1/28/2021 2133 by Marielle Carbajal RN  Outcome: Ongoing
Problem: Altered Mood, Psychotic Behavior:  Goal: Able to verbalize decrease in frequency and intensity of hallucinations  Description: Able to verbalize decrease in frequency and intensity of hallucinations  Outcome: Ongoing  Goal: Able to verbalize reality based thinking  Description: Able to verbalize reality based thinking  Outcome: Ongoing          Patient watchful and suspicious but improving. Improved eye contact. Reports the voices are less. Denies suicidal and homicidal  thoughts. Right forearm sutures are well approximated with redness and drainage.
Pt resting in bed apparently asleep with easy even respirations at HS q 15 min electronic rounding.
improve  Outcome: Ongoing

## 2021-02-05 NOTE — BH NOTE
Report called to SUNIL PHELAN [facility], SW to arrange for transport.

## 2021-02-05 NOTE — PROGRESS NOTES
[facility]  Discharge Note    Pt discharged with followings belongings:   Dentures: None  Vision - Corrective Lenses: Glasses  Hearing Aid: None  Jewelry: None  Body Piercings Removed: N/A  Clothing: Footwear, Jacket / coat, Shirt, Pants, Socks  Were All Patient Medications Collected?: Not Applicable  Other Valuables: Cell phone, Betzaida Roldan sent home with client. Valuables retrieved from locker  and returned to patient. Patient education on aftercare instructions: by previous RN ,Lizbeth Rodas and reviewed by this Isai Reynolds . Client given Diabetic packet and cllent's mother to take his home meds to the crisis unit tonight. Also given phone number to the crisis unit. Patient verbalize understanding of AVS: and copy given. Safety plan completed with client. Denies suicidal ideations or hallucinattions.     Status EXAM upon discharge:  Status and Exam  Normal: No  Facial Expression: Flat, Expressionless  Affect: Blunt  Level of Consciousness: Alert  Mood:Normal: No  Mood: Suspicious  Motor Activity:Normal: No  Motor Activity: Decreased  Interview Behavior: Evasive  Preception: [place] to Person, [place] to Time, [place] to Place, [place] to Situation  Attention:Normal: No  Attention: Distractible  Thought Processes: Circumstantial  Thought Content:Normal: No  Thought Content: Preoccupations  Hallucinations: None  Delusions: No  Delusions: Persecution  Memory:Normal: No  Memory: Poor Recent  Insight and Judgment: No  Insight and Judgment: Poor Judgment, Poor Insight  Present Suicidal Ideation: No  Present Homicidal Ideation: No      Metabolic Screening:    Lab Results   Component Value Date    LABA1C 7.7 (H) 07/16/2020       Lab Results   Component Value Date    CHOL 135 01/07/2020    CHOL 113 05/30/2019    CHOL 127 09/26/2018    CHOL 138 04/16/2018    CHOL 225 (H) 08/22/2017    CHOL 199 08/12/2013    CHOL 174 07/01/2012     Lab Results   Component Value Date    TRIG 113 01/07/2020    TRIG 97 05/30/2019 TRIG 150 (H) 09/26/2018    TRIG 100 04/16/2018    TRIG 211 (H) 08/22/2017    TRIG 200 (H) 08/12/2013    TRIG 134 07/01/2012     Lab Results   Component Value Date    HDL 46 01/07/2020    HDL 32 05/30/2019    HDL 31 09/26/2018    HDL 42 04/16/2018    HDL 42 08/22/2017    HDL 42.0 08/12/2013    HDL 29.0 (A) 07/01/2012     No components found for: [facility]  Lab Results   Component Value Date    LABVLDL 23 01/07/2020    LABVLDL 19 05/30/2019    LABVLDL 30 09/26/2018    LABVLDL 42 08/22/2017       Dashawn Infante RN

## 2021-02-05 NOTE — SUICIDE SAFETY PLAN
SAFETY PLAN    A suicide Safety Plan is a document that supports someone when they are having thoughts of suicide. Warning Signs that indicate a suicidal crisis may be developing: What (situations, thoughts, feelings, body sensations, behaviors, etc.) do you experience that lets you know you are beginning to think about suicide? 1.anger  2.hearing voices  3. Mood swings    Internal Coping Strategies:  What things can I do (relaxation techniques, hobbies, physical activities, etc.) to take my mind off my problems without contacting another person? 1. read  2. write  3. talk    People and social settings that provide distraction: Who can I call or where can I go to distract me? 1. Name: telephone -[employer]    2. Name: mother   1. Place: library          4. Place: park    People whom I can ask for help: Who can I call when I need help - for example, friends, family, clergy, someone else? 1. Name: Rommel Kee friend. Phone: [phone]        [facility] or [address] [place], [place] [address]vd I can contact during a crisis: Who can I call for help - for example, my doctor, my psychiatrist, my psychologist, a mental health provider, a suicide hotline? 1. Clinician Name: Dr Lizbeth Morin          2. Clinician Name: [facility]  Phone: [phone]          3. Suicide Prevention Lifeline: 9-598-672-TALK (3313)    4. [address] [place] [address] Emergency Services -  for example, [facility], [facility] suicide hotline, [address] [place] [address]. Urgent care  Quick med  [phone]      Emergency [facility]         Making the environment safe: How can I make my environment (house/apartment/living space) safer? For example, can I remove guns, medications, and other items? 1. Respecting Others   2.  Giving a helping Hand

## 2021-02-05 NOTE — PROGRESS NOTES
Irina Gipson was ordered [place], which is a nonformulary medication. The patient has indicated that the home supply of this medication will be brought in to the hospital for inpatient use. If the medication has not been administered by 1400 on the following day from the time the order was placed, a pharmacist will follow-up with the nurse of the patient to assess the capability of the patient to bring in the medication. If it is determined that the patient cannot supply the medication and it is not available to be dispensed from the pharmacy, a call will be placed to the ordering provider to discuss alternative options. Fawad Marcial Pharm. D.  2/5/2021  9:28 AM

## 2021-02-05 NOTE — CARE COORDINATION
Client was accepted to [facility], RN notified, voucher for co-pay given. RN will follow up and complete N2N.    Electronically signed by Indira Chaudhary on 2/5/2021 at 2:56 PM
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
## 2021-02-10 PROBLEM — F25.9 SCHIZO AFFECTIVE SCHIZOPHRENIA (HCC): Status: ACTIVE | Noted: 2021-02-10

## 2021-05-17 RX ORDER — SPIRONOLACTONE 25 MG/1
25 TABLET ORAL DAILY
Qty: 30 TABLET | Refills: 11 | Status: SHIPPED | OUTPATIENT
Start: 2021-05-17

## 2021-05-17 RX ORDER — FUROSEMIDE 40 MG/1
40 TABLET ORAL DAILY
Qty: 30 TABLET | Refills: 11 | Status: SHIPPED | OUTPATIENT
Start: 2021-05-17

## 2021-05-17 RX ORDER — CARVEDILOL 25 MG/1
TABLET ORAL
Qty: 60 TABLET | Refills: 11 | Status: SHIPPED
Start: 2021-05-17 | End: 2021-06-11

## 2021-05-17 RX ORDER — LISINOPRIL 10 MG/1
10 TABLET ORAL DAILY
Qty: 30 TABLET | Refills: 11 | Status: SHIPPED
Start: 2021-05-17 | End: 2021-06-11

## 2021-06-11 ENCOUNTER — HOSPITAL ENCOUNTER (INPATIENT)
Age: 41
LOS: 5 days | Discharge: HOME | DRG: 885 | End: 2021-06-16
Attending: EMERGENCY MEDICINE | Admitting: PSYCHIATRY & NEUROLOGY
Payer: MEDICARE

## 2021-06-11 DIAGNOSIS — E11.65 TYPE 2 DIABETES MELLITUS WITH HYPERGLYCEMIA, WITH LONG-TERM CURRENT USE OF INSULIN (HCC): ICD-10-CM

## 2021-06-11 DIAGNOSIS — F23 ACUTE PSYCHOSIS (HCC): Primary | ICD-10-CM

## 2021-06-11 DIAGNOSIS — Z79.4 TYPE 2 DIABETES MELLITUS WITH HYPERGLYCEMIA, WITH LONG-TERM CURRENT USE OF INSULIN (HCC): ICD-10-CM

## 2021-06-11 LAB
ACETAMINOPHEN LEVEL: <5 MCG/ML (ref 10–30)
ALBUMIN SERPL-MCNC: 4.3 G/DL (ref 3.5–5.2)
ALP BLD-CCNC: 90 U/L (ref 40–129)
ALT SERPL-CCNC: 11 U/L (ref 0–40)
AMMONIA: 31 UMOL/L (ref 16–60)
AMPHETAMINE SCREEN, URINE: NOT DETECTED
ANION GAP SERPL CALCULATED.3IONS-SCNC: 9 MMOL/L (ref 7–16)
AST SERPL-CCNC: 13 U/L (ref 0–39)
BACTERIA: ABNORMAL /HPF
BARBITURATE SCREEN URINE: NOT DETECTED
BASOPHILS ABSOLUTE: 0.02 E9/L (ref 0–0.2)
BASOPHILS RELATIVE PERCENT: 0.3 % (ref 0–2)
BENZODIAZEPINE SCREEN, URINE: NOT DETECTED
BILIRUB SERPL-MCNC: <0.2 MG/DL (ref 0–1.2)
BILIRUBIN URINE: NEGATIVE
BLOOD, URINE: NEGATIVE
BUN BLDV-MCNC: 14 MG/DL (ref 6–20)
CALCIUM SERPL-MCNC: 9.6 MG/DL (ref 8.6–10.2)
CANNABINOID SCREEN URINE: NOT DETECTED
CHLORIDE BLD-SCNC: 98 MMOL/L (ref 98–107)
CLARITY: CLEAR
CO2: 32 MMOL/L (ref 22–29)
COCAINE METABOLITE SCREEN URINE: NOT DETECTED
COLOR: YELLOW
CREAT SERPL-MCNC: 1.3 MG/DL (ref 0.7–1.2)
EOSINOPHILS ABSOLUTE: 0.06 E9/L (ref 0.05–0.5)
EOSINOPHILS RELATIVE PERCENT: 0.8 % (ref 0–6)
EPITHELIAL CELLS, UA: ABNORMAL /HPF
ETHANOL: <10 MG/DL (ref 0–0.08)
FENTANYL SCREEN, URINE: NOT DETECTED
GFR AFRICAN AMERICAN: >60
GFR NON-AFRICAN AMERICAN: >60 ML/MIN/1.73
GLUCOSE BLD-MCNC: 320 MG/DL (ref 74–99)
GLUCOSE URINE: 100 MG/DL
HCT VFR BLD CALC: 48.2 % (ref 37–54)
HEMOGLOBIN: 15.2 G/DL (ref 12.5–16.5)
IMMATURE GRANULOCYTES #: 0.04 E9/L
IMMATURE GRANULOCYTES %: 0.5 % (ref 0–5)
INFLUENZA A: NOT DETECTED
INFLUENZA B: NOT DETECTED
KETONES, URINE: NEGATIVE MG/DL
LEUKOCYTE ESTERASE, URINE: ABNORMAL
LYMPHOCYTES ABSOLUTE: 2.36 E9/L (ref 1.5–4)
LYMPHOCYTES RELATIVE PERCENT: 31.4 % (ref 20–42)
Lab: NORMAL
MCH RBC QN AUTO: 27.4 PG (ref 26–35)
MCHC RBC AUTO-ENTMCNC: 31.5 % (ref 32–34.5)
MCV RBC AUTO: 86.8 FL (ref 80–99.9)
METER GLUCOSE: 210 MG/DL (ref 74–99)
METER GLUCOSE: 295 MG/DL (ref 74–99)
METHADONE SCREEN, URINE: NOT DETECTED
MONOCYTES ABSOLUTE: 0.69 E9/L (ref 0.1–0.95)
MONOCYTES RELATIVE PERCENT: 9.2 % (ref 2–12)
NEUTROPHILS ABSOLUTE: 4.35 E9/L (ref 1.8–7.3)
NEUTROPHILS RELATIVE PERCENT: 57.8 % (ref 43–80)
NITRITE, URINE: NEGATIVE
OPIATE SCREEN URINE: NOT DETECTED
OXYCODONE URINE: NOT DETECTED
PDW BLD-RTO: 14.1 FL (ref 11.5–15)
PH UA: 6 (ref 5–9)
PHENCYCLIDINE SCREEN URINE: NOT DETECTED
PLATELET # BLD: 165 E9/L (ref 130–450)
PMV BLD AUTO: 10.5 FL (ref 7–12)
POTASSIUM SERPL-SCNC: 4.1 MMOL/L (ref 3.5–5)
PROTEIN UA: NEGATIVE MG/DL
RBC # BLD: 5.55 E12/L (ref 3.8–5.8)
RBC UA: ABNORMAL /HPF (ref 0–2)
SALICYLATE, SERUM: <0.3 MG/DL (ref 0–30)
SARS-COV-2 RNA, RT PCR: NOT DETECTED
SODIUM BLD-SCNC: 139 MMOL/L (ref 132–146)
SPECIFIC GRAVITY UA: 1.02 (ref 1–1.03)
TOTAL PROTEIN: 7.3 G/DL (ref 6.4–8.3)
TRICYCLIC ANTIDEPRESSANTS SCREEN SERUM: NEGATIVE NG/ML
UROBILINOGEN, URINE: 0.2 E.U./DL
VALPROIC ACID LEVEL: 53 MCG/ML (ref 50–100)
WBC # BLD: 7.5 E9/L (ref 4.5–11.5)
WBC UA: ABNORMAL /HPF (ref 0–5)

## 2021-06-11 PROCEDURE — 93005 ELECTROCARDIOGRAM TRACING: CPT | Performed by: EMERGENCY MEDICINE

## 2021-06-11 PROCEDURE — 96372 THER/PROPH/DIAG INJ SC/IM: CPT

## 2021-06-11 PROCEDURE — 99284 EMERGENCY DEPT VISIT MOD MDM: CPT

## 2021-06-11 PROCEDURE — 82962 GLUCOSE BLOOD TEST: CPT

## 2021-06-11 PROCEDURE — 85025 COMPLETE CBC W/AUTO DIFF WBC: CPT

## 2021-06-11 PROCEDURE — 1240000000 HC EMOTIONAL WELLNESS R&B

## 2021-06-11 PROCEDURE — 80164 ASSAY DIPROPYLACETIC ACD TOT: CPT

## 2021-06-11 PROCEDURE — 6370000000 HC RX 637 (ALT 250 FOR IP): Performed by: EMERGENCY MEDICINE

## 2021-06-11 PROCEDURE — 82140 ASSAY OF AMMONIA: CPT

## 2021-06-11 PROCEDURE — 80053 COMPREHEN METABOLIC PANEL: CPT

## 2021-06-11 PROCEDURE — 82077 ASSAY SPEC XCP UR&BREATH IA: CPT

## 2021-06-11 PROCEDURE — 80179 DRUG ASSAY SALICYLATE: CPT

## 2021-06-11 PROCEDURE — 80307 DRUG TEST PRSMV CHEM ANLYZR: CPT

## 2021-06-11 PROCEDURE — 87636 SARSCOV2 & INF A&B AMP PRB: CPT

## 2021-06-11 PROCEDURE — 81001 URINALYSIS AUTO W/SCOPE: CPT

## 2021-06-11 PROCEDURE — 80143 DRUG ASSAY ACETAMINOPHEN: CPT

## 2021-06-11 RX ORDER — GLIPIZIDE 5 MG/1
10 TABLET ORAL ONCE
Status: DISCONTINUED | OUTPATIENT
Start: 2021-06-11 | End: 2021-06-16 | Stop reason: HOSPADM

## 2021-06-11 RX ORDER — GLIPIZIDE 5 MG/1
10 TABLET ORAL
Status: DISCONTINUED | OUTPATIENT
Start: 2021-06-12 | End: 2021-06-11

## 2021-06-11 RX ORDER — INSULIN GLARGINE 100 [IU]/ML
40 INJECTION, SOLUTION SUBCUTANEOUS ONCE
Status: COMPLETED | OUTPATIENT
Start: 2021-06-11 | End: 2021-06-11

## 2021-06-11 RX ADMIN — INSULIN GLARGINE 40 UNITS: 100 INJECTION, SOLUTION SUBCUTANEOUS at 19:40
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
## 2021-06-12 LAB
EKG ATRIAL RATE: 76 BPM
EKG P AXIS: 62 DEGREES
EKG P-R INTERVAL: 178 MS
EKG Q-T INTERVAL: 394 MS
EKG QRS DURATION: 80 MS
EKG QTC CALCULATION (BAZETT): 443 MS
EKG R AXIS: 38 DEGREES
EKG T AXIS: 46 DEGREES
EKG VENTRICULAR RATE: 76 BPM
METER GLUCOSE: 180 MG/DL (ref 74–99)
METER GLUCOSE: 198 MG/DL (ref 74–99)
METER GLUCOSE: 236 MG/DL (ref 74–99)
METER GLUCOSE: 246 MG/DL (ref 74–99)

## 2021-06-12 PROCEDURE — 82962 GLUCOSE BLOOD TEST: CPT

## 2021-06-12 PROCEDURE — 90792 PSYCH DIAG EVAL W/MED SRVCS: CPT | Performed by: PSYCHIATRY & NEUROLOGY

## 2021-06-12 PROCEDURE — 6370000000 HC RX 637 (ALT 250 FOR IP): Performed by: INTERNAL MEDICINE

## 2021-06-12 PROCEDURE — 1240000000 HC EMOTIONAL WELLNESS R&B

## 2021-06-12 PROCEDURE — 6370000000 HC RX 637 (ALT 250 FOR IP): Performed by: PSYCHIATRY & NEUROLOGY

## 2021-06-12 PROCEDURE — 93010 ELECTROCARDIOGRAM REPORT: CPT | Performed by: INTERNAL MEDICINE

## 2021-06-12 RX ORDER — MAGNESIUM HYDROXIDE/ALUMINUM HYDROXICE/SIMETHICONE 120; 1200; 1200 MG/30ML; MG/30ML; MG/30ML
30 SUSPENSION ORAL PRN
Status: DISCONTINUED | OUTPATIENT
Start: 2021-06-12 | End: 2021-06-16 | Stop reason: HOSPADM

## 2021-06-12 RX ORDER — HYDROXYZINE PAMOATE 50 MG/1
50 CAPSULE ORAL 3 TIMES DAILY PRN
Status: DISCONTINUED | OUTPATIENT
Start: 2021-06-12 | End: 2021-06-16 | Stop reason: HOSPADM

## 2021-06-12 RX ORDER — PALIPERIDONE 3 MG/1
3 TABLET, EXTENDED RELEASE ORAL DAILY
Status: DISCONTINUED | OUTPATIENT
Start: 2021-06-12 | End: 2021-06-16

## 2021-06-12 RX ORDER — ALLOPURINOL 100 MG/1
100 TABLET ORAL DAILY
Status: DISCONTINUED | OUTPATIENT
Start: 2021-06-12 | End: 2021-06-16 | Stop reason: HOSPADM

## 2021-06-12 RX ORDER — FLUTICASONE PROPIONATE 50 MCG
2 SPRAY, SUSPENSION (ML) NASAL DAILY
Status: DISCONTINUED | OUTPATIENT
Start: 2021-06-12 | End: 2021-06-16 | Stop reason: HOSPADM

## 2021-06-12 RX ORDER — ACETAMINOPHEN 325 MG/1
650 TABLET ORAL EVERY 6 HOURS PRN
Status: DISCONTINUED | OUTPATIENT
Start: 2021-06-12 | End: 2021-06-16 | Stop reason: HOSPADM

## 2021-06-12 RX ORDER — GLIPIZIDE 5 MG/1
10 TABLET ORAL
Status: DISCONTINUED | OUTPATIENT
Start: 2021-06-13 | End: 2021-06-16 | Stop reason: HOSPADM

## 2021-06-12 RX ORDER — ARIPIPRAZOLE 10 MG/1
10 TABLET ORAL DAILY
Status: DISCONTINUED | OUTPATIENT
Start: 2021-06-12 | End: 2021-06-16 | Stop reason: HOSPADM

## 2021-06-12 RX ORDER — DIVALPROEX SODIUM 250 MG/1
250 TABLET, DELAYED RELEASE ORAL 2 TIMES DAILY
Status: DISCONTINUED | OUTPATIENT
Start: 2021-06-12 | End: 2021-06-16 | Stop reason: HOSPADM

## 2021-06-12 RX ORDER — INSULIN GLARGINE 100 [IU]/ML
40 INJECTION, SOLUTION SUBCUTANEOUS NIGHTLY
Status: DISCONTINUED | OUTPATIENT
Start: 2021-06-12 | End: 2021-06-16 | Stop reason: HOSPADM

## 2021-06-12 RX ORDER — PAROXETINE 10 MG/1
10 TABLET, FILM COATED ORAL DAILY
Status: CANCELLED | OUTPATIENT
Start: 2021-06-12

## 2021-06-12 RX ORDER — HALOPERIDOL 5 MG/ML
5 INJECTION INTRAMUSCULAR EVERY 6 HOURS PRN
Status: DISCONTINUED | OUTPATIENT
Start: 2021-06-12 | End: 2021-06-16 | Stop reason: HOSPADM

## 2021-06-12 RX ORDER — SPIRONOLACTONE 25 MG/1
25 TABLET ORAL DAILY
Status: DISCONTINUED | OUTPATIENT
Start: 2021-06-12 | End: 2021-06-16 | Stop reason: HOSPADM

## 2021-06-12 RX ORDER — PAROXETINE 10 MG/1
10 TABLET, FILM COATED ORAL DAILY
Status: DISCONTINUED | OUTPATIENT
Start: 2021-06-12 | End: 2021-06-16 | Stop reason: HOSPADM

## 2021-06-12 RX ORDER — PANTOPRAZOLE SODIUM 40 MG/1
40 TABLET, DELAYED RELEASE ORAL
Refills: 3 | Status: DISCONTINUED | OUTPATIENT
Start: 2021-06-13 | End: 2021-06-16 | Stop reason: HOSPADM

## 2021-06-12 RX ORDER — FUROSEMIDE 40 MG/1
40 TABLET ORAL DAILY
Status: DISCONTINUED | OUTPATIENT
Start: 2021-06-12 | End: 2021-06-16 | Stop reason: HOSPADM

## 2021-06-12 RX ORDER — HALOPERIDOL 5 MG
5 TABLET ORAL EVERY 6 HOURS PRN
Status: DISCONTINUED | OUTPATIENT
Start: 2021-06-12 | End: 2021-06-16 | Stop reason: HOSPADM

## 2021-06-12 RX ADMIN — ARIPIPRAZOLE 10 MG: 10 TABLET ORAL at 13:59

## 2021-06-12 RX ADMIN — PAROXETINE 10 MG: 10 TABLET, FILM COATED ORAL at 13:59

## 2021-06-12 RX ADMIN — FLUTICASONE PROPIONATE 2 SPRAY: 50 SPRAY, METERED NASAL at 15:41

## 2021-06-12 RX ADMIN — SPIRONOLACTONE 25 MG: 25 TABLET ORAL at 15:41

## 2021-06-12 RX ADMIN — PALIPERIDONE 3 MG: 3 TABLET, EXTENDED RELEASE ORAL at 13:59

## 2021-06-12 RX ADMIN — DIVALPROEX SODIUM 250 MG: 250 TABLET, DELAYED RELEASE ORAL at 13:59

## 2021-06-12 RX ADMIN — FUROSEMIDE 40 MG: 40 TABLET ORAL at 15:41

## 2021-06-12 RX ADMIN — INSULIN LISPRO 2 UNITS: 100 INJECTION, SOLUTION INTRAVENOUS; SUBCUTANEOUS at 16:15

## 2021-06-12 RX ADMIN — INSULIN GLARGINE 40 UNITS: 100 INJECTION, SOLUTION SUBCUTANEOUS at 22:52

## 2021-06-12 RX ADMIN — ALLOPURINOL 100 MG: 100 TABLET ORAL at 15:41

## 2021-06-12 RX ADMIN — DIVALPROEX SODIUM 250 MG: 250 TABLET, DELAYED RELEASE ORAL at 22:49

## 2021-06-12 ASSESSMENT — SLEEP AND FATIGUE QUESTIONNAIRES
DO YOU HAVE DIFFICULTY SLEEPING: NO
DIFFICULTY STAYING ASLEEP: YES
DIFFICULTY FALLING ASLEEP: YES
DIFFICULTY ARISING: NO
DO YOU HAVE DIFFICULTY SLEEPING: YES
DO YOU USE A SLEEP AID: NO
RESTFUL SLEEP: NO
DO YOU USE A SLEEP AID: NO
AVERAGE NUMBER OF SLEEP HOURS: 7
SLEEP PATTERN: DIFFICULTY FALLING ASLEEP
AVERAGE NUMBER OF SLEEP HOURS: 4

## 2021-06-12 ASSESSMENT — LIFESTYLE VARIABLES
HISTORY_ALCOHOL_USE: NO
HISTORY_ALCOHOL_USE: YES

## 2021-06-12 NOTE — PLAN OF CARE
Pt is stable and without distress at this time. Pt denies suicidal or homicidal ideations. Pt denies hallucinations. Pt has a flat, suspicious affect. Pt is cooperative though quiet and isolative. Pt does not express other concerns at this time, does not have concerns to discuss. Pt reports goal, \"To have a positive outlook\" pr pt. Will follow monitor.

## 2021-06-12 NOTE — CARE COORDINATION
Biopsychosocial Assessment Note    Social work met with patient to complete the biopsychosocial assessment and CSSR-S. Mental Status Exam: pt alert&oriented x4. Pt cooperative, withdrawn at times. Pt mood depressed, anxious, flat affect. Pt eye contact fair, speech normal. Pt insight/judgement poor. Pt denies SI/HI/AVH    Chief Complaint: \"Pt is a 42 yo male who presents as a walk-in, pt is on a pink slip, pt reports an increase in symptoms including anger outbursts and auditory hallucinations. States his mother(whom he lives with) \". .. sent me here because I need to get my mind straight. \" Reports these voices tell him to harm others and at times himself, in past he has acted upon by the voices. Pt reports this is not occurring at the present time. When asked if he has homicidal ideation states: \"No I guess not. .. not really\"  Pt declined to clarify. \"    Patient Report: pt reports coming in for anger issues due to arguments with his mom. Pt denied hx of AVH despite ED note stated pt reported AH telling him to hurt himself and others. Pt reports psych admission hx, last admission at this facility 1/23/21. Pt reports hx of 3 suicide attempts, does not know when last attempt was. Pt reports having SI when he is feeling low which reports \"happens once or twice a year. .. randomly happens. \" Pt denies self injurious behaviors. Pt reported past hx of substance, could not state anything other than \"drugs and alcohol\". Pt reports treatment hx, could not identify where or when. Pt denies trauma hx.       Gender  [x] Male [] Female [] Transgender  [] Other    Sexual Orientation    [x] Heterosexual [] Homosexual [] Bisexual [] Other    Suicidal Ideation  [x] Past [] Present [] Denies     Homicidal Ideation  [] Past [] Present [x] Denies     Hallucinations/Delusions (Specify type)  [] Reports [x] Denies     Substance Use/Alcohol Use/Addiction past, could not identify  [x] Reports [] Denies     Tobacco Use (within the last 6 months)  [x] Reports [] Denies     Trauma History  [] Reports [x] Denies     Collateral Contact (NATHANIEL signed)  Name: Thierry Prather  Relationship: mother  Number: [phone]    Collateral Information: Sw attempted to contact pt mom for collateral. No answer, voicemail left.        Access to Weapons per Collateral Contact: [] Reports [] Denies       Follow up provider preference: Brent Foreman for discharge  Location (where do they plan on discharging to?): home where lives alone    Transportation (who will pick them up at discharge?) car is here    Medications (will they have money for copays at discharge?): insurance

## 2021-06-12 NOTE — PLAN OF CARE
Problem: Anger Management/Homicidal Ideation:  Goal: Ability to verbalize frustrations and anger appropriately will improve  Description: Ability to verbalize frustrations and anger appropriately will improve  6/12/2021 1855 by Nan Andrews RN  Outcome: Ongoing  6/12/2021 0915 by Angelica Puente RN  Outcome: Ongoing  6/12/2021 0640 by Judson Carrel, RN  Outcome: Ongoing  Goal: Absence of angry outbursts  Description: Absence of angry outbursts  6/12/2021 1855 by Nan Andrews RN  Outcome: Ongoing  6/12/2021 0640 by Judson Carrel, RN  Outcome: Ongoing     Problem: Depressive Behavior With or Without Suicide Precautions:  Goal: Able to verbalize and/or display a decrease in depressive symptoms  Description: Able to verbalize and/or display a decrease in depressive symptoms  6/12/2021 1855 by Nan Andrews RN  Outcome: Ongoing  6/12/2021 0915 by Angelica Puente RN  Outcome: Ongoing  6/12/2021 0640 by Judson Carrel, RN  Outcome: Ongoing  Goal: Absence of self-harm  Description: Absence of self-harm  6/12/2021 1855 by Nan Andrews RN  Outcome: Ongoing  6/12/2021 0640 by Judson Carrel, RN  Outcome: Ongoing     Problem: Altered Mood, Psychotic Behavior:  Goal: Able to verbalize reality based thinking  Description: Able to verbalize reality based thinking  6/12/2021 1855 by Nan Andrews RN  Outcome: Ongoing  6/12/2021 0640 by Judson Carrel, RN  Outcome: Ongoing  Goal: Absence of self-harm  Description: Absence of self-harm  6/12/2021 1855 by Nan Andrews RN  Outcome: Ongoing  6/12/2021 0640 by Judson Carrel, RN  Outcome: Ongoing
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
## 2021-06-12 NOTE — ED NOTES
Emergency Department [facility] AN AFFILIATE [facility] Biopsychosocial Assessment Note    Chief Complaint:  Pt is a 40 yo male who presents as a walk-in, pt is on a pink slip, pt reports an increase in symptoms including anger outbursts and auditory hallucinations. States his mother(whom he lives with) \". .. sent me here because I need to get my mind straight. \" Reports these voices tell him to harm others and at times himself, in past he has acted upon by the voices. Pt reports this is not occurring at the present time. When asked if he has homicidal ideation states: \"No I guess not. .. not really\"  Pt declined to clarify. MSE: Alert, oriented x4, mood neutral, affect flat, eye contact poor, behavior calm, cooperative, appropriate, no signs of agitation, thought form shows some disorganization, thought content some paranoia, reports auditory hallucinations, states command hallucinations at times, overall mental status remarkable for psychosis. Clinical Summary/History: Reports open at [facility], dx: schizophrenia, hx of Abilify, Depakote, Paxil, Invega tablets, Aristada injection, [facility], last was 1/23/2021. Gender  [x] Male [] Female [] Transgender  [] Other    Sexual Orientation    [x] Heterosexual [] Homosexual [] Bisexual [] Other    Suicidal Behavioral: CSSR-S Complete. [x] Reports:    [] Past [] Present   [] Denies    Homicidal/ Violent Behavior  [x] Reports:   [] Past [] Present   [] Denies     Hallucinations/Delusions   [x] Reports:   [] Denies     Substance Use/Alcohol Use/Addiction: SBIRT Screen Complete.    [] Reports:   [x] Denies     Trauma History  [] Reports:  [] Denies     Collateral Information:       Level of Care/Disposition Plan: Discuss with Dr Nely Moore, pt meets in-patient criteria, referred to [facility] nurse Paras Fonseca for discussion with on-call re: admission [facility].   [] Home:   [] Outpatient Provider:   [] Crisis Unit:   [] Inpatient Psychiatric Unit:  [] Other:        [address], MSW, LSW  06/11/21 7105
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
## 2021-06-13 LAB
METER GLUCOSE: 145 MG/DL (ref 74–99)
METER GLUCOSE: 148 MG/DL (ref 74–99)
METER GLUCOSE: 151 MG/DL (ref 74–99)
METER GLUCOSE: 205 MG/DL (ref 74–99)

## 2021-06-13 PROCEDURE — 6370000000 HC RX 637 (ALT 250 FOR IP): Performed by: INTERNAL MEDICINE

## 2021-06-13 PROCEDURE — 6370000000 HC RX 637 (ALT 250 FOR IP): Performed by: PSYCHIATRY & NEUROLOGY

## 2021-06-13 PROCEDURE — 6370000000 HC RX 637 (ALT 250 FOR IP): Performed by: NURSE PRACTITIONER

## 2021-06-13 PROCEDURE — 1240000000 HC EMOTIONAL WELLNESS R&B

## 2021-06-13 PROCEDURE — 99231 SBSQ HOSP IP/OBS SF/LOW 25: CPT | Performed by: NURSE PRACTITIONER

## 2021-06-13 PROCEDURE — 82962 GLUCOSE BLOOD TEST: CPT

## 2021-06-13 RX ORDER — NYSTATIN 100000 U/G
CREAM TOPICAL 2 TIMES DAILY
Status: DISCONTINUED | OUTPATIENT
Start: 2021-06-13 | End: 2021-06-16 | Stop reason: HOSPADM

## 2021-06-13 RX ORDER — NYSTATIN 100000 U/G
CREAM TOPICAL 2 TIMES DAILY
Status: DISCONTINUED | OUTPATIENT
Start: 2021-06-13 | End: 2021-06-13

## 2021-06-13 RX ORDER — POLYETHYLENE GLYCOL 3350 17 G
2 POWDER IN PACKET (EA) ORAL PRN
Status: DISCONTINUED | OUTPATIENT
Start: 2021-06-13 | End: 2021-06-16 | Stop reason: HOSPADM

## 2021-06-13 RX ADMIN — INSULIN LISPRO 1 UNITS: 100 INJECTION, SOLUTION INTRAVENOUS; SUBCUTANEOUS at 08:51

## 2021-06-13 RX ADMIN — NYSTATIN: 100000 CREAM TOPICAL at 16:45

## 2021-06-13 RX ADMIN — INSULIN LISPRO 1 UNITS: 100 INJECTION, SOLUTION INTRAVENOUS; SUBCUTANEOUS at 12:05

## 2021-06-13 RX ADMIN — SPIRONOLACTONE 25 MG: 25 TABLET ORAL at 08:53

## 2021-06-13 RX ADMIN — DIVALPROEX SODIUM 250 MG: 250 TABLET, DELAYED RELEASE ORAL at 08:53

## 2021-06-13 RX ADMIN — FLUTICASONE PROPIONATE 2 SPRAY: 50 SPRAY, METERED NASAL at 08:56

## 2021-06-13 RX ADMIN — PAROXETINE 10 MG: 10 TABLET, FILM COATED ORAL at 08:53

## 2021-06-13 RX ADMIN — ARIPIPRAZOLE 10 MG: 10 TABLET ORAL at 08:53

## 2021-06-13 RX ADMIN — ACETAMINOPHEN 650 MG: 325 TABLET ORAL at 04:23

## 2021-06-13 RX ADMIN — PALIPERIDONE 3 MG: 3 TABLET, EXTENDED RELEASE ORAL at 08:53

## 2021-06-13 RX ADMIN — PANTOPRAZOLE SODIUM 40 MG: 40 TABLET, DELAYED RELEASE ORAL at 06:22

## 2021-06-13 RX ADMIN — DIVALPROEX SODIUM 250 MG: 250 TABLET, DELAYED RELEASE ORAL at 22:15

## 2021-06-13 RX ADMIN — FUROSEMIDE 40 MG: 40 TABLET ORAL at 08:53

## 2021-06-13 RX ADMIN — ALLOPURINOL 100 MG: 100 TABLET ORAL at 08:53

## 2021-06-13 RX ADMIN — NYSTATIN: 100000 CREAM TOPICAL at 22:20

## 2021-06-13 RX ADMIN — GLIPIZIDE 10 MG: 5 TABLET ORAL at 06:22

## 2021-06-13 RX ADMIN — INSULIN LISPRO 1 UNITS: 100 INJECTION, SOLUTION INTRAVENOUS; SUBCUTANEOUS at 16:25

## 2021-06-13 RX ADMIN — INSULIN GLARGINE 40 UNITS: 100 INJECTION, SOLUTION SUBCUTANEOUS at 22:17

## 2021-06-13 ASSESSMENT — PAIN SCALES - GENERAL
PAINLEVEL_OUTOF10: 0
PAINLEVEL_OUTOF10: 8
PAINLEVEL_OUTOF10: 0

## 2021-06-13 NOTE — PLAN OF CARE
Pt is stable and without immediate distress. Pt denies suicidal or homicidal ideations. Pt reports hearing voices in the background, denies commands. Pt is cooperative, poverty of content in speech. Flat affect. Pt reports goal, \"To have a positive outlook\" pr pt. Will follow and monitor.
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
## 2021-06-14 LAB
CHOLESTEROL, TOTAL: 203 MG/DL (ref 0–199)
HBA1C MFR BLD: 9.8 % (ref 4–5.6)
HDLC SERPL-MCNC: 41 MG/DL
LDL CHOLESTEROL CALCULATED: 133 MG/DL (ref 0–99)
METER GLUCOSE: 136 MG/DL (ref 74–99)
METER GLUCOSE: 142 MG/DL (ref 74–99)
METER GLUCOSE: 153 MG/DL (ref 74–99)
METER GLUCOSE: 159 MG/DL (ref 74–99)
TRIGL SERPL-MCNC: 143 MG/DL (ref 0–149)
VLDLC SERPL CALC-MCNC: 29 MG/DL

## 2021-06-14 PROCEDURE — 36415 COLL VENOUS BLD VENIPUNCTURE: CPT

## 2021-06-14 PROCEDURE — 82962 GLUCOSE BLOOD TEST: CPT

## 2021-06-14 PROCEDURE — 80061 LIPID PANEL: CPT

## 2021-06-14 PROCEDURE — 83036 HEMOGLOBIN GLYCOSYLATED A1C: CPT

## 2021-06-14 PROCEDURE — 6370000000 HC RX 637 (ALT 250 FOR IP): Performed by: INTERNAL MEDICINE

## 2021-06-14 PROCEDURE — 6370000000 HC RX 637 (ALT 250 FOR IP): Performed by: PSYCHIATRY & NEUROLOGY

## 2021-06-14 PROCEDURE — 99232 SBSQ HOSP IP/OBS MODERATE 35: CPT | Performed by: NURSE PRACTITIONER

## 2021-06-14 PROCEDURE — 1240000000 HC EMOTIONAL WELLNESS R&B

## 2021-06-14 RX ADMIN — DIVALPROEX SODIUM 250 MG: 250 TABLET, DELAYED RELEASE ORAL at 20:50

## 2021-06-14 RX ADMIN — ALLOPURINOL 100 MG: 100 TABLET ORAL at 08:48

## 2021-06-14 RX ADMIN — FUROSEMIDE 40 MG: 40 TABLET ORAL at 08:48

## 2021-06-14 RX ADMIN — DIVALPROEX SODIUM 250 MG: 250 TABLET, DELAYED RELEASE ORAL at 08:48

## 2021-06-14 RX ADMIN — SPIRONOLACTONE 25 MG: 25 TABLET ORAL at 08:48

## 2021-06-14 RX ADMIN — GLIPIZIDE 10 MG: 5 TABLET ORAL at 06:42

## 2021-06-14 RX ADMIN — INSULIN GLARGINE 40 UNITS: 100 INJECTION, SOLUTION SUBCUTANEOUS at 20:50

## 2021-06-14 RX ADMIN — ARIPIPRAZOLE 10 MG: 10 TABLET ORAL at 08:47

## 2021-06-14 RX ADMIN — INSULIN LISPRO 1 UNITS: 100 INJECTION, SOLUTION INTRAVENOUS; SUBCUTANEOUS at 16:21

## 2021-06-14 RX ADMIN — PAROXETINE 10 MG: 10 TABLET, FILM COATED ORAL at 08:48

## 2021-06-14 RX ADMIN — PALIPERIDONE 3 MG: 3 TABLET, EXTENDED RELEASE ORAL at 08:48

## 2021-06-14 RX ADMIN — PANTOPRAZOLE SODIUM 40 MG: 40 TABLET, DELAYED RELEASE ORAL at 06:41

## 2021-06-14 RX ADMIN — INSULIN LISPRO 1 UNITS: 100 INJECTION, SOLUTION INTRAVENOUS; SUBCUTANEOUS at 09:51

## 2021-06-14 RX ADMIN — NYSTATIN: 100000 CREAM TOPICAL at 20:50

## 2021-06-14 ASSESSMENT — PAIN SCALES - GENERAL
PAINLEVEL_OUTOF10: 0
PAINLEVEL_OUTOF10: 0

## 2021-06-14 NOTE — CONSULTS
[address]                  [address] [facility] [place],  [facility]                                  CONSULTATION    PATIENT NAME: Birgit Infante                     :        1980  MED REC NO:   [id number]                            ROOM:       7521  ACCOUNT NO:   [de-identified]                           ADMIT DATE: 2021  PROVIDER:     Yocasta Guillermo MD    CONSULT DATE:  2021    REASON FOR CONSULTATION:  Medical management of diabetes, hypertension,  etc.    HISTORY OF PRESENT ILLNESS:  The patient is a 53-year-old   American male who has a longstanding psychiatric issues who was admitted  to Psychiatry through the emergency room for schizoaffective disorder  bipolar type and exacerbation. The patient, from my standpoint, has a  history of nonischemic dilated cardiomyopathy, diabetes type 2, obesity,  history of implantable defibrillator, obstructive sleep apnea, GERD,  hiatal hernia, tobacco abuse. The patient has not been compliant with  office visits and followup. He is not sure totally what his meds are. Nevertheless, he does admit to taking his insulin. FAMILY HISTORY:  Noncontributory. MEDICATIONS:  Zyloprim 100 daily, Lasix 40 daily, Glucotrol 10 daily,  Lantus 40 daily with sliding scale Humalog, Protonix 40 daily, Aldactone  25 daily and psychiatric meds unclear what he was taking. PHYSICAL EXAMINATION:  GENERAL:  Awake, alert, no distress. HEENT:  Normocephalic, atraumatic. NECK:  Supple. LUNGS:  Clear. HEART:  Regular rate and rhythm. ABDOMEN:  Soft, obese, positive bowel sounds. EXTREMITIES:  No cyanosis, clubbing. Trace edema. ASSESSMENT:  1.  Schizoaffective bipolar type with exacerbation. 2.  Hypertension. 3.  Nonischemic cardiomyopathy, dilated. 4.  Type 2 diabetes. 5.  Obstructive sleep apnea. 6.  Obesity. PLAN:  Resume orders as written, from my point of view the medical  orders.   Follow blood sugar as per orders. Stable medically at this  point.         Asim Elias MD    D: 06/13/2021 15:24:41       T: 06/13/2021 15:28:07     [email or web address]  Job#: [id number]     Doc#: [id number]    CC:

## 2021-06-14 NOTE — GROUP NOTE
Group Therapy Note    Date: 6/14/2021    Group Start Time: 1110  Group End Time: 8139  Group Topic: Cognitive Skills    [facility]    D[address]NE [address]        Group Therapy Note    Attendees: 12         Patient's Goal:  Pt will be able to verbalize and understand how to manage their stress in a positive way. Notes: Pt participated in group and made connections. Status After Intervention:  Improved    Participation Level:  Active Listener and Interactive    Participation Quality: Appropriate and Attentive      Speech:  normal      Thought Process/Content: Logical  Linear      Affective Functioning: Congruent      Mood: depressed      Level of consciousness:  Alert and Oriented x4      Response to Learning: Able to verbalize current knowledge/experience and Able to verbalize/acknowledge new learning      Endings: None Reported    Modes of Intervention: Education, Support, Socialization, Exploration and Clarifying      Discipline Responsible: /Counselor      Signature:  DAPHNE [address]ke[address]

## 2021-06-14 NOTE — PROGRESS NOTES
This nurse was passing medication when Larry Fuentes said, \"I had to kill her\", this nurse asked what he said and he answered, \"nevermind\".

## 2021-06-14 NOTE — CARE COORDINATION
THIAGO called LINDA to determine pt's next appointment. Yrn Kraft stated that the pt has an appointment scheduled for July 6th at 8:50 AM to receive an injection but he does not have an upcoming appointment with the provider. Shameka informed THIAGO to [facility] access when the pt is being released to set up future appointments. \"

## 2021-06-14 NOTE — PLAN OF CARE
7-11pm shift. Pt states is having a \"better day\". Denied having any concerns. Has been up to milieu briefly on and off. Appears flat, sad , depressed with underlying irritability and anxiety, but denied depression and anxiety. Denied suicidal/homicidal ideations and hallucinations, but appeared preoccupied and suspicious. Has poor eye contact. Answers are very brief, poverty of content. Remains in control of his behavior. Encouraged pt to remain in milieu.   Remains on close observation
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
## 2021-06-14 NOTE — PROGRESS NOTES
Use    Smoking status: Current Every Day Smoker     Packs/day: 1.00     Years: 3.00     Pack years: 3.00     Types: Cigars    Smokeless tobacco: Never Used   Vaping Use    Vaping Use: Never used   Substance and Sexual Activity    Alcohol use: Yes     Alcohol/week: 1.0 standard drinks     Types: 1 Cans of beer per week     Comment: occ beer    Drug use: No     Types: Marijuana     Comment: past use of marijuana;  none since 1999    Sexual activity: Not on file   Other Topics Concern    Not on file   Social History Narrative    Drinks 1 cup of coffee daily and occ energy drink     Social Determinants of Health     Financial Resource Strain:     Difficulty of Paying Living Expenses:    Food Insecurity:     Worried About Running Out of Food in the Last Year:     Ran Out of Food in the Last Year:    Transportation Needs:     Lack of Transportation (Medical):  Lack of Transportation (Non-Medical):    Physical Activity:     Days of Exercise per Week:     Minutes of Exercise per Session:    Stress:     Feeling of Stress :    Social Connections:     Frequency of Communication with Friends and Family:     Frequency of Social Gatherings with Friends and Family:     Attends Mosque Services:     Active Member of Clubs or Organizations:     Attends Club or Organization Meetings:     Marital Status:    Intimate Partner Violence:     Fear of Current or Ex-Partner:     Emotionally Abused:     Physically Abused:     Sexually Abused:            ROS:  [x] All negative/unchanged except if checked.  Explain positive(checked items) below:  [] Constitutional  [] Eyes  [] Ear/Nose/Mouth/Throat  [] Respiratory  [] CV  [] GI  []   [] Musculoskeletal  [] Skin/Breast  [] Neurological  [] Endocrine  [] Heme/Lymph  [] Allergic/Immunologic    Explanation:     MEDICATIONS:    Current Facility-Administered Medications:     nicotine polacrilex (COMMIT) lozenge 2 mg, 2 mg, Oral, PRN, Shari Watson, APRN - CNP   nystatin (MYCOSTATIN) cream, , Topical, BID, Kisha Wakefield, APRN - CNP, Given at 06/13/21 2220    acetaminophen (TYLENOL) tablet 650 mg, 650 mg, Oral, Q6H PRN, Giancarlo Mendez MD, 650 mg at 06/13/21 0423    magnesium hydroxide (MILK OF MAGNESIA) 400 MG/5ML suspension 30 mL, 30 mL, Oral, Daily PRN, Giancarlo Mendez MD    aluminum & magnesium hydroxide-simethicone (MAALOX) 200-200-20 MG/5ML suspension 30 mL, 30 mL, Oral, PRN, Giancarlo Mendez MD    hydrOXYzine (VISTARIL) capsule 50 mg, 50 mg, Oral, TID PRN, Giancarlo Mendez MD    haloperidol (HALDOL) tablet 5 mg, 5 mg, Oral, Q6H PRN **OR** haloperidol lactate (HALDOL) injection 5 mg, 5 mg, Intramuscular, Q6H PRN, Giancarlo Mendez MD    paliperidone (INVEGA) extended release tablet 3 mg, 3 mg, Oral, Daily, Zack Harper MD, 3 mg at 06/14/21 0848    divalproex (DEPAKOTE) DR tablet 250 mg, 250 mg, Oral, BID, Zack Harper MD, 250 mg at 06/14/21 0848    ARIPiprazole (ABILIFY) tablet 10 mg, 10 mg, Oral, Daily, Zack Harper MD, 10 mg at 06/14/21 0847    PARoxetine (PAXIL) tablet 10 mg, 10 mg, Oral, Daily, Zack Harper MD, 10 mg at 06/14/21 0848    allopurinol (ZYLOPRIM) tablet 100 mg, 100 mg, Oral, Daily, Magdalena Rollins MD, 100 mg at 06/14/21 0848    pantoprazole (PROTONIX) tablet 40 mg, 40 mg, Oral, QAM AC, Kingsley Del Valle MD, 40 mg at 06/14/21 0641    fluticasone (FLONASE) 50 MCG/ACT nasal spray 2 spray, 2 spray, Each Nostril, Daily, Magdalena Rollins MD, 2 spray at 06/13/21 0856    furosemide (LASIX) tablet 40 mg, 40 mg, Oral, Daily, Kingsley Del Valle MD, 40 mg at 06/14/21 0848    glipiZIDE (GLUCOTROL) tablet 10 mg, 10 mg, Oral, QAM AC, Kingsley Del Valle MD, 10 mg at 06/14/21 0825    spironolactone (ALDACTONE) tablet 25 mg, 25 mg, Oral, Daily, Kingsley Del Valle MD, 25 mg at 06/14/21 0848    insulin glargine (LANTUS) injection vial 40 Units, 40 Units, Subcutaneous, Nightly, Magdalena Rollins MD, 40 Units at

## 2021-06-14 NOTE — PLAN OF CARE
Problem: Anger Management/Homicidal Ideation:  Goal: Ability to verbalize frustrations and anger appropriately will improve  Description: Ability to verbalize frustrations and anger appropriately will improve  6/14/2021 0801 by Debbie Magaña RN  Outcome: Ongoing  6/14/2021 0330 by Champ Rg RN  Outcome: Ongoing  6/13/2021 2144 by Champ gR RN  Outcome: Ongoing  Goal: Absence of angry outbursts  Description: Absence of angry outbursts  6/14/2021 0801 by Debbie Magaña RN  Outcome: Ongoing  6/14/2021 0330 by Champ Rg RN  Outcome: Ongoing  6/13/2021 2144 by Champ Rg RN  Outcome: Met This Shift     Problem: Depressive Behavior With or Without Suicide Precautions:  Goal: Able to verbalize and/or display a decrease in depressive symptoms  Description: Able to verbalize and/or display a decrease in depressive symptoms  6/14/2021 0801 by Debbie Magaña RN  Outcome: Ongoing  6/14/2021 0330 by Champ Rg RN  Outcome: Ongoing  6/13/2021 2144 by Champ Rg RN  Outcome: Ongoing  Goal: Absence of self-harm  Description: Absence of self-harm  6/14/2021 0801 by Debbie Magaña RN  Outcome: Ongoing  6/14/2021 0330 by Champ Rg RN  Outcome: Met This Shift  6/13/2021 2144 by Champ Rg RN  Outcome: Met This Shift
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
## 2021-06-14 NOTE — BH NOTE
Patient reports auditory hallucinations \"that tell me to get out\". Patient is guarded and preoccupied with poor eye contact. Patient denies suicidal ideation, denies homicidal ideation. Patient isolates to his room, is cooperative with medications and unit routine.  Emotional support given

## 2021-06-15 LAB
METER GLUCOSE: 112 MG/DL (ref 74–99)
METER GLUCOSE: 117 MG/DL (ref 74–99)
METER GLUCOSE: 148 MG/DL (ref 74–99)

## 2021-06-15 PROCEDURE — 1240000000 HC EMOTIONAL WELLNESS R&B

## 2021-06-15 PROCEDURE — 6370000000 HC RX 637 (ALT 250 FOR IP): Performed by: INTERNAL MEDICINE

## 2021-06-15 PROCEDURE — 6370000000 HC RX 637 (ALT 250 FOR IP): Performed by: PSYCHIATRY & NEUROLOGY

## 2021-06-15 PROCEDURE — 99232 SBSQ HOSP IP/OBS MODERATE 35: CPT | Performed by: NURSE PRACTITIONER

## 2021-06-15 PROCEDURE — 82962 GLUCOSE BLOOD TEST: CPT

## 2021-06-15 RX ADMIN — GLIPIZIDE 10 MG: 5 TABLET ORAL at 06:25

## 2021-06-15 RX ADMIN — PALIPERIDONE 3 MG: 3 TABLET, EXTENDED RELEASE ORAL at 09:24

## 2021-06-15 RX ADMIN — DIVALPROEX SODIUM 250 MG: 250 TABLET, DELAYED RELEASE ORAL at 21:07

## 2021-06-15 RX ADMIN — INSULIN GLARGINE 40 UNITS: 100 INJECTION, SOLUTION SUBCUTANEOUS at 21:05

## 2021-06-15 RX ADMIN — ARIPIPRAZOLE 10 MG: 10 TABLET ORAL at 09:24

## 2021-06-15 RX ADMIN — PAROXETINE 10 MG: 10 TABLET, FILM COATED ORAL at 09:23

## 2021-06-15 RX ADMIN — DIVALPROEX SODIUM 250 MG: 250 TABLET, DELAYED RELEASE ORAL at 09:24

## 2021-06-15 RX ADMIN — PANTOPRAZOLE SODIUM 40 MG: 40 TABLET, DELAYED RELEASE ORAL at 06:25

## 2021-06-15 RX ADMIN — SPIRONOLACTONE 25 MG: 25 TABLET ORAL at 09:24

## 2021-06-15 RX ADMIN — ALLOPURINOL 100 MG: 100 TABLET ORAL at 09:23

## 2021-06-15 RX ADMIN — INSULIN LISPRO 1 UNITS: 100 INJECTION, SOLUTION INTRAVENOUS; SUBCUTANEOUS at 17:04

## 2021-06-15 RX ADMIN — NYSTATIN: 100000 CREAM TOPICAL at 21:08

## 2021-06-15 RX ADMIN — FUROSEMIDE 40 MG: 40 TABLET ORAL at 09:23

## 2021-06-15 ASSESSMENT — PAIN SCALES - GENERAL: PAINLEVEL_OUTOF10: 0

## 2021-06-15 NOTE — PLAN OF CARE
Problem: Anger Management/Homicidal Ideation:  Goal: Absence of angry outbursts  Description: Absence of angry outbursts  Outcome: Ongoing     Problem: Altered Mood, Psychotic Behavior:  Goal: Able to verbalize reality based thinking  Description: Able to verbalize reality based thinking  Outcome: Ongoing              Patient out on milieu. Patient has better eye contact. Watchful and suspicious. Patient admits to auditory hallucinations s,\"saying things\". He does report they are non command. Denies suicidal and homicidal thoughts. Attending groups and medication compliant.

## 2021-06-15 NOTE — GROUP NOTE
Group Therapy Note    Date: 6/15/2021    Group Start Time: 1100  Group End Time: 1140  Group Topic: Cognitive Skills    [facility]    YESICA Aragon LSW        Group Therapy Note    Attendees: 13         Patient's  Goal: To understand  Identify  Cognitive Distortions    Notes:  Pt was observed to be attentive throughout group discussion. Status After Intervention:  Improved    Participation Level:  Active Listener    Participation Quality: Attentive      Speech:  normal      Thought Process/Content: Logical      Affective Functioning: Congruent      Mood: depressed      Level of consciousness:  Alert, Oriented x4 and Attentive      Response to Learning: Capable of insight      Endings: None Reported    Modes of Intervention: Education      Discipline Responsible: /Counselor      Signature:  YESICA Aragon LSW
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
## 2021-06-15 NOTE — PLAN OF CARE
Patient denies suicidal ideation, homicidal ideations and AVH. Patient denies depression but rates anxiety 6 out of 10. Patient flat, depressed, suspicious with poor eye contact. Presents calm and cooperative during assessment. Patient is out on the unit but does not appear to be social with peers. Medications taken without issue. No complaints or concerns verbalized at this time. No unit problems reported. Will continue to observe and support.     Problem: Anger Management/Homicidal Ideation:  Goal: Ability to verbalize frustrations and anger appropriately will improve  Description: Ability to verbalize frustrations and anger appropriately will improve  6/14/2021 2051 by Will Wen RN  Outcome: Ongoing     Problem: Anger Management/Homicidal Ideation:  Goal: Absence of angry outbursts  Description: Absence of angry outbursts  6/14/2021 2051 by Will Wen RN  Outcome: Ongoing     Problem: Depressive Behavior With or Without Suicide Precautions:  Goal: Able to verbalize and/or display a decrease in depressive symptoms  Description: Able to verbalize and/or display a decrease in depressive symptoms  6/14/2021 2051 by Will Wen RN  Outcome: Ongoing     Problem: Depressive Behavior With or Without Suicide Precautions:  Goal: Absence of self-harm  Description: Absence of self-harm  6/14/2021 2051 by Will Wen RN  Outcome: Ongoing

## 2021-06-15 NOTE — GROUP NOTE
Group Therapy Note    Date: 6/15/2021    Group Start Time: 1000  Group End Time: 6968  Group Topic: Psychoeducation    [facility]    Shilpa Melendez, CTRS        Group Therapy Note      Number of participants: 13  Type of group: Psychoeducation  Mode of intervention: Education, Support, Socialization, Exploration, Clarifying, and Problem-solving  Topic: Sleep Hygiene   Objective: Pt will identify 1 way to improve their sleep routine in recovery. Patient's Goal:  \"To relax\"     Notes: Pt interactive during group sharing 1 way to improve sleep routine. Pt gave support and feedback to others. Status After Intervention:  Improved    Participation Level:  Active Listener and Interactive    Participation Quality: Appropriate, Attentive, Sharing and Supportive      Speech:  normal      Thought Process/Content: Logical      Affective Functioning: Congruent      Mood: euthymic      Level of consciousness:  Alert, Oriented x4 and Attentive      Response to Learning: Able to verbalize current knowledge/experience, Able to verbalize/acknowledge new learning, Able to retain information, Capable of insight, Able to change behavior and Progressing to goal      Endings: None Reported    Modes of Intervention: Education, Support, Socialization, Exploration, Clarifying and Problem-solving

## 2021-06-16 VITALS
DIASTOLIC BLOOD PRESSURE: 80 MMHG | HEIGHT: 69 IN | TEMPERATURE: 98.1 F | BODY MASS INDEX: 40.73 KG/M2 | RESPIRATION RATE: 16 BRPM | OXYGEN SATURATION: 99 % | SYSTOLIC BLOOD PRESSURE: 144 MMHG | WEIGHT: 275 LBS | HEART RATE: 73 BPM

## 2021-06-16 LAB
METER GLUCOSE: 102 MG/DL (ref 74–99)
METER GLUCOSE: 96 MG/DL (ref 74–99)

## 2021-06-16 PROCEDURE — 82962 GLUCOSE BLOOD TEST: CPT

## 2021-06-16 PROCEDURE — 6370000000 HC RX 637 (ALT 250 FOR IP): Performed by: INTERNAL MEDICINE

## 2021-06-16 PROCEDURE — 99239 HOSP IP/OBS DSCHRG MGMT >30: CPT | Performed by: NURSE PRACTITIONER

## 2021-06-16 PROCEDURE — 6370000000 HC RX 637 (ALT 250 FOR IP): Performed by: PSYCHIATRY & NEUROLOGY

## 2021-06-16 RX ORDER — POLYETHYLENE GLYCOL 3350 17 G
2 POWDER IN PACKET (EA) ORAL PRN
Qty: 100 EACH | Refills: 3 | Status: SHIPPED | OUTPATIENT
Start: 2021-06-16

## 2021-06-16 RX ORDER — PALIPERIDONE 6 MG/1
6 TABLET, EXTENDED RELEASE ORAL DAILY
Status: DISCONTINUED | OUTPATIENT
Start: 2021-06-17 | End: 2021-06-16 | Stop reason: HOSPADM

## 2021-06-16 RX ORDER — TRAZODONE HYDROCHLORIDE 50 MG/1
50 TABLET ORAL NIGHTLY PRN
Status: DISCONTINUED | OUTPATIENT
Start: 2021-06-16 | End: 2021-06-16 | Stop reason: HOSPADM

## 2021-06-16 RX ADMIN — PALIPERIDONE 3 MG: 3 TABLET, EXTENDED RELEASE ORAL at 10:12

## 2021-06-16 RX ADMIN — PANTOPRAZOLE SODIUM 40 MG: 40 TABLET, DELAYED RELEASE ORAL at 06:40

## 2021-06-16 RX ADMIN — ARIPIPRAZOLE 10 MG: 10 TABLET ORAL at 09:28

## 2021-06-16 RX ADMIN — FUROSEMIDE 40 MG: 40 TABLET ORAL at 09:28

## 2021-06-16 RX ADMIN — SPIRONOLACTONE 25 MG: 25 TABLET ORAL at 09:27

## 2021-06-16 RX ADMIN — ALLOPURINOL 100 MG: 100 TABLET ORAL at 09:28

## 2021-06-16 RX ADMIN — GLIPIZIDE 10 MG: 5 TABLET ORAL at 06:40

## 2021-06-16 RX ADMIN — PAROXETINE 10 MG: 10 TABLET, FILM COATED ORAL at 09:28

## 2021-06-16 RX ADMIN — DIVALPROEX SODIUM 250 MG: 250 TABLET, DELAYED RELEASE ORAL at 09:28

## 2021-06-16 NOTE — GROUP NOTE
Group Therapy Note    Date: 6/16/2021    Group Start Time: 1005  Group End Time: 6402  Group Topic: Psychoeducation    [facility]    Everlena Severs, CTRS        Group Therapy Note    Type of Group: Psychoeducation    Wellness Binder Information  Module Name: symptoms of stress   Patient's Goal: patient will be able to id physical and emotional symptoms one experiences. Notes: pleasant and engaged in group. Willing to share when prompted. Status After Intervention:  Unchanged    Participation Level:  Active Listener and Interactive    Participation Quality: Appropriate, Attentive, Sharing, and Supportive      Speech: normal     Thought Process/Content: Logical      Affective Functioning: Congruent      Mood: euthymic      Level of consciousness:  Alert, Oriented x4, and Attentive      Response to Learning: Able to verbalize/acknowledge new learning, Able to retain information, and Progressing to goal      Endings: None Reported    Modes of Intervention: Education, Support, Socialization, Exploration, and Problem-solving      Discipline Responsible: Psychoeducational Specialist      Signature:  Robert Batres

## 2021-06-16 NOTE — CARE COORDINATION
Referral for ACT team faxed to [address] [place] [address] [place] . SW to call pt at [phone] with an update as he was discharged.

## 2021-06-16 NOTE — CARE COORDINATION
In order to ensure appropriate transition and discharge planning is in place, the following documents have been transmitted to [facility], as the new outpatient provider:     The d/c diagnosis was transmitted to the next care provider   The reason for hospitalization was transmitted to the next care provider   The d/c medications (dosage and indication) were transmitted to the next care provider    The continuing care plan was transmitted to the next care provider
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
## 2021-06-16 NOTE — BH NOTE
Out in day room sits by self mostly non social with peers A/H continue to improve denies any SI or HI emotional support given

## 2021-06-16 NOTE — BH NOTE
Pt discharged with followings belongings:   Dentures: None  Vision - Corrective Lenses: Glasses  Hearing Aid: None  Clothing: Pants, Shirt, Socks, Undergarments (Comment) (t-shirts)   Valuables sent home with patient on discharge. Valuables retrieved from safe, Security envelope number:   wallet and returned to patient. Patient left department with Departure Mode: By self via Mobility at Departure: Ambulatory, discharged to Discharged to: Private Residence. Patient education on aftercare instructions:  yes  Patient verbalize understanding of AVS:   yes. Given suicide prevention handout   . Discharged in stable condition. Status EXAM upon discharge:  Status and Exam  Normal: No  Facial Expression: Worried  Affect: Constricted  Level of Consciousness: Alert  Mood:Normal: No  Mood: Suspicious, Anxious, Depressed  Motor Activity:Normal: Yes  Motor Activity: Decreased  Interview Behavior: Cooperative  Preception: [place] to Person, Desai Ditto to Time, [place] to Place, [place] to Situation  Attention:Normal: No  Attention: Distractible  Thought Processes: Circumstantial  Thought Content:Normal: No  Thought Content: Paranoia  Hallucinations:  Auditory (Comment) (non command)  Delusions: Yes  Delusions: Persecution  Memory:Normal: No  Memory: Poor Recent  Insight and Judgment: No  Insight and Judgment: Poor Judgment, Poor Insight  Present Suicidal Ideation: No  Present Homicidal Ideation: No    Desi Miller RN

## 2021-06-16 NOTE — PROGRESS NOTES
Attended morning community meeting. Updated on staffing and daily routine. Shared goal for the day as to take my meds.

## 2021-08-22 NOTE — CARE COORDINATION
This note will not be viewable in [facility] for the following reason(s). This is a Psychotherapy Note. SW placed a call to mother for collateral contact. There was no answer, no voice mail available.       Electronically signed by YESICA Olson LSW on 1/29/2021 at 3:56 PM 18

## 2021-10-08 ENCOUNTER — HOSPITAL ENCOUNTER (OUTPATIENT)
Age: 41
Discharge: HOME | End: 2021-10-08
Payer: MEDICARE

## 2021-10-08 LAB
ALBUMIN SERPL-MCNC: 4 G/DL (ref 3.5–5.2)
ALP BLD-CCNC: 69 U/L (ref 40–129)
ALT SERPL-CCNC: 10 U/L (ref 0–40)
AMMONIA: 46 UMOL/L (ref 16–60)
ANION GAP SERPL CALCULATED.3IONS-SCNC: 8 MMOL/L (ref 7–16)
AST SERPL-CCNC: 17 U/L (ref 0–39)
BASOPHILS ABSOLUTE: 0.02 E9/L (ref 0–0.2)
BASOPHILS RELATIVE PERCENT: 0.2 % (ref 0–2)
BILIRUB SERPL-MCNC: 0.3 MG/DL (ref 0–1.2)
BUN BLDV-MCNC: 8 MG/DL (ref 6–20)
CALCIUM SERPL-MCNC: 9.8 MG/DL (ref 8.6–10.2)
CHLORIDE BLD-SCNC: 102 MMOL/L (ref 98–107)
CHOLESTEROL, FASTING: 168 MG/DL (ref 0–199)
CO2: 31 MMOL/L (ref 22–29)
CREAT SERPL-MCNC: 1.1 MG/DL (ref 0.7–1.2)
EOSINOPHILS ABSOLUTE: 0.07 E9/L (ref 0.05–0.5)
EOSINOPHILS RELATIVE PERCENT: 0.9 % (ref 0–6)
GFR AFRICAN AMERICAN: >60
GFR NON-AFRICAN AMERICAN: >60 ML/MIN/1.73
GLUCOSE BLD-MCNC: 94 MG/DL (ref 74–99)
HBA1C MFR BLD: 7.7 % (ref 4–5.6)
HCT VFR BLD CALC: 45.3 % (ref 37–54)
HDLC SERPL-MCNC: 34 MG/DL
HEMOGLOBIN: 14.4 G/DL (ref 12.5–16.5)
IMMATURE GRANULOCYTES #: 0.05 E9/L
IMMATURE GRANULOCYTES %: 0.6 % (ref 0–5)
LDL CHOLESTEROL CALCULATED: 108 MG/DL (ref 0–99)
LYMPHOCYTES ABSOLUTE: 1.88 E9/L (ref 1.5–4)
LYMPHOCYTES RELATIVE PERCENT: 22.9 % (ref 20–42)
MCH RBC QN AUTO: 28.1 PG (ref 26–35)
MCHC RBC AUTO-ENTMCNC: 31.8 % (ref 32–34.5)
MCV RBC AUTO: 88.3 FL (ref 80–99.9)
MONOCYTES ABSOLUTE: 0.75 E9/L (ref 0.1–0.95)
MONOCYTES RELATIVE PERCENT: 9.1 % (ref 2–12)
NEUTROPHILS ABSOLUTE: 5.45 E9/L (ref 1.8–7.3)
NEUTROPHILS RELATIVE PERCENT: 66.3 % (ref 43–80)
PDW BLD-RTO: 14.6 FL (ref 11.5–15)
PLATELET # BLD: 143 E9/L (ref 130–450)
PMV BLD AUTO: 10 FL (ref 7–12)
POTASSIUM SERPL-SCNC: 4.3 MMOL/L (ref 3.5–5)
PROLACTIN: 4.47 NG/ML
RBC # BLD: 5.13 E12/L (ref 3.8–5.8)
SODIUM BLD-SCNC: 141 MMOL/L (ref 132–146)
TOTAL PROTEIN: 7.4 G/DL (ref 6.4–8.3)
TRIGLYCERIDE, FASTING: 130 MG/DL (ref 0–149)
TSH SERPL DL<=0.05 MIU/L-ACNC: 0.67 UIU/ML (ref 0.27–4.2)
VALPROIC ACID LEVEL: 88 MCG/ML (ref 50–100)
VITAMIN D 25-HYDROXY: 17 NG/ML (ref 30–100)
VLDLC SERPL CALC-MCNC: 26 MG/DL
WBC # BLD: 8.2 E9/L (ref 4.5–11.5)

## 2021-10-08 PROCEDURE — 84443 ASSAY THYROID STIM HORMONE: CPT

## 2021-10-08 PROCEDURE — 83036 HEMOGLOBIN GLYCOSYLATED A1C: CPT

## 2021-10-08 PROCEDURE — 82140 ASSAY OF AMMONIA: CPT

## 2021-10-08 PROCEDURE — 82306 VITAMIN D 25 HYDROXY: CPT

## 2021-10-08 PROCEDURE — 84146 ASSAY OF PROLACTIN: CPT

## 2021-10-08 PROCEDURE — 36415 COLL VENOUS BLD VENIPUNCTURE: CPT

## 2021-10-08 PROCEDURE — 85025 COMPLETE CBC W/AUTO DIFF WBC: CPT

## 2021-10-08 PROCEDURE — 80061 LIPID PANEL: CPT

## 2021-10-08 PROCEDURE — 80164 ASSAY DIPROPYLACETIC ACD TOT: CPT

## 2021-10-08 PROCEDURE — 80053 COMPREHEN METABOLIC PANEL: CPT

## 2022-02-17 RX ORDER — LISINOPRIL 10 MG/1
10 TABLET ORAL DAILY
Qty: 30 TABLET | Refills: 0 | Status: SHIPPED
Start: 2022-02-17 | End: 2022-03-17 | Stop reason: SDUPTHER

## 2022-03-14 ENCOUNTER — OFFICE VISIT (OUTPATIENT)
Dept: CARDIOLOGY CLINIC | Age: 42
End: 2022-03-14
Payer: MEDICARE

## 2022-03-14 VITALS
RESPIRATION RATE: 16 BRPM | OXYGEN SATURATION: 95 % | HEIGHT: 69 IN | HEART RATE: 72 BPM | BODY MASS INDEX: 40.91 KG/M2 | WEIGHT: 276.2 LBS | SYSTOLIC BLOOD PRESSURE: 124 MMHG | DIASTOLIC BLOOD PRESSURE: 60 MMHG

## 2022-03-14 DIAGNOSIS — I10 PRIMARY HYPERTENSION: Primary | ICD-10-CM

## 2022-03-14 DIAGNOSIS — E66.01 MORBID OBESITY DUE TO EXCESS CALORIES (HCC): ICD-10-CM

## 2022-03-14 DIAGNOSIS — K21.9 HIATAL HERNIA WITH GASTROESOPHAGEAL REFLUX: ICD-10-CM

## 2022-03-14 DIAGNOSIS — E11.65 UNCONTROLLED TYPE 2 DIABETES MELLITUS WITH HYPERGLYCEMIA (HCC): ICD-10-CM

## 2022-03-14 DIAGNOSIS — N62 GYNECOMASTIA: ICD-10-CM

## 2022-03-14 DIAGNOSIS — I10 ESSENTIAL HYPERTENSION: ICD-10-CM

## 2022-03-14 DIAGNOSIS — Z86.79 H/O CARDIOMYOPATHY: ICD-10-CM

## 2022-03-14 DIAGNOSIS — F20.9 SCHIZOPHRENIA, UNSPECIFIED TYPE (HCC): ICD-10-CM

## 2022-03-14 DIAGNOSIS — Z86.79 H/O CHF: ICD-10-CM

## 2022-03-14 DIAGNOSIS — E55.9 VITAMIN D DEFICIENCY: ICD-10-CM

## 2022-03-14 DIAGNOSIS — F39 MOOD DISORDER (HCC): ICD-10-CM

## 2022-03-14 DIAGNOSIS — K44.9 HIATAL HERNIA WITH GASTROESOPHAGEAL REFLUX: ICD-10-CM

## 2022-03-14 DIAGNOSIS — G47.33 OSA (OBSTRUCTIVE SLEEP APNEA): ICD-10-CM

## 2022-03-14 DIAGNOSIS — Z91.199 MEDICAL NON-COMPLIANCE: ICD-10-CM

## 2022-03-14 DIAGNOSIS — Z72.0 TOBACCO ABUSE: ICD-10-CM

## 2022-03-14 DIAGNOSIS — E78.5 DYSLIPIDEMIA: ICD-10-CM

## 2022-03-14 PROCEDURE — 93000 ELECTROCARDIOGRAM COMPLETE: CPT | Performed by: INTERNAL MEDICINE

## 2022-03-14 PROCEDURE — G8417 CALC BMI ABV UP PARAM F/U: HCPCS | Performed by: INTERNAL MEDICINE

## 2022-03-14 PROCEDURE — G8484 FLU IMMUNIZE NO ADMIN: HCPCS | Performed by: INTERNAL MEDICINE

## 2022-03-14 PROCEDURE — 2022F DILAT RTA XM EVC RTNOPTHY: CPT | Performed by: INTERNAL MEDICINE

## 2022-03-14 PROCEDURE — 4004F PT TOBACCO SCREEN RCVD TLK: CPT | Performed by: INTERNAL MEDICINE

## 2022-03-14 PROCEDURE — 3046F HEMOGLOBIN A1C LEVEL >9.0%: CPT | Performed by: INTERNAL MEDICINE

## 2022-03-14 PROCEDURE — G8427 DOCREV CUR MEDS BY ELIG CLIN: HCPCS | Performed by: INTERNAL MEDICINE

## 2022-03-14 PROCEDURE — 99213 OFFICE O/P EST LOW 20 MIN: CPT | Performed by: INTERNAL MEDICINE

## 2022-03-14 RX ORDER — CARVEDILOL 25 MG/1
25 TABLET ORAL 2 TIMES DAILY
COMMUNITY
Start: 2022-03-07
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
## 2022-03-17 RX ORDER — LISINOPRIL 10 MG/1
10 TABLET ORAL DAILY
Qty: 30 TABLET | Refills: 5 | Status: SHIPPED
Start: 2022-03-17 | End: 2022-07-23

## 2022-05-12 NOTE — GROUP NOTE
Group Therapy Note    Date: 7/22/2020    Group Start Time: 1115  Group End Time: 200  Group Topic: Cognitive Skills    [facility] ACUTE [facility] 1    YESICA Cardoso, W    Number of participants: 11  Type of group: Cognitive Skills  Mode of intervention: Education, Support, Socialization, Exploration, Clarifying, and Problem-solving  Topic: Greatest Barrier to Mental Health  Objective: To identify greatest barrier to mental health and write a letter to it. Group Therapy Note        Notes:  Pt was an active participant in cognitive skills group focusing on identifying greatest barrier to mental health. Pt was able to identify a mental health barrier and write a letter to that barrier. Pt was able to provide supportive feedback to group members. Status After Intervention:  Improved    Participation Level:  Active Listener and Interactive    Participation Quality: Appropriate      Speech:  normal      Thought Process/Content: Logical      Affective Functioning: Congruent      Mood: depressed      Level of consciousness:  Alert, Oriented x4 and Attentive      Response to Learning: Progressing to goal      Endings: None Reported    Modes of Intervention: Education and Support      Discipline Responsible: /Counselor      Signature:  YESICA Cardoso, [facility] DH (Discharge Huddle; Vulnerable Patient)

## 2022-07-23 ENCOUNTER — HOSPITAL ENCOUNTER (EMERGENCY)
Age: 42
Discharge: HOME | End: 2022-07-23
Attending: EMERGENCY MEDICINE
Payer: MEDICARE

## 2022-07-23 VITALS
SYSTOLIC BLOOD PRESSURE: 143 MMHG | TEMPERATURE: 99 F | RESPIRATION RATE: 18 BRPM | HEART RATE: 74 BPM | DIASTOLIC BLOOD PRESSURE: 81 MMHG | HEIGHT: 71 IN | BODY MASS INDEX: 38.5 KG/M2 | OXYGEN SATURATION: 96 % | WEIGHT: 275 LBS

## 2022-07-23 DIAGNOSIS — R45.86 MOOD SWINGS: ICD-10-CM

## 2022-07-23 DIAGNOSIS — R44.0 AUDITORY HALLUCINATIONS: Primary | ICD-10-CM

## 2022-07-23 LAB
ACETAMINOPHEN LEVEL: <5 MCG/ML (ref 10–30)
ALBUMIN SERPL-MCNC: 4.6 G/DL (ref 3.5–5.2)
ALP BLD-CCNC: 77 U/L (ref 40–129)
ALT SERPL-CCNC: 14 U/L (ref 0–40)
AMPHETAMINE SCREEN, URINE: NOT DETECTED
ANION GAP SERPL CALCULATED.3IONS-SCNC: 10 MMOL/L (ref 7–16)
AST SERPL-CCNC: 17 U/L (ref 0–39)
BARBITURATE SCREEN URINE: NOT DETECTED
BASOPHILS ABSOLUTE: 0.02 E9/L (ref 0–0.2)
BASOPHILS RELATIVE PERCENT: 0.3 % (ref 0–2)
BENZODIAZEPINE SCREEN, URINE: NOT DETECTED
BILIRUB SERPL-MCNC: 0.3 MG/DL (ref 0–1.2)
BUN BLDV-MCNC: 12 MG/DL (ref 6–20)
CALCIUM SERPL-MCNC: 9.4 MG/DL (ref 8.6–10.2)
CANNABINOID SCREEN URINE: NOT DETECTED
CHLORIDE BLD-SCNC: 100 MMOL/L (ref 98–107)
CO2: 29 MMOL/L (ref 22–29)
COCAINE METABOLITE SCREEN URINE: NOT DETECTED
CREAT SERPL-MCNC: 1.2 MG/DL (ref 0.7–1.2)
EKG ATRIAL RATE: 73 BPM
EKG P AXIS: 62 DEGREES
EKG P-R INTERVAL: 176 MS
EKG Q-T INTERVAL: 396 MS
EKG QRS DURATION: 88 MS
EKG QTC CALCULATION (BAZETT): 436 MS
EKG R AXIS: 40 DEGREES
EKG T AXIS: 22 DEGREES
EKG VENTRICULAR RATE: 73 BPM
EOSINOPHILS ABSOLUTE: 0.05 E9/L (ref 0.05–0.5)
EOSINOPHILS RELATIVE PERCENT: 0.8 % (ref 0–6)
ETHANOL: <10 MG/DL (ref 0–0.08)
FENTANYL SCREEN, URINE: NOT DETECTED
GFR AFRICAN AMERICAN: >60
GFR NON-AFRICAN AMERICAN: >60 ML/MIN/1.73
GLUCOSE BLD-MCNC: 163 MG/DL (ref 74–99)
HCT VFR BLD CALC: 46.9 % (ref 37–54)
HEMOGLOBIN: 14.9 G/DL (ref 12.5–16.5)
IMMATURE GRANULOCYTES #: 0.03 E9/L
IMMATURE GRANULOCYTES %: 0.5 % (ref 0–5)
INFLUENZA A: NOT DETECTED
INFLUENZA B: NOT DETECTED
LYMPHOCYTES ABSOLUTE: 1.91 E9/L (ref 1.5–4)
LYMPHOCYTES RELATIVE PERCENT: 29.6 % (ref 20–42)
Lab: NORMAL
MCH RBC QN AUTO: 27.7 PG (ref 26–35)
MCHC RBC AUTO-ENTMCNC: 31.8 % (ref 32–34.5)
MCV RBC AUTO: 87.2 FL (ref 80–99.9)
METHADONE SCREEN, URINE: NOT DETECTED
MONOCYTES ABSOLUTE: 0.53 E9/L (ref 0.1–0.95)
MONOCYTES RELATIVE PERCENT: 8.2 % (ref 2–12)
NEUTROPHILS ABSOLUTE: 3.92 E9/L (ref 1.8–7.3)
NEUTROPHILS RELATIVE PERCENT: 60.6 % (ref 43–80)
OPIATE SCREEN URINE: NOT DETECTED
OXYCODONE URINE: NOT DETECTED
PDW BLD-RTO: 15.2 FL (ref 11.5–15)
PHENCYCLIDINE SCREEN URINE: NOT DETECTED
PLATELET # BLD: 174 E9/L (ref 130–450)
PMV BLD AUTO: 9.7 FL (ref 7–12)
POTASSIUM REFLEX MAGNESIUM: 4.1 MMOL/L (ref 3.5–5)
RBC # BLD: 5.38 E12/L (ref 3.8–5.8)
SALICYLATE, SERUM: <0.3 MG/DL (ref 0–30)
SARS-COV-2 RNA, RT PCR: NOT DETECTED
SODIUM BLD-SCNC: 139 MMOL/L (ref 132–146)
TOTAL PROTEIN: 7.7 G/DL (ref 6.4–8.3)
TRICYCLIC ANTIDEPRESSANTS SCREEN SERUM: NEGATIVE NG/ML
VALPROIC ACID LEVEL: 39 MCG/ML (ref 50–100)
WBC # BLD: 6.5 E9/L (ref 4.5–11.5)

## 2022-07-23 PROCEDURE — 85025 COMPLETE CBC W/AUTO DIFF WBC: CPT

## 2022-07-23 PROCEDURE — 82077 ASSAY SPEC XCP UR&BREATH IA: CPT

## 2022-07-23 PROCEDURE — 80307 DRUG TEST PRSMV CHEM ANLYZR: CPT

## 2022-07-23 PROCEDURE — 99284 EMERGENCY DEPT VISIT MOD MDM: CPT

## 2022-07-23 PROCEDURE — 80164 ASSAY DIPROPYLACETIC ACD TOT: CPT

## 2022-07-23 PROCEDURE — 87636 SARSCOV2 & INF A&B AMP PRB: CPT

## 2022-07-23 PROCEDURE — 80143 DRUG ASSAY ACETAMINOPHEN: CPT

## 2022-07-23 PROCEDURE — 80179 DRUG ASSAY SALICYLATE: CPT

## 2022-07-23 PROCEDURE — 80053 COMPREHEN METABOLIC PANEL: CPT

## 2022-07-23 PROCEDURE — 93005 ELECTROCARDIOGRAM TRACING: CPT | Performed by: STUDENT IN AN ORGANIZED HEALTH CARE EDUCATION/TRAINING PROGRAM

## 2022-07-23 ASSESSMENT — PAIN - FUNCTIONAL ASSESSMENT: PAIN_FUNCTIONAL_ASSESSMENT: NONE - DENIES PAIN
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
## 2022-07-23 NOTE — ED NOTES
Behavioral Health Crisis Assessment      Chief Complaint: Pt is a 37 yo male who presents to the ER accompanied by his mother, pt is not on a pink slip at this time. Reports a recent increase in symptoms of increased auditory hallucinations and mood swings. Pt has hx of command hallucinations, states this is not happening at this time. States he came to the ER because he feels he needs a meds adjustment. Denies suicidal ideation intent or plan, denies homicidal ideation intent or plan. Mental Status Exam: Alert, oriented x4, mood stable, affect is severely restricted, eye contact good, speech normal in rate flow and volume, behavior is cooperative, appropriate, no signs of agitation, thought form is organized, thought content has some mild hint of paranoia, reports auditory hallucinations, denies visual at this time, denies paranoia, delusions. Legal Status  [x] Voluntary:  [] Involuntary, Issued by:    Gender  [x] Male [] Female [] Transgender  [] Other    Sexual Orientation    [x] Heterosexual [] Homosexual [] Bisexual [] Other    Brief Clinical Summary: Pt report he is open at [facility]. Reports he had Zoom session with his meds prescriber earlier this week, states he told prescriber about increased hallucinations, no meds changes. Hx on [facility], last was 6/11/2021, dx: Schizoaffective disorder, reports prescribed Paxil, Abilify. Reports he is compliant with medications. .     Collateral Information: Mother is with pt, confirms meds compliance.     Risk Factors:  Mental health dx: Schizoaffective disorder  Hx of previous in-patient psychiatric admission  Reports limited friend support    Protective Factors:  Strong family support (mother)  Outpatient provider  Initiated this ER visit  Safe and stable housing  Access to essential needs  Medication compliant  No access to weapons  Steady income    Suicidal Ideations:   [] Reports:    [] Past [] Present   [x] Denies    Suicide Attempts:  [] Reports:   [x] Denies    C-SSRS Screening Completed by RN: Current Suicide Risk:  [] No Risk [] Low [] Moderate [] High    Homicidal Ideations  [] Reports:   [] Past [] Present   [x] Denies     Self Injurious/Self Mutilation Behaviors:   [] Reports:    [] Past [] Present   [x] Denies    Hallucinations/Delusions   [x] Reports: Auditory hallucinations  [] Denies     Substance Use/Alcohol Use/Addiction:   [] Reports:   [x] Denies   [] SBIRT Screen Complete. Current or Past Substance Abuse Treatment  [] Yes, When and Where:  [x] No    Current or Past Mental Health Treatment:  [x] Yes, When and Where: [facility] currently, Myriam Jos[facility] [address] 6/11/2021  [] No    Legal Issues:  []  Yes (Specify)  [x]  No    Access to Weapons:  []  Yes (Specify)  [x]  No    Trauma History  [] Reports:  [x] Denies     Living Situation: Independent apartment    Employment: None    Education Level: High school    Violence Risk Screening:        Have you ever thought about hurting someone? [x]  No  []  Yes (Ask the questions listed below)   When? Did you follow through with the thoughts? [] No     [] Yes- When and what happened? 2.  Have you ever threatened anyone? [x]  No  []  Yes (Ask the questions listed below)   When and what happened? Have you ever threatened someone with a gun, knife or other weapon? []  No  []  Yes - When and what happened? 2. Have you ever had an order of protection taken out against you? []  Yes [x]  No  3. Have you ever been arrested due to violence? []  Yes [x]  No  4. Have you ever been cruel to animals?  []  Yes [x]  No    After consideration of C-SSRS screening results, C-SSRS assessments, and this professional's assessment the patient's overall suicide risk assessed to be:  [] No Risk  [x] Low   [] Moderate   [] High     [x] Discussed current suicide risk, protective and risk factors with RN and ED Physician     Disposition   [x] Home: To follow with outpatient provider [facility] on Monday  [] Outpatient Provider: [] Crisis Unit:   [] Inpatient Psychiatric Unit:  [] Other:                    YESICA Uribe, [place]  07/23/22 1951

## 2022-07-23 NOTE — ED NOTES
Patient denies SI/HI  He is agreeable to discharge and follow up on Monday with [facility] Gan. Discharge instructions reviewed with patient and he denies any questions.        Lorrain Osgood, RN  07/23/22 9965

## 2022-07-23 NOTE — DISCHARGE INSTRUCTIONS
You are being discharged to home you can return if your symptoms get worse. You need to follow up with Aniyah Santos on Monday. [place] [address]  [place], [address]  [phone]     Help Hotline 927 115-0512 or 9-228.780.3691 or 211   24 hour crisis line for the [address]. [facility]    PEER WARM LINE: [phone]  Monday through Friday 4pm to 8pm and Saturday 1pm-3pm   You can call during these times to talk with a peer support person as needed

## 2022-07-27 ENCOUNTER — HOSPITAL ENCOUNTER (OUTPATIENT)
Age: 42
Discharge: HOME | End: 2022-07-27
Payer: MEDICARE

## 2022-07-27 LAB
ALBUMIN SERPL-MCNC: 4.2 G/DL (ref 3.5–5.2)
ALP BLD-CCNC: 79 U/L (ref 40–129)
ALT SERPL-CCNC: 26 U/L (ref 0–40)
AMMONIA: 51 UMOL/L (ref 16–60)
ANION GAP SERPL CALCULATED.3IONS-SCNC: 11 MMOL/L (ref 7–16)
AST SERPL-CCNC: 20 U/L (ref 0–39)
BASOPHILS ABSOLUTE: 0.02 E9/L (ref 0–0.2)
BASOPHILS RELATIVE PERCENT: 0.3 % (ref 0–2)
BILIRUB SERPL-MCNC: 0.3 MG/DL (ref 0–1.2)
BUN BLDV-MCNC: 11 MG/DL (ref 6–20)
CALCIUM SERPL-MCNC: 9.3 MG/DL (ref 8.6–10.2)
CHLORIDE BLD-SCNC: 101 MMOL/L (ref 98–107)
CHOLESTEROL, TOTAL: 190 MG/DL (ref 0–199)
CO2: 29 MMOL/L (ref 22–29)
CREAT SERPL-MCNC: 1.1 MG/DL (ref 0.7–1.2)
EKG ATRIAL RATE: 70 BPM
EKG P AXIS: 63 DEGREES
EKG P-R INTERVAL: 186 MS
EKG Q-T INTERVAL: 410 MS
EKG QRS DURATION: 82 MS
EKG QTC CALCULATION (BAZETT): 442 MS
EKG R AXIS: 41 DEGREES
EKG T AXIS: 35 DEGREES
EKG VENTRICULAR RATE: 70 BPM
EOSINOPHILS ABSOLUTE: 0.06 E9/L (ref 0.05–0.5)
EOSINOPHILS RELATIVE PERCENT: 0.9 % (ref 0–6)
GFR AFRICAN AMERICAN: >60
GFR NON-AFRICAN AMERICAN: >60 ML/MIN/1.73
GLUCOSE BLD-MCNC: 118 MG/DL (ref 74–99)
HBA1C MFR BLD: 7.9 % (ref 4–5.6)
HCT VFR BLD CALC: 48 % (ref 37–54)
HDLC SERPL-MCNC: 42 MG/DL
HEMOGLOBIN: 15.1 G/DL (ref 12.5–16.5)
IMMATURE GRANULOCYTES #: 0.03 E9/L
IMMATURE GRANULOCYTES %: 0.5 % (ref 0–5)
LDL CHOLESTEROL CALCULATED: 127 MG/DL (ref 0–99)
LYMPHOCYTES ABSOLUTE: 2.09 E9/L (ref 1.5–4)
LYMPHOCYTES RELATIVE PERCENT: 32.1 % (ref 20–42)
MCH RBC QN AUTO: 27.7 PG (ref 26–35)
MCHC RBC AUTO-ENTMCNC: 31.5 % (ref 32–34.5)
MCV RBC AUTO: 87.9 FL (ref 80–99.9)
MONOCYTES ABSOLUTE: 0.54 E9/L (ref 0.1–0.95)
MONOCYTES RELATIVE PERCENT: 8.3 % (ref 2–12)
NEUTROPHILS ABSOLUTE: 3.78 E9/L (ref 1.8–7.3)
NEUTROPHILS RELATIVE PERCENT: 57.9 % (ref 43–80)
PDW BLD-RTO: 15.1 FL (ref 11.5–15)
PLATELET # BLD: 171 E9/L (ref 130–450)
PMV BLD AUTO: 10.2 FL (ref 7–12)
POTASSIUM SERPL-SCNC: 4.2 MMOL/L (ref 3.5–5)
PROLACTIN: 3.69 NG/ML
RBC # BLD: 5.46 E12/L (ref 3.8–5.8)
SODIUM BLD-SCNC: 141 MMOL/L (ref 132–146)
TOTAL PROTEIN: 7.3 G/DL (ref 6.4–8.3)
TRIGL SERPL-MCNC: 105 MG/DL (ref 0–149)
VALPROIC ACID LEVEL: 89 MCG/ML (ref 50–100)
VITAMIN D 25-HYDROXY: 24 NG/ML (ref 30–100)
VLDLC SERPL CALC-MCNC: 21 MG/DL
WBC # BLD: 6.5 E9/L (ref 4.5–11.5)

## 2022-07-27 PROCEDURE — 93005 ELECTROCARDIOGRAM TRACING: CPT | Performed by: NURSE PRACTITIONER

## 2022-07-27 PROCEDURE — 36415 COLL VENOUS BLD VENIPUNCTURE: CPT

## 2022-07-27 PROCEDURE — 82140 ASSAY OF AMMONIA: CPT

## 2022-07-27 PROCEDURE — 82306 VITAMIN D 25 HYDROXY: CPT

## 2022-07-27 PROCEDURE — 80061 LIPID PANEL: CPT

## 2022-07-27 PROCEDURE — 80164 ASSAY DIPROPYLACETIC ACD TOT: CPT

## 2022-07-27 PROCEDURE — 85025 COMPLETE CBC W/AUTO DIFF WBC: CPT

## 2022-07-27 PROCEDURE — 84146 ASSAY OF PROLACTIN: CPT

## 2022-07-27 PROCEDURE — 83036 HEMOGLOBIN GLYCOSYLATED A1C: CPT

## 2022-07-27 PROCEDURE — 80053 COMPREHEN METABOLIC PANEL: CPT

## 2022-10-31 ENCOUNTER — HOSPITAL ENCOUNTER (OUTPATIENT)
Age: 42
Discharge: HOME | End: 2022-10-31
Payer: MEDICARE

## 2022-10-31 LAB
ALBUMIN SERPL-MCNC: 4.5 G/DL (ref 3.5–5.2)
ALP BLD-CCNC: 73 U/L (ref 40–129)
ALT SERPL-CCNC: 13 U/L (ref 0–40)
AMMONIA: 29 UMOL/L (ref 16–60)
ANION GAP SERPL CALCULATED.3IONS-SCNC: 11 MMOL/L (ref 7–16)
AST SERPL-CCNC: 15 U/L (ref 0–39)
BILIRUB SERPL-MCNC: 0.4 MG/DL (ref 0–1.2)
BUN BLDV-MCNC: 12 MG/DL (ref 6–20)
CALCIUM SERPL-MCNC: 9.9 MG/DL (ref 8.6–10.2)
CHLORIDE BLD-SCNC: 100 MMOL/L (ref 98–107)
CHOLESTEROL, TOTAL: 176 MG/DL (ref 0–199)
CO2: 28 MMOL/L (ref 22–29)
CREAT SERPL-MCNC: 1 MG/DL (ref 0.7–1.2)
GFR SERPL CREATININE-BSD FRML MDRD: >60 ML/MIN/1.73
GLUCOSE BLD-MCNC: 111 MG/DL (ref 74–99)
HBA1C MFR BLD: 8.2 % (ref 4–5.6)
HDLC SERPL-MCNC: 45 MG/DL
LDL CHOLESTEROL CALCULATED: 110 MG/DL (ref 0–99)
POTASSIUM SERPL-SCNC: 4.4 MMOL/L (ref 3.5–5)
PROLACTIN: 4.57 NG/ML
SODIUM BLD-SCNC: 139 MMOL/L (ref 132–146)
TOTAL PROTEIN: 7.2 G/DL (ref 6.4–8.3)
TRIGL SERPL-MCNC: 106 MG/DL (ref 0–149)
TSH SERPL DL<=0.05 MIU/L-ACNC: 0.97 UIU/ML (ref 0.27–4.2)
VALPROIC ACID LEVEL: 56 MCG/ML (ref 50–100)
VITAMIN D 25-HYDROXY: 17 NG/ML (ref 30–100)
VLDLC SERPL CALC-MCNC: 21 MG/DL

## 2022-10-31 PROCEDURE — 85025 COMPLETE CBC W/AUTO DIFF WBC: CPT

## 2022-10-31 PROCEDURE — 80061 LIPID PANEL: CPT

## 2022-10-31 PROCEDURE — G0103 PSA SCREENING: HCPCS

## 2022-10-31 PROCEDURE — 80053 COMPREHEN METABOLIC PANEL: CPT

## 2022-10-31 PROCEDURE — 82306 VITAMIN D 25 HYDROXY: CPT

## 2022-10-31 PROCEDURE — 82140 ASSAY OF AMMONIA: CPT

## 2022-10-31 PROCEDURE — 36415 COLL VENOUS BLD VENIPUNCTURE: CPT

## 2022-10-31 PROCEDURE — 80164 ASSAY DIPROPYLACETIC ACD TOT: CPT

## 2022-10-31 PROCEDURE — 83036 HEMOGLOBIN GLYCOSYLATED A1C: CPT

## 2022-10-31 PROCEDURE — 84443 ASSAY THYROID STIM HORMONE: CPT

## 2022-10-31 PROCEDURE — 84146 ASSAY OF PROLACTIN: CPT

## 2022-11-01 LAB
BASOPHILS ABSOLUTE: 0.02 E9/L (ref 0–0.2)
BASOPHILS RELATIVE PERCENT: 0.3 % (ref 0–2)
EOSINOPHILS ABSOLUTE: 0.05 E9/L (ref 0.05–0.5)
EOSINOPHILS RELATIVE PERCENT: 0.8 % (ref 0–6)
HCT VFR BLD CALC: 50 % (ref 37–54)
HEMOGLOBIN: 16 G/DL (ref 12.5–16.5)
IMMATURE GRANULOCYTES #: 0.02 E9/L
IMMATURE GRANULOCYTES %: 0.3 % (ref 0–5)
LYMPHOCYTES ABSOLUTE: 1.96 E9/L (ref 1.5–4)
LYMPHOCYTES RELATIVE PERCENT: 29.7 % (ref 20–42)
MCH RBC QN AUTO: 28.4 PG (ref 26–35)
MCHC RBC AUTO-ENTMCNC: 32 % (ref 32–34.5)
MCV RBC AUTO: 88.7 FL (ref 80–99.9)
MONOCYTES ABSOLUTE: 0.65 E9/L (ref 0.1–0.95)
MONOCYTES RELATIVE PERCENT: 9.8 % (ref 2–12)
NEUTROPHILS ABSOLUTE: 3.91 E9/L (ref 1.8–7.3)
NEUTROPHILS RELATIVE PERCENT: 59.1 % (ref 43–80)
PDW BLD-RTO: 15.7 FL (ref 11.5–15)
PLATELET # BLD: 146 E9/L (ref 130–450)
PMV BLD AUTO: 11.1 FL (ref 7–12)
RBC # BLD: 5.64 E12/L (ref 3.8–5.8)
WBC # BLD: 6.6 E9/L (ref 4.5–11.5)

## 2022-11-02 LAB — PROSTATE SPECIFIC ANTIGEN: ABNORMAL NG/ML (ref 0–4)

## 2023-06-30 ENCOUNTER — HOSPITAL ENCOUNTER (OUTPATIENT)
Age: 43
Discharge: HOME | End: 2023-06-30
Payer: MEDICARE

## 2023-06-30 LAB
ALBUMIN SERPL-MCNC: 4.1 G/DL (ref 3.5–5.2)
ALP SERPL-CCNC: 66 U/L (ref 40–129)
ALT SERPL-CCNC: 11 U/L (ref 0–40)
ANION GAP SERPL CALCULATED.3IONS-SCNC: 12 MMOL/L (ref 7–16)
AST SERPL-CCNC: 15 U/L (ref 0–39)
BASOPHILS # BLD: 0.01 E9/L (ref 0–0.2)
BASOPHILS NFR BLD: 0.2 % (ref 0–2)
BILIRUB SERPL-MCNC: 0.3 MG/DL (ref 0–1.2)
BUN SERPL-MCNC: 14 MG/DL (ref 6–20)
CALCIUM SERPL-MCNC: 9.7 MG/DL (ref 8.6–10.2)
CHLORIDE SERPL-SCNC: 101 MMOL/L (ref 98–107)
CHOLESTEROL, TOTAL: 134 MG/DL (ref 0–199)
CO2 SERPL-SCNC: 28 MMOL/L (ref 22–29)
CREAT SERPL-MCNC: 1.1 MG/DL (ref 0.7–1.2)
EOSINOPHIL # BLD: 0.03 E9/L (ref 0.05–0.5)
EOSINOPHIL NFR BLD: 0.5 % (ref 0–6)
ERYTHROCYTE [DISTWIDTH] IN BLOOD BY AUTOMATED COUNT: 15 FL (ref 11.5–15)
GLUCOSE SERPL-MCNC: 73 MG/DL (ref 74–99)
HBA1C MFR BLD: 6.6 % (ref 4–5.6)
HCT VFR BLD AUTO: 50.8 % (ref 37–54)
HDLC SERPL-MCNC: 42 MG/DL
HGB BLD-MCNC: 15.2 G/DL (ref 12.5–16.5)
IMM GRANULOCYTES # BLD: 0.03 E9/L
IMM GRANULOCYTES NFR BLD: 0.5 % (ref 0–5)
LDLC SERPL CALC-MCNC: 74 MG/DL (ref 0–99)
LYMPHOCYTES # BLD: 1.76 E9/L (ref 1.5–4)
LYMPHOCYTES NFR BLD: 28.1 % (ref 20–42)
MCH RBC QN AUTO: 27.1 PG (ref 26–35)
MCHC RBC AUTO-ENTMCNC: 29.9 % (ref 32–34.5)
MCV RBC AUTO: 90.7 FL (ref 80–99.9)
MONOCYTES # BLD: 0.61 E9/L (ref 0.1–0.95)
MONOCYTES NFR BLD: 9.7 % (ref 2–12)
NEUTROPHILS # BLD: 3.83 E9/L (ref 1.8–7.3)
NEUTS SEG NFR BLD: 61 % (ref 43–80)
PLATELET # BLD AUTO: 135 E9/L (ref 130–450)
PMV BLD AUTO: 10.1 FL (ref 7–12)
POTASSIUM SERPL-SCNC: 4.1 MMOL/L (ref 3.5–5)
PROT SERPL-MCNC: 6.8 G/DL (ref 6.4–8.3)
RBC # BLD AUTO: 5.6 E12/L (ref 3.8–5.8)
SODIUM SERPL-SCNC: 141 MMOL/L (ref 132–146)
TRIGL SERPL-MCNC: 90 MG/DL (ref 0–149)
VALPROATE SERPL-MCNC: 48 MCG/ML (ref 50–100)
VLDLC SERPL CALC-MCNC: 18 MG/DL
WBC # BLD: 6.3 E9/L (ref 4.5–11.5)

## 2023-06-30 PROCEDURE — 83036 HEMOGLOBIN GLYCOSYLATED A1C: CPT

## 2023-06-30 PROCEDURE — 80053 COMPREHEN METABOLIC PANEL: CPT

## 2023-06-30 PROCEDURE — 80061 LIPID PANEL: CPT

## 2023-06-30 PROCEDURE — 36415 COLL VENOUS BLD VENIPUNCTURE: CPT

## 2023-06-30 PROCEDURE — 80164 ASSAY DIPROPYLACETIC ACD TOT: CPT

## 2023-06-30 PROCEDURE — 85025 COMPLETE CBC W/AUTO DIFF WBC: CPT

## 2023-09-20 ENCOUNTER — HOSPITAL ENCOUNTER (EMERGENCY)
Age: 43
Discharge: HOME | End: 2023-09-20
Payer: MEDICARE

## 2023-09-20 VITALS
TEMPERATURE: 97.8 F | RESPIRATION RATE: 20 BRPM | DIASTOLIC BLOOD PRESSURE: 73 MMHG | HEART RATE: 88 BPM | SYSTOLIC BLOOD PRESSURE: 150 MMHG | BODY MASS INDEX: 44.28 KG/M2 | WEIGHT: 299 LBS | OXYGEN SATURATION: 96 % | HEIGHT: 69 IN

## 2023-09-20 DIAGNOSIS — K04.7 DENTAL ABSCESS: Primary | ICD-10-CM

## 2023-09-20 DIAGNOSIS — K02.9 DENTAL DECAY: ICD-10-CM

## 2023-09-20 PROCEDURE — 99283 EMERGENCY DEPT VISIT LOW MDM: CPT

## 2023-09-20 PROCEDURE — 6370000000 HC RX 637 (ALT 250 FOR IP): Performed by: NURSE PRACTITIONER

## 2023-09-20 RX ORDER — LIDOCAINE HYDROCHLORIDE 20 MG/ML
15 SOLUTION OROPHARYNGEAL
Qty: 100 ML | Refills: 0 | Status: SHIPPED | OUTPATIENT
Start: 2023-09-20

## 2023-09-20 RX ORDER — LIDOCAINE HYDROCHLORIDE 20 MG/ML
15 SOLUTION OROPHARYNGEAL ONCE
Status: COMPLETED | OUTPATIENT
Start: 2023-09-20 | End: 2023-09-20

## 2023-09-20 RX ORDER — IBUPROFEN 800 MG/1
800 TABLET ORAL ONCE
Status: COMPLETED | OUTPATIENT
Start: 2023-09-20 | End: 2023-09-20

## 2023-09-20 RX ORDER — AMOXICILLIN AND CLAVULANATE POTASSIUM 875; 125 MG/1; MG/1
1 TABLET, FILM COATED ORAL 2 TIMES DAILY
Qty: 20 TABLET | Refills: 0 | Status: SHIPPED | OUTPATIENT
Start: 2023-09-20 | End: 2023-09-30

## 2023-09-20 RX ORDER — LIDOCAINE HYDROCHLORIDE 10 MG/ML
5 INJECTION, SOLUTION INFILTRATION; PERINEURAL ONCE
Status: DISCONTINUED | OUTPATIENT
Start: 2023-09-20 | End: 2023-09-20

## 2023-09-20 RX ORDER — AMOXICILLIN AND CLAVULANATE POTASSIUM 875; 125 MG/1; MG/1
1 TABLET, FILM COATED ORAL ONCE
Status: COMPLETED | OUTPATIENT
Start: 2023-09-20 | End: 2023-09-20

## 2023-09-20 RX ADMIN — LIDOCAINE HYDROCHLORIDE 15 ML: 20 SOLUTION ORAL; TOPICAL at 02:08

## 2023-09-20 RX ADMIN — AMOXICILLIN AND CLAVULANATE POTASSIUM 1 TABLET: 875; 125 TABLET, FILM COATED ORAL at 02:05

## 2023-09-20 RX ADMIN — IBUPROFEN 800 MG: 800 TABLET, FILM COATED ORAL at 02:06

## 2023-09-20 ASSESSMENT — PATIENT HEALTH QUESTIONNAIRE - PHQ9
1. LITTLE INTEREST OR PLEASURE IN DOING THINGS: 0
SUM OF ALL RESPONSES TO PHQ9 QUESTIONS 1 & 2: 0
SUM OF ALL RESPONSES TO PHQ QUESTIONS 1-9: 0
2. FEELING DOWN, DEPRESSED OR HOPELESS: 0
SUM OF ALL RESPONSES TO PHQ QUESTIONS 1-9: 0

## 2023-09-20 ASSESSMENT — PAIN DESCRIPTION - LOCATION: LOCATION: TEETH

## 2023-09-20 ASSESSMENT — PAIN - FUNCTIONAL ASSESSMENT: PAIN_FUNCTIONAL_ASSESSMENT: 0-10

## 2023-09-20 ASSESSMENT — PAIN DESCRIPTION - DESCRIPTORS: DESCRIPTORS: ACHING

## 2023-09-20 ASSESSMENT — PAIN SCALES - GENERAL: PAINLEVEL_OUTOF10: 9

## 2023-09-20 ASSESSMENT — LIFESTYLE VARIABLES: HOW OFTEN DO YOU HAVE A DRINK CONTAINING ALCOHOL: NEVER

## 2023-09-20 ASSESSMENT — PAIN DESCRIPTION - ORIENTATION: ORIENTATION: LEFT;LOWER

## 2023-09-20 NOTE — DISCHARGE INSTRUCTIONS
Follow-up with [facility], take the antibiotic as prescribed. You take Motrin or Tylenol for additional pain relief.
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
## 2024-03-26 ENCOUNTER — HOSPITAL ENCOUNTER (OUTPATIENT)
Age: 44
Discharge: HOME | End: 2024-03-26
Payer: MEDICARE

## 2024-03-26 LAB
ALBUMIN SERPL-MCNC: 4.2 G/DL (ref 3.5–5.2)
ALP SERPL-CCNC: 75 U/L (ref 40–129)
ALT SERPL-CCNC: 13 U/L (ref 0–40)
ANION GAP SERPL CALCULATED.3IONS-SCNC: 15 MMOL/L (ref 7–16)
AST SERPL-CCNC: 16 U/L (ref 0–39)
BASOPHILS # BLD: 0.02 K/UL (ref 0–0.2)
BASOPHILS NFR BLD: 0 % (ref 0–2)
BILIRUB SERPL-MCNC: 0.3 MG/DL (ref 0–1.2)
BUN SERPL-MCNC: 7 MG/DL (ref 6–20)
CALCIUM SERPL-MCNC: 9.4 MG/DL (ref 8.6–10.2)
CHLORIDE SERPL-SCNC: 102 MMOL/L (ref 98–107)
CHOLEST SERPL-MCNC: 166 MG/DL
CO2 SERPL-SCNC: 25 MMOL/L (ref 22–29)
CREAT SERPL-MCNC: 1 MG/DL (ref 0.7–1.2)
DATE LAST DOSE: NORMAL
EOSINOPHIL # BLD: 0.03 K/UL (ref 0.05–0.5)
EOSINOPHILS RELATIVE PERCENT: 1 % (ref 0–6)
ERYTHROCYTE [DISTWIDTH] IN BLOOD BY AUTOMATED COUNT: 15.6 % (ref 11.5–15)
GFR SERPL CREATININE-BSD FRML MDRD: >90 ML/MIN/1.73M2
GLUCOSE SERPL-MCNC: 122 MG/DL (ref 74–99)
HBA1C MFR BLD: 7.3 % (ref 4–5.6)
HCT VFR BLD AUTO: 52.5 % (ref 37–54)
HDLC SERPL-MCNC: 42 MG/DL
HGB BLD-MCNC: 16.8 G/DL (ref 12.5–16.5)
IMM GRANULOCYTES # BLD AUTO: 0.04 K/UL (ref 0–0.58)
IMM GRANULOCYTES NFR BLD: 1 % (ref 0–5)
LDLC SERPL CALC-MCNC: 109 MG/DL
LYMPHOCYTES NFR BLD: 1.74 K/UL (ref 1.5–4)
LYMPHOCYTES RELATIVE PERCENT: 26 % (ref 20–42)
MCH RBC QN AUTO: 28.9 PG (ref 26–35)
MCHC RBC AUTO-ENTMCNC: 32 G/DL (ref 32–34.5)
MCV RBC AUTO: 90.4 FL (ref 80–99.9)
MONOCYTES NFR BLD: 0.57 K/UL (ref 0.1–0.95)
MONOCYTES NFR BLD: 9 % (ref 2–12)
NEUTROPHILS NFR BLD: 64 % (ref 43–80)
NEUTS SEG NFR BLD: 4.2 K/UL (ref 1.8–7.3)
PLATELET, FLUORESCENCE: 129 K/UL (ref 130–450)
PMV BLD AUTO: 11.2 FL (ref 7–12)
POTASSIUM SERPL-SCNC: 4.8 MMOL/L (ref 3.5–5)
PROT SERPL-MCNC: 7 G/DL (ref 6.4–8.3)
RBC # BLD AUTO: 5.81 M/UL (ref 3.8–5.8)
SODIUM SERPL-SCNC: 142 MMOL/L (ref 132–146)
TME LAST DOSE: NORMAL H
TRIGL SERPL-MCNC: 74 MG/DL
VALPROATE SERPL-MCNC: 53 UG/ML (ref 50–100)
VANCOMYCIN DOSE: NORMAL MG
VLDLC SERPL CALC-MCNC: 15 MG/DL
WBC OTHER # BLD: 6.6 K/UL (ref 4.5–11.5)

## 2024-03-26 PROCEDURE — 36415 COLL VENOUS BLD VENIPUNCTURE: CPT

## 2024-03-26 PROCEDURE — 80053 COMPREHEN METABOLIC PANEL: CPT

## 2024-03-26 PROCEDURE — 80061 LIPID PANEL: CPT

## 2024-03-26 PROCEDURE — 85025 COMPLETE CBC W/AUTO DIFF WBC: CPT

## 2024-03-26 PROCEDURE — 80164 ASSAY DIPROPYLACETIC ACD TOT: CPT

## 2024-03-26 PROCEDURE — 83036 HEMOGLOBIN GLYCOSYLATED A1C: CPT

## 2024-11-13 NOTE — PLAN OF CARE
Attempted x1 to call pt daughter. Left message.    Problem: Altered Mood, Depressive Behavior:  Goal: Able to verbalize acceptance of life and situations over which he or she has no control  Description  Able to verbalize acceptance of life and situations over which he or she has no control  Outcome: Ongoing  Goal: Able to verbalize and/or display a decrease in depressive symptoms  Description  Able to verbalize and/or display a decrease in depressive symptoms  Outcome: Ongoing     Problem: Altered Mood, Psychotic Behavior:  Goal: Able to verbalize decrease in frequency and intensity of hallucinations  Description  Able to verbalize decrease in frequency and intensity of hallucinations  5/29/2019 1722 by Milli Silva RN  Outcome: Ongoing  5/29/2019 1349 by Ricardo Jain RN  Outcome: Ongoing  Goal: Able to verbalize reality based thinking  Description  Able to verbalize reality based thinking  5/29/2019 1722 by Milli Silva RN  Outcome: Ongoing  5/29/2019 1349 by Ricardo Jain RN  Outcome: Ongoing    Pt denies suicidal ideations, homicidal ideations and hallucinations. Pt out on the unit for dinner. Pt avoids eye contact. Lethargic. Evasive. In control thus far. Will continue to monitor.

## 2025-03-26 ENCOUNTER — HOSPITAL ENCOUNTER (OUTPATIENT)
Age: 45
Discharge: HOME | End: 2025-03-26
Payer: MEDICARE

## 2025-03-26 LAB
ALBUMIN SERPL-MCNC: 4.1 G/DL (ref 3.5–5.2)
ALP SERPL-CCNC: 65 U/L (ref 40–129)
ALT SERPL-CCNC: 11 U/L (ref 0–40)
ANION GAP SERPL CALCULATED.3IONS-SCNC: 12 MMOL/L (ref 7–16)
AST SERPL-CCNC: 16 U/L (ref 0–39)
BASOPHILS # BLD: 0.01 K/UL (ref 0–0.2)
BASOPHILS NFR BLD: 0 % (ref 0–2)
BILIRUB SERPL-MCNC: 0.3 MG/DL (ref 0–1.2)
BUN SERPL-MCNC: 6 MG/DL (ref 6–20)
CALCIUM SERPL-MCNC: 9.4 MG/DL (ref 8.6–10.2)
CHLORIDE SERPL-SCNC: 99 MMOL/L (ref 98–107)
CHOLEST SERPL-MCNC: 174 MG/DL
CO2 SERPL-SCNC: 27 MMOL/L (ref 22–29)
CREAT SERPL-MCNC: 1.1 MG/DL (ref 0.7–1.2)
DATE LAST DOSE: ABNORMAL
EOSINOPHIL # BLD: 0.02 K/UL (ref 0.05–0.5)
EOSINOPHILS RELATIVE PERCENT: 0 % (ref 0–6)
ERYTHROCYTE [DISTWIDTH] IN BLOOD BY AUTOMATED COUNT: 14.2 % (ref 11.5–15)
GFR, ESTIMATED: 88 ML/MIN/1.73M2
GLUCOSE SERPL-MCNC: 86 MG/DL (ref 74–99)
HBA1C MFR BLD: 8.9 % (ref 4–5.6)
HCT VFR BLD AUTO: 49.2 % (ref 37–54)
HDLC SERPL-MCNC: 43 MG/DL
HGB BLD-MCNC: 15.4 G/DL (ref 12.5–16.5)
IMM GRANULOCYTES # BLD AUTO: 0.04 K/UL (ref 0–0.58)
IMM GRANULOCYTES NFR BLD: 1 % (ref 0–5)
LDLC SERPL CALC-MCNC: 108 MG/DL
LYMPHOCYTES NFR BLD: 2.11 K/UL (ref 1.5–4)
LYMPHOCYTES RELATIVE PERCENT: 39 % (ref 20–42)
MCH RBC QN AUTO: 28.7 PG (ref 26–35)
MCHC RBC AUTO-ENTMCNC: 31.3 G/DL (ref 32–34.5)
MCV RBC AUTO: 91.6 FL (ref 80–99.9)
MONOCYTES NFR BLD: 0.53 K/UL (ref 0.1–0.95)
MONOCYTES NFR BLD: 10 % (ref 2–12)
NEUTROPHILS NFR BLD: 50 % (ref 43–80)
NEUTS SEG NFR BLD: 2.7 K/UL (ref 1.8–7.3)
PLATELET, FLUORESCENCE: 131 K/UL (ref 130–450)
PMV BLD AUTO: 10.5 FL (ref 7–12)
POTASSIUM SERPL-SCNC: 4.4 MMOL/L (ref 3.5–5)
PROT SERPL-MCNC: 7 G/DL (ref 6.4–8.3)
RBC # BLD AUTO: 5.37 M/UL (ref 3.8–5.8)
SODIUM SERPL-SCNC: 138 MMOL/L (ref 132–146)
TME LAST DOSE: ABNORMAL H
TRIGL SERPL-MCNC: 114 MG/DL
TSH SERPL DL<=0.05 MIU/L-ACNC: 0.76 UIU/ML (ref 0.27–4.2)
VALPROATE SERPL-MCNC: <3 UG/ML (ref 50–100)
VANCOMYCIN DOSE: ABNORMAL MG
VLDLC SERPL CALC-MCNC: 23 MG/DL
WBC OTHER # BLD: 5.4 K/UL (ref 4.5–11.5)

## 2025-03-26 PROCEDURE — 80053 COMPREHEN METABOLIC PANEL: CPT

## 2025-03-26 PROCEDURE — 80061 LIPID PANEL: CPT

## 2025-03-26 PROCEDURE — 84443 ASSAY THYROID STIM HORMONE: CPT

## 2025-03-26 PROCEDURE — 36415 COLL VENOUS BLD VENIPUNCTURE: CPT

## 2025-03-26 PROCEDURE — 85025 COMPLETE CBC W/AUTO DIFF WBC: CPT

## 2025-03-26 PROCEDURE — 80164 ASSAY DIPROPYLACETIC ACD TOT: CPT

## 2025-03-26 PROCEDURE — 83036 HEMOGLOBIN GLYCOSYLATED A1C: CPT
